# Patient Record
Sex: FEMALE | Race: WHITE | NOT HISPANIC OR LATINO
[De-identification: names, ages, dates, MRNs, and addresses within clinical notes are randomized per-mention and may not be internally consistent; named-entity substitution may affect disease eponyms.]

---

## 2017-12-30 ENCOUNTER — TRANSCRIPTION ENCOUNTER (OUTPATIENT)
Age: 32
End: 2017-12-30

## 2019-01-28 ENCOUNTER — TRANSCRIPTION ENCOUNTER (OUTPATIENT)
Age: 34
End: 2019-01-28

## 2019-01-31 ENCOUNTER — APPOINTMENT (OUTPATIENT)
Dept: GASTROENTEROLOGY | Facility: CLINIC | Age: 34
End: 2019-01-31
Payer: MEDICAID

## 2019-01-31 VITALS
HEART RATE: 82 BPM | WEIGHT: 200 LBS | BODY MASS INDEX: 37.76 KG/M2 | SYSTOLIC BLOOD PRESSURE: 110 MMHG | HEIGHT: 61 IN | DIASTOLIC BLOOD PRESSURE: 80 MMHG

## 2019-01-31 DIAGNOSIS — Z78.9 OTHER SPECIFIED HEALTH STATUS: ICD-10-CM

## 2019-01-31 DIAGNOSIS — R11.2 NAUSEA WITH VOMITING, UNSPECIFIED: ICD-10-CM

## 2019-01-31 PROCEDURE — 99204 OFFICE O/P NEW MOD 45 MIN: CPT

## 2019-01-31 RX ORDER — BUPROPION HYDROCHLORIDE 300 MG/1
300 TABLET, EXTENDED RELEASE ORAL
Qty: 30 | Refills: 0 | Status: ACTIVE | COMMUNITY
Start: 2018-07-30

## 2019-01-31 RX ORDER — AMITRIPTYLINE HYDROCHLORIDE 25 MG/1
25 TABLET, FILM COATED ORAL
Qty: 30 | Refills: 0 | Status: DISCONTINUED | COMMUNITY
Start: 2019-01-10

## 2019-01-31 RX ORDER — DULOXETINE HYDROCHLORIDE 60 MG/1
60 CAPSULE, DELAYED RELEASE PELLETS ORAL
Qty: 30 | Refills: 0 | Status: ACTIVE | COMMUNITY
Start: 2018-09-06

## 2019-01-31 RX ORDER — ONDANSETRON 4 MG/1
4 TABLET, ORALLY DISINTEGRATING ORAL
Qty: 30 | Refills: 0 | Status: ACTIVE | COMMUNITY
Start: 2019-01-31 | End: 1900-01-01

## 2019-01-31 RX ORDER — LURASIDONE HYDROCHLORIDE 60 MG/1
60 TABLET, FILM COATED ORAL
Qty: 30 | Refills: 0 | Status: ACTIVE | COMMUNITY
Start: 2018-12-13

## 2019-01-31 NOTE — ASSESSMENT
[FreeTextEntry1] : Gastroenteritis: Likely infectious given short time course.  Advised to stop Imodium until we have ruled out an infection.  Will send stool for C. diff, GI PCR.  Also will send CBC and CMP.  I will call patient with results.  Starting omeprazole and ondansetron for acute illness.

## 2019-01-31 NOTE — REVIEW OF SYSTEMS
[Heart Rate Is Fast] : fast heart rate [Chest Pain] : chest pain [As Noted in HPI] : as noted in HPI [Negative] : Heme/Lymph

## 2019-01-31 NOTE — PHYSICAL EXAM
[General Appearance - Alert] : alert [General Appearance - In No Acute Distress] : in no acute distress [Sclera] : the sclera and conjunctiva were normal [PERRL With Normal Accommodation] : pupils were equal in size, round, and reactive to light [Extraocular Movements] : extraocular movements were intact [Outer Ear] : the ears and nose were normal in appearance [Oropharynx] : the oropharynx was normal [Neck Appearance] : the appearance of the neck was normal [Neck Cervical Mass (___cm)] : no neck mass was observed [Jugular Venous Distention Increased] : there was no jugular-venous distention [Thyroid Diffuse Enlargement] : the thyroid was not enlarged [Thyroid Nodule] : there were no palpable thyroid nodules [] : no respiratory distress [Auscultation Breath Sounds / Voice Sounds] : lungs were clear to auscultation bilaterally [Heart Sounds] : normal S1 and S2 [Edema] : there was no peripheral edema [Bowel Sounds] : normal bowel sounds [Abdomen Soft] : soft [Cervical Lymph Nodes Enlarged Posterior Bilaterally] : posterior cervical [Cervical Lymph Nodes Enlarged Anterior Bilaterally] : anterior cervical [Supraclavicular Lymph Nodes Enlarged Bilaterally] : supraclavicular [Nail Clubbing] : no clubbing  or cyanosis of the fingernails [Skin Color & Pigmentation] : normal skin color and pigmentation [Skin Turgor] : normal skin turgor [No Focal Deficits] : no focal deficits [FreeTextEntry1] : diffusely tender to light palpation

## 2019-01-31 NOTE — HISTORY OF PRESENT ILLNESS
[de-identified] : This is a 33-year-old woman with a past medial history of nephrolithiasis, anxiety, depression and C. diff who presents for evaluation for a two-week history of diarrhea, nausea, and vomiting.  The patient reports about 4 episodes of diarrhea daily, described as watery.  She also reports nausea and vomiting and an inability to keep food down.  She went to [facility] urgent care and her PCP for evaluation.  She reports chest pain, hot flashes and "painful gas".  She denies chills or fevers but reports temps of 99.9.  She was advised to take Imodium, which has stopped her diarrhea.

## 2019-02-01 ENCOUNTER — OUTPATIENT (OUTPATIENT)
Dept: OUTPATIENT SERVICES | Facility: HOSPITAL | Age: 34
LOS: 1 days | End: 2019-02-01
Payer: MEDICAID

## 2019-02-01 DIAGNOSIS — Z90.49 ACQUIRED ABSENCE OF OTHER SPECIFIED PARTS OF DIGESTIVE TRACT: Chronic | ICD-10-CM

## 2019-02-01 DIAGNOSIS — Z90.89 ACQUIRED ABSENCE OF OTHER ORGANS: Chronic | ICD-10-CM

## 2019-02-01 LAB
ALBUMIN SERPL ELPH-MCNC: 4.4 G/DL
ALP BLD-CCNC: 72 U/L
ALT SERPL-CCNC: 22 U/L
ANION GAP SERPL CALC-SCNC: 14 MMOL/L
AST SERPL-CCNC: 15 U/L
BASOPHILS # BLD AUTO: 0.03 K/UL
BASOPHILS NFR BLD AUTO: 0.3 %
BILIRUB SERPL-MCNC: 0.3 MG/DL
BUN SERPL-MCNC: 9 MG/DL
CALCIUM SERPL-MCNC: 9.8 MG/DL
CHLORIDE SERPL-SCNC: 102 MMOL/L
CO2 SERPL-SCNC: 26 MMOL/L
CREAT SERPL-MCNC: 0.84 MG/DL
EOSINOPHIL # BLD AUTO: 0.22 K/UL
EOSINOPHIL NFR BLD AUTO: 2.2 %
GLUCOSE SERPL-MCNC: 207 MG/DL
HCT VFR BLD CALC: 43.2 %
HGB BLD-MCNC: 14.4 G/DL
IMM GRANULOCYTES NFR BLD AUTO: 0.2 %
LYMPHOCYTES # BLD AUTO: 3.68 K/UL
LYMPHOCYTES NFR BLD AUTO: 37 %
MAN DIFF?: NORMAL
MCHC RBC-ENTMCNC: 30.5 PG
MCHC RBC-ENTMCNC: 33.3 GM/DL
MCV RBC AUTO: 91.5 FL
MONOCYTES # BLD AUTO: 0.55 K/UL
MONOCYTES NFR BLD AUTO: 5.5 %
NEUTROPHILS # BLD AUTO: 5.45 K/UL
NEUTROPHILS NFR BLD AUTO: 54.8 %
PLATELET # BLD AUTO: 387 K/UL
POTASSIUM SERPL-SCNC: 4 MMOL/L
PROT SERPL-MCNC: 6.7 G/DL
RBC # BLD: 4.72 M/UL
RBC # FLD: 13.3 %
SODIUM SERPL-SCNC: 142 MMOL/L
WBC # FLD AUTO: 9.95 K/UL

## 2019-02-01 PROCEDURE — G9001: CPT

## 2019-02-03 RX ORDER — VANCOMYCIN HYDROCHLORIDE 125 MG/1
125 CAPSULE ORAL 4 TIMES DAILY
Qty: 56 | Refills: 0 | Status: DISCONTINUED | COMMUNITY
Start: 2019-02-02 | End: 2019-02-03

## 2019-02-04 ENCOUNTER — RX CHANGE (OUTPATIENT)
Age: 34
End: 2019-02-04

## 2019-02-04 ENCOUNTER — RX RENEWAL (OUTPATIENT)
Age: 34
End: 2019-02-04

## 2019-02-04 ENCOUNTER — MED ADMIN CHARGE (OUTPATIENT)
Age: 34
End: 2019-02-04

## 2019-02-04 RX ORDER — VANCOMYCIN HYDROCHLORIDE 125 MG/1
125 CAPSULE ORAL 4 TIMES DAILY
Qty: 56 | Refills: 0 | Status: ACTIVE | COMMUNITY
Start: 2019-02-03 | End: 1900-01-01

## 2019-02-04 RX ORDER — VANCOMYCIN HYDROCHLORIDE 25 MG/ML
25 KIT ORAL
Qty: 1 | Refills: 0 | Status: ACTIVE | COMMUNITY
Start: 2019-02-04 | End: 1900-01-01

## 2019-02-07 ENCOUNTER — EMERGENCY (EMERGENCY)
Facility: HOSPITAL | Age: 34
LOS: 1 days | Discharge: HOME | End: 2019-02-07
Attending: EMERGENCY MEDICINE
Payer: MEDICAID

## 2019-02-07 VITALS — WEIGHT: 199.96 LBS | HEIGHT: 61 IN

## 2019-02-07 DIAGNOSIS — Z90.89 ACQUIRED ABSENCE OF OTHER ORGANS: Chronic | ICD-10-CM

## 2019-02-07 DIAGNOSIS — Z90.49 ACQUIRED ABSENCE OF OTHER SPECIFIED PARTS OF DIGESTIVE TRACT: Chronic | ICD-10-CM

## 2019-02-07 LAB
ALBUMIN SERPL ELPH-MCNC: 4.4 G/DL — SIGNIFICANT CHANGE UP (ref 3.3–5.2)
ALP SERPL-CCNC: 72 U/L — SIGNIFICANT CHANGE UP (ref 40–120)
ALT FLD-CCNC: 68 U/L — HIGH
ANION GAP SERPL CALC-SCNC: 11 MMOL/L — SIGNIFICANT CHANGE UP (ref 5–17)
AST SERPL-CCNC: 32 U/L — HIGH
BASOPHILS # BLD AUTO: 0 K/UL — SIGNIFICANT CHANGE UP (ref 0–0.2)
BASOPHILS NFR BLD AUTO: 0.2 % — SIGNIFICANT CHANGE UP (ref 0–2)
BILIRUB SERPL-MCNC: 0.3 MG/DL — LOW (ref 0.4–2)
BUN SERPL-MCNC: 9 MG/DL — SIGNIFICANT CHANGE UP (ref 8–20)
CALCIUM SERPL-MCNC: 9.3 MG/DL — SIGNIFICANT CHANGE UP (ref 8.6–10.2)
CHLORIDE SERPL-SCNC: 103 MMOL/L — SIGNIFICANT CHANGE UP (ref 98–107)
CO2 SERPL-SCNC: 28 MMOL/L — SIGNIFICANT CHANGE UP (ref 22–29)
CREAT SERPL-MCNC: 0.68 MG/DL — SIGNIFICANT CHANGE UP (ref 0.5–1.3)
EOSINOPHIL # BLD AUTO: 0.2 K/UL — SIGNIFICANT CHANGE UP (ref 0–0.5)
EOSINOPHIL NFR BLD AUTO: 2.2 % — SIGNIFICANT CHANGE UP (ref 0–6)
GLUCOSE SERPL-MCNC: 112 MG/DL — SIGNIFICANT CHANGE UP (ref 70–115)
HCG SERPL-ACNC: <4 MIU/ML — SIGNIFICANT CHANGE UP
HCT VFR BLD CALC: 39.1 % — SIGNIFICANT CHANGE UP (ref 37–47)
HGB BLD-MCNC: 13.6 G/DL — SIGNIFICANT CHANGE UP (ref 12–16)
LACTATE BLDV-MCNC: 1.7 MMOL/L — SIGNIFICANT CHANGE UP (ref 0.5–2)
LIDOCAIN IGE QN: 21 U/L — LOW (ref 22–51)
LYMPHOCYTES # BLD AUTO: 2.8 K/UL — SIGNIFICANT CHANGE UP (ref 1–4.8)
LYMPHOCYTES # BLD AUTO: 29.5 % — SIGNIFICANT CHANGE UP (ref 20–55)
MCHC RBC-ENTMCNC: 30.8 PG — SIGNIFICANT CHANGE UP (ref 27–31)
MCHC RBC-ENTMCNC: 34.8 G/DL — SIGNIFICANT CHANGE UP (ref 32–36)
MCV RBC AUTO: 88.7 FL — SIGNIFICANT CHANGE UP (ref 81–99)
MONOCYTES # BLD AUTO: 0.5 K/UL — SIGNIFICANT CHANGE UP (ref 0–0.8)
MONOCYTES NFR BLD AUTO: 5 % — SIGNIFICANT CHANGE UP (ref 3–10)
NEUTROPHILS # BLD AUTO: 6 K/UL — SIGNIFICANT CHANGE UP (ref 1.8–8)
NEUTROPHILS NFR BLD AUTO: 62.8 % — SIGNIFICANT CHANGE UP (ref 37–73)
PLATELET # BLD AUTO: 317 K/UL — SIGNIFICANT CHANGE UP (ref 150–400)
POTASSIUM SERPL-MCNC: 4.8 MMOL/L — SIGNIFICANT CHANGE UP (ref 3.5–5.3)
POTASSIUM SERPL-SCNC: 4.8 MMOL/L — SIGNIFICANT CHANGE UP (ref 3.5–5.3)
PROT SERPL-MCNC: 7.2 G/DL — SIGNIFICANT CHANGE UP (ref 6.6–8.7)
RBC # BLD: 4.41 M/UL — SIGNIFICANT CHANGE UP (ref 4.4–5.2)
RBC # FLD: 12.7 % — SIGNIFICANT CHANGE UP (ref 11–15.6)
SODIUM SERPL-SCNC: 142 MMOL/L — SIGNIFICANT CHANGE UP (ref 135–145)
WBC # BLD: 9.5 K/UL — SIGNIFICANT CHANGE UP (ref 4.8–10.8)
WBC # FLD AUTO: 9.5 K/UL — SIGNIFICANT CHANGE UP (ref 4.8–10.8)

## 2019-02-07 PROCEDURE — 85027 COMPLETE CBC AUTOMATED: CPT

## 2019-02-07 PROCEDURE — 74177 CT ABD & PELVIS W/CONTRAST: CPT

## 2019-02-07 PROCEDURE — 96374 THER/PROPH/DIAG INJ IV PUSH: CPT | Mod: XU

## 2019-02-07 PROCEDURE — 99285 EMERGENCY DEPT VISIT HI MDM: CPT

## 2019-02-07 PROCEDURE — 99284 EMERGENCY DEPT VISIT MOD MDM: CPT | Mod: 25

## 2019-02-07 PROCEDURE — 96361 HYDRATE IV INFUSION ADD-ON: CPT

## 2019-02-07 PROCEDURE — 83690 ASSAY OF LIPASE: CPT

## 2019-02-07 PROCEDURE — 74177 CT ABD & PELVIS W/CONTRAST: CPT | Mod: 26

## 2019-02-07 PROCEDURE — 96376 TX/PRO/DX INJ SAME DRUG ADON: CPT

## 2019-02-07 PROCEDURE — 36415 COLL VENOUS BLD VENIPUNCTURE: CPT

## 2019-02-07 PROCEDURE — 80053 COMPREHEN METABOLIC PANEL: CPT

## 2019-02-07 PROCEDURE — 96375 TX/PRO/DX INJ NEW DRUG ADDON: CPT

## 2019-02-07 PROCEDURE — 84702 CHORIONIC GONADOTROPIN TEST: CPT

## 2019-02-07 PROCEDURE — 83605 ASSAY OF LACTIC ACID: CPT

## 2019-02-07 RX ORDER — ACETAMINOPHEN 500 MG
2 TABLET ORAL
Qty: 120 | Refills: 0
Start: 2019-02-07 | End: 2019-02-20

## 2019-02-07 RX ORDER — ACETAMINOPHEN 500 MG
975 TABLET ORAL ONCE
Qty: 0 | Refills: 0 | Status: COMPLETED | OUTPATIENT
Start: 2019-02-07 | End: 2019-02-07

## 2019-02-07 RX ORDER — MORPHINE SULFATE 50 MG/1
4 CAPSULE, EXTENDED RELEASE ORAL ONCE
Qty: 0 | Refills: 0 | Status: DISCONTINUED | OUTPATIENT
Start: 2019-02-07 | End: 2019-02-07

## 2019-02-07 RX ORDER — IBUPROFEN 200 MG
1 TABLET ORAL
Qty: 60 | Refills: 0
Start: 2019-02-07 | End: 2019-02-20

## 2019-02-07 RX ORDER — KETOROLAC TROMETHAMINE 30 MG/ML
30 SYRINGE (ML) INJECTION ONCE
Qty: 0 | Refills: 0 | Status: DISCONTINUED | OUTPATIENT
Start: 2019-02-07 | End: 2019-02-07

## 2019-02-07 RX ORDER — LACTULOSE 10 G/15ML
20 SOLUTION ORAL ONCE
Qty: 0 | Refills: 0 | Status: COMPLETED | OUTPATIENT
Start: 2019-02-07 | End: 2019-02-07

## 2019-02-07 RX ORDER — PANTOPRAZOLE SODIUM 20 MG/1
40 TABLET, DELAYED RELEASE ORAL ONCE
Qty: 0 | Refills: 0 | Status: COMPLETED | OUTPATIENT
Start: 2019-02-07 | End: 2019-02-07

## 2019-02-07 RX ORDER — SODIUM CHLORIDE 9 MG/ML
1000 INJECTION INTRAMUSCULAR; INTRAVENOUS; SUBCUTANEOUS ONCE
Qty: 0 | Refills: 0 | Status: COMPLETED | OUTPATIENT
Start: 2019-02-07 | End: 2019-02-07

## 2019-02-07 RX ORDER — OXYCODONE AND ACETAMINOPHEN 5; 325 MG/1; MG/1
1 TABLET ORAL ONCE
Qty: 0 | Refills: 0 | Status: DISCONTINUED | OUTPATIENT
Start: 2019-02-07 | End: 2019-02-07

## 2019-02-07 RX ORDER — SIMETHICONE 80 MG/1
1 TABLET, CHEWABLE ORAL
Qty: 42 | Refills: 0 | OUTPATIENT
Start: 2019-02-07 | End: 2019-02-13

## 2019-02-07 RX ORDER — ONDANSETRON 8 MG/1
4 TABLET, FILM COATED ORAL ONCE
Qty: 0 | Refills: 0 | Status: COMPLETED | OUTPATIENT
Start: 2019-02-07 | End: 2019-02-07

## 2019-02-07 RX ADMIN — MORPHINE SULFATE 4 MILLIGRAM(S): 50 CAPSULE, EXTENDED RELEASE ORAL at 17:05

## 2019-02-07 RX ADMIN — Medication 20 MILLIGRAM(S): at 22:29

## 2019-02-07 RX ADMIN — LACTULOSE 20 GRAM(S): 10 SOLUTION ORAL at 22:28

## 2019-02-07 RX ADMIN — Medication 30 MILLIGRAM(S): at 17:04

## 2019-02-07 RX ADMIN — ONDANSETRON 4 MILLIGRAM(S): 8 TABLET, FILM COATED ORAL at 14:07

## 2019-02-07 RX ADMIN — SODIUM CHLORIDE 1000 MILLILITER(S): 9 INJECTION INTRAMUSCULAR; INTRAVENOUS; SUBCUTANEOUS at 14:06

## 2019-02-07 RX ADMIN — PANTOPRAZOLE SODIUM 40 MILLIGRAM(S): 20 TABLET, DELAYED RELEASE ORAL at 12:26

## 2019-02-07 RX ADMIN — SODIUM CHLORIDE 2000 MILLILITER(S): 9 INJECTION INTRAMUSCULAR; INTRAVENOUS; SUBCUTANEOUS at 12:27

## 2019-02-07 RX ADMIN — Medication 30 MILLIGRAM(S): at 14:07

## 2019-02-07 RX ADMIN — OXYCODONE AND ACETAMINOPHEN 1 TABLET(S): 5; 325 TABLET ORAL at 14:08

## 2019-02-07 RX ADMIN — MORPHINE SULFATE 4 MILLIGRAM(S): 50 CAPSULE, EXTENDED RELEASE ORAL at 17:15

## 2019-02-07 RX ADMIN — Medication 975 MILLIGRAM(S): at 22:28

## 2019-02-07 RX ADMIN — Medication 1 MILLIGRAM(S): at 22:29

## 2019-02-07 RX ADMIN — Medication 30 MILLIGRAM(S): at 17:50

## 2019-02-07 RX ADMIN — SODIUM CHLORIDE 2000 MILLILITER(S): 9 INJECTION INTRAMUSCULAR; INTRAVENOUS; SUBCUTANEOUS at 14:10

## 2019-02-07 RX ADMIN — Medication 1 ENEMA: at 18:29

## 2019-02-07 RX ADMIN — Medication 30 MILLIGRAM(S): at 12:27

## 2019-02-07 NOTE — ED PROVIDER NOTE - OBJECTIVE STATEMENT
33F w/recent dx of C diff presents for 2d of constipation and abdominal pain. No significant surgical hx. Denies fevevers, chills or vomiting though feels nauseated. Denies balck or bloody stool. Started on vanco yesterday. Rcently seen at [facility] where she was given a lot of morphine and oxycodone for the pain which she feels may have worsened her constipation. Percocet has not been helping pain today. No other exacerbating or relieving factors.

## 2019-02-07 NOTE — ED PROVIDER NOTE - NSFOLLOWUPINSTRUCTIONS_ED_ALL_ED_FT
You were seen and evaluated in the emergency department for abdominal pain. You had a thorough evaluation including an exam, labs and imaging. You were given medications for comfort. Your workup did not demonstrate any concerning findings beyond constipation. There was no sign of severe complication of your C diff. This does not mean that your pain is not real, only that we were unable to find a dangerous or life-threatening cause. Please read the attached information sheets as they will provide useful information regarding your condition.    Continue taking your antibiotics for your infection. Try to avoid opioid pain medication as this will worsen your constipation. You may take up to 400mg of ibuprofen (Motrin, Advil) every 6 hours as needed for pain. Please take ibuprofen with food if possible to prevent stomach upset. You may also take up to 1000mg of acetaminophen (Tylenol) every 6 hours as needed for pain. It is ok to mix these medications with each other. Do not mix them with other pain medications such as naproxen (Alieve) or asprin unless specifically instructed by your doctor. Many prescription pain medications and cough medications contain acetaminophen. If you take these medications, please discuss with your provider or primary care doctor if you need to adjust the dose of acetaminophen you can take. Take other pain medications as prescribed. Take 1 cap of miralax daily while constipated. If this does not help you may take a second, though you should not need this for several consecutive days.    It is important to understand that while your workup was reassuring, no workup can completely exclude all concerning conditions. Therfore, please return if you develop worsening or concerning new symptoms including worsening pain, pain that spreads to new places, inability to tolerate an oral diet, new and worsening fevers and chills, weakness, inability to pass stool or flatulent gas, those included on the attached information sheets, or other findings concerning to you. Please take all your medications as prescribed.    Please be sure to follow up with your regular doctor in the next 2 days.

## 2019-02-07 NOTE — ED STATDOCS - PROGRESS NOTE DETAILS
Patient w/ PMH C diff (10 years ago) presents complaining of constipation. She initially started having diarrhea last week and was diagnosed with C diff 6 days ago, but did not start treatment until 3 days ago. She also had chest pains which caused her to present to [facility] ED. She was admitted to [facility] where she was admitted with chest pain where she received a lot of morphine. She was discharged the next morning on the same day that the diarrhea stopped. The last time she moved her bowels was 4 days ago. She complains of severe bloating, cramping and abdominal distension. Dr. Medina (GI) recommended she come to the ED for evaluation. Other than PO Vanco she is taking Pervacid and Pepcid.  PMD: Allexander  Exam: Appears uncomfortable, tachycardic, lungs CTA B/L, abd distended, decreased bowel sounds, diffusely TTP, mostly in epigastrium, right CVAT.  Initial orders placed. Patient will be sent to the main for further evaluation by another physician.

## 2019-02-07 NOTE — ED ADULT NURSE NOTE - OBJECTIVE STATEMENT
Pt is here with c/o constipation and abd pain.  States she was in the hospital on Monday and dx with C diff, states she had morphine for pain and has been constipated.  Medicated for pain as ordered

## 2019-02-07 NOTE — ED PROVIDER NOTE - MEDICAL DECISION MAKING DETAILS
Abdominal pain in setting of C diff. Pt tachycaridc appears dehydrated, nontoxic. Constipation, r/o megacolon. CT already ordered by [facility] lora prior to my assessment. Will give pain control, enema, fluids, reassess.

## 2019-02-07 NOTE — ED PROVIDER NOTE - CARE PLAN
Principal Discharge DX:	Abdominal pain  Secondary Diagnosis:	Constipation  Secondary Diagnosis:	C. difficile colitis

## 2019-02-07 NOTE — ED ADULT NURSE REASSESSMENT NOTE - NS ED NURSE REASSESS COMMENT FT1
Pt medicated as ordered per Dr Masters.  P/s she feels much better since passing gas.  Awaiting disposition

## 2019-02-07 NOTE — ED PROVIDER NOTE - PMH
Anxiety    Borderline personality disorder    Depression    IBS (irritable bowel syndrome)    Recurrent UTI    Renal colic

## 2019-02-07 NOTE — ED ADULT TRIAGE NOTE - CHIEF COMPLAINT QUOTE
Pt ambulatory in ED c/o " I have cdiff." Pt reports her diarrhea stopped 2 days ago and now has constipation. Pt currently on PO vanco. Also reports abdominal discomfort secondary to gas.

## 2019-02-07 NOTE — ED PROVIDER NOTE - PHYSICAL EXAMINATION
GEN: mild distress from pain, NAD, A & O x 4  HEAD/EYES: NCAT, PERRL, EOMI, anicteric sclerae, no conjunctival pallor  ENT: mucus membranes moist, oropharynx WNL, trachea midline, no JVD  RESP: lungs CTA with equal breath sounds bilaterally, chest wall nontender and atraumatic  CV: heart with normal rate (no longer tachycardic), reg rhythm S1, S2, no murmur; distal pulses intact and symmetric bilaterally  GI: mild diffuse abdomianl tenderness, no rebound, no guarding, nonsurgical  : no CVAT  MSK: extremities atraumatic and nontender, no edema, no asymmetry. the back is without midline or lateral tenderness, there is no spinal deformity or stepoff and the back is ranged painlessly. the neck has no midline tenderness, deformity, or stepoff, and is ranged painlessly.  SKIN: warm, dry, no rash, no bruising, no cyanosis. color appropriate for ethnicity  NEURO: alert, mentating appropriately, no facial asymmetry. gross sensation, motor, coordination are intact  PSYCH: Affect appropriate

## 2019-02-07 NOTE — ED PROVIDER NOTE - NS ED ROS FT
CONST: no fevers, no chills, no trauma  EYES: no pain, no visual disturbances  ENT: no sore throat, no epistaxis, no rhinorrhea, no hearing changes  CV: no chest pain, no palpitations, no orthopnea, no extremity pain or swelling  RESP: no shortness of breath, no cough, no sputum, no pleurisy, no wheezing  ABD: see HPI  : no dysuria, no hematuria, no frequency, no urgency  MSK: no back pain, no neck pain, no extremity pain  NEURO: no headache, no sensory disturbances, no focal weakness, no dizziness  HEME: no easy bleeding or bruising  SKIN: no diaphoresis, no rash

## 2019-02-07 NOTE — ED PROVIDER NOTE - PROGRESS NOTE DETAILS
Pt repeatedly refusing oral contrast Pt refusing CT until after enema. Pt appears more comfortable. CT neagtive except for constipation, having bowel movement now. Will DC home with laxatives, pain control, f/u with PCP. Initially improved now with worse pain, advised family opioids making it worse. Will try to avioid opioids and giv pain control through other means. Pain improved after medication and large flatulance. Pt stable for discharge

## 2019-02-08 VITALS
OXYGEN SATURATION: 100 % | DIASTOLIC BLOOD PRESSURE: 67 MMHG | HEART RATE: 90 BPM | TEMPERATURE: 98 F | RESPIRATION RATE: 20 BRPM | SYSTOLIC BLOOD PRESSURE: 106 MMHG

## 2019-02-08 DIAGNOSIS — Z71.89 OTHER SPECIFIED COUNSELING: ICD-10-CM

## 2019-02-14 ENCOUNTER — APPOINTMENT (OUTPATIENT)
Dept: GASTROENTEROLOGY | Facility: CLINIC | Age: 34
End: 2019-02-14
Payer: MEDICAID

## 2019-02-14 VITALS
HEIGHT: 61 IN | HEART RATE: 84 BPM | DIASTOLIC BLOOD PRESSURE: 74 MMHG | BODY MASS INDEX: 37.76 KG/M2 | OXYGEN SATURATION: 98 % | RESPIRATION RATE: 16 BRPM | SYSTOLIC BLOOD PRESSURE: 108 MMHG | WEIGHT: 200 LBS

## 2019-02-14 DIAGNOSIS — A04.72 ENTEROCOLITIS DUE TO CLOSTRIDIUM DIFFICILE, NOT SPECIFIED AS RECURRENT: ICD-10-CM

## 2019-02-14 DIAGNOSIS — R19.7 DIARRHEA, UNSPECIFIED: ICD-10-CM

## 2019-02-14 PROCEDURE — 99214 OFFICE O/P EST MOD 30 MIN: CPT

## 2019-02-14 RX ORDER — FIDAXOMICIN 200 MG/1
200 TABLET, FILM COATED ORAL TWICE DAILY
Qty: 20 | Refills: 0 | Status: ACTIVE | COMMUNITY
Start: 2019-02-14 | End: 1900-01-01

## 2019-02-14 NOTE — PHYSICAL EXAM
[General Appearance - Alert] : alert [General Appearance - In No Acute Distress] : in no acute distress [Sclera] : the sclera and conjunctiva were normal [PERRL With Normal Accommodation] : pupils were equal in size, round, and reactive to light [Extraocular Movements] : extraocular movements were intact [Outer Ear] : the ears and nose were normal in appearance [Oropharynx] : the oropharynx was normal [Neck Appearance] : the appearance of the neck was normal [Neck Cervical Mass (___cm)] : no neck mass was observed [Jugular Venous Distention Increased] : there was no jugular-venous distention [Thyroid Diffuse Enlargement] : the thyroid was not enlarged [Thyroid Nodule] : there were no palpable thyroid nodules [] : no respiratory distress [Auscultation Breath Sounds / Voice Sounds] : lungs were clear to auscultation bilaterally [Heart Sounds] : normal S1 and S2 [Edema] : there was no peripheral edema [Bowel Sounds] : normal bowel sounds [Abdomen Soft] : soft [Cervical Lymph Nodes Enlarged Posterior Bilaterally] : posterior cervical [Cervical Lymph Nodes Enlarged Anterior Bilaterally] : anterior cervical [Supraclavicular Lymph Nodes Enlarged Bilaterally] : supraclavicular [Nail Clubbing] : no clubbing  or cyanosis of the fingernails [Skin Color & Pigmentation] : normal skin color and pigmentation [Skin Turgor] : normal skin turgor [No Focal Deficits] : no focal deficits [Oriented To Time, Place, And Person] : oriented to person, place, and time [FreeTextEntry1] : anxious

## 2019-02-14 NOTE — HISTORY OF PRESENT ILLNESS
[de-identified] : This is a 33-year-old woman with a past medial history of nephrolithiasis, anxiety, depression and C. diff who recently seen by me in approximately 2 weeks ago for acute diarrhea that was later identified being secondary to C. difficile, for which she completed a course of vancomycin by mouth in liquid form.  After taking the medication for about 7-10 days, her bowel habits normalized, and she was feeling much better.  She reports today that overnight, she was awoken by significant abdominal pain followed by diarrhea, and has had subsequent episodes today prompting her to make an urgent followup appointment with me today.  Of note, since she was last evaluated by me, I received a phone call from her, and she reported to me that her abdominal pain was worse and her abdomen was distended.  Because I was unable to see her in the office at that time, I advised that she go to the emergency department for further evaluation.  She underwent a CT abdomen and pelvis that was not concerning, and she was discharged safely from the Emergency Department.  She also reports that three of her coworkers have also become sick with a diarrheal illness, and she is concerned that she may have spread and the C. difficile to her coworkers.  She reports that she was terminated due to missed work from this illness and four did not disclosing that she may have a communicable disease.  She denies any nausea, vomiting, abdominal distention, or rectal bleeding.  She denies any fevers or chills.

## 2019-02-14 NOTE — ASSESSMENT
[FreeTextEntry1] : Likely recurrent C. diff.  Since she seems to have failed treatment with PO vancomycin, will order fidaxomicin.  Advised good hygiene, avoidance of contact with the immunocompromised and thorough handwashing.  All questions/concerns addressed.

## 2019-02-27 ENCOUNTER — MEDICATION RENEWAL (OUTPATIENT)
Age: 34
End: 2019-02-27

## 2019-02-27 RX ORDER — OMEPRAZOLE 40 MG/1
40 CAPSULE, DELAYED RELEASE ORAL
Qty: 1 | Refills: 4 | Status: ACTIVE | COMMUNITY
Start: 2019-01-31 | End: 1900-01-01

## 2019-03-11 ENCOUNTER — OTHER (OUTPATIENT)
Age: 34
End: 2019-03-11

## 2019-06-10 ENCOUNTER — APPOINTMENT (OUTPATIENT)
Dept: GASTROENTEROLOGY | Facility: CLINIC | Age: 34
End: 2019-06-10

## 2020-06-01 ENCOUNTER — OUTPATIENT (OUTPATIENT)
Dept: OUTPATIENT SERVICES | Facility: HOSPITAL | Age: 35
LOS: 1 days | End: 2020-06-01
Payer: MEDICAID

## 2020-06-01 DIAGNOSIS — Z90.89 ACQUIRED ABSENCE OF OTHER ORGANS: Chronic | ICD-10-CM

## 2020-06-01 DIAGNOSIS — Z90.49 ACQUIRED ABSENCE OF OTHER SPECIFIED PARTS OF DIGESTIVE TRACT: Chronic | ICD-10-CM

## 2020-06-15 PROCEDURE — G9001: CPT

## 2020-06-26 DIAGNOSIS — Z71.89 OTHER SPECIFIED COUNSELING: ICD-10-CM

## 2020-12-18 ENCOUNTER — TRANSCRIPTION ENCOUNTER (OUTPATIENT)
Age: 35
End: 2020-12-18

## 2021-02-08 NOTE — ED PROVIDER NOTE - NS ED NOTE AC HIGH RISK COUNTRIES
Name: Rox Hall  : 59 year old  Date:           Chief Complaint: Hypothyroid parathyroidism    History of Present Illness:  Patient comes new to this provider previously known to , and Dr. Fernandez.  Hypothyroid History   Night sweats? Denies   fatigue? Denies   Dry skin? denies   slow or rapid heart beat? denies   [place] TSH value? six months ago  Current treatment? Levothyroxine 137 mcg p.o. daily  Trouble waking? denies     Patient has a history of anxiety takes Xanax but a few times years the patient does not want to start SSRIs.    Patient is a current smoker has no desire to quit at this time;.     influenza vaccine: refused   ALLERGIES:  No Known Allergies  Active Ambulatory Problems     Diagnosis Date Noted   • Anxiety 2014   • Hypothyroidism (acquired) 2014   • Non-compliance 2014   • Eczema 02/15/2019   • Smoker 02/15/2019   • Healthcare maintenance 02/15/2019     Resolved Ambulatory Problems     Diagnosis Date Noted   • No Resolved Ambulatory Problems     Past Medical History:   Diagnosis Date   • Chest pain    • Hypothyroidism      Past Surgical History:   Procedure Laterality Date   • Breast lumpectomy       Social History     Socioeconomic History   • Marital status: /Civil Union     Spouse name: Not on file   • Number of children: Not on file   • Years of education: Not on file   • Highest education level: Not on file   Occupational History   • Not on file   Social Needs   • Financial resource strain: Not on file   • Food insecurity     Worry: Not on file     Inability: Not on file   • Transportation needs     Medical: Not on file     Non-medical: Not on file   Tobacco Use   • Smoking status: Current Every Day Smoker     Packs/day: 1.00     Years: 34.00     Pack years: 34.00   • Smokeless tobacco: Never Used   Substance and Sexual Activity   • Alcohol use: Yes     Frequency: Never   • Drug use: Yes     Types: Marijuana     Comment: occasionally   • Sexual activity:  Not on file   Lifestyle   • Physical activity     Days per week: Not on file     Minutes per session: Not on file   • Stress: Not on file   Relationships   • Social connections     Talks on phone: Not on file     Gets together: Not on file     Attends Yazidism service: Not on file     Active member of club or organization: Not on file     Attends meetings of clubs or organizations: Not on file     Relationship status: Not on file   • Intimate partner violence     Fear of current or ex partner: Not on file     Emotionally abused: Not on file     Physically abused: Not on file     Forced sexual activity: Not on file   Other Topics Concern   • Not on file   Social History Narrative   • Not on file         Review of Systems   Constitutional: Negative for activity change, appetite change, chills, fever and unexpected weight change.   HENT: Negative for congestion, drooling, ear discharge, ear pain, facial swelling, mouth sores, nosebleeds and voice change.    Eyes: Negative for discharge and visual disturbance.   Respiratory: Negative for apnea, cough, choking, shortness of breath, wheezing and stridor.    Cardiovascular: Negative for chest pain, palpitations and leg swelling.   Gastrointestinal: Negative for abdominal pain, constipation, diarrhea, nausea and rectal pain.   Endocrine: Negative for cold intolerance, heat intolerance, polydipsia and polyuria.   Genitourinary: Negative for dysuria, flank pain, genital sores, hematuria and urgency.   Musculoskeletal: Negative for arthralgias, back pain, gait problem and joint swelling.   Skin: Negative for color change, pallor and rash.   Allergic/Immunologic: Negative for food allergies.   Neurological: Negative for dizziness, seizures, syncope, facial asymmetry, speech difficulty, weakness, light-headedness and numbness.   Hematological: Negative for adenopathy.   Psychiatric/Behavioral: Negative for agitation, behavioral problems, confusion, hallucinations and suicidal  ideas. The patient is not nervous/anxious and is not hyperactive.      Physical Exam   Constitutional: She is oriented to person, place, and time. She appears well-developed and well-nourished. No distress.   HENT:   Head: Normocephalic and atraumatic.   Right Ear: External ear normal.   Left Ear: External ear normal.   Nose: Nose normal.   Mouth/Throat: Oropharynx is clear and moist. No oropharyngeal exudate.   Eyes: Pupils are equal, round, and reactive to light. Conjunctivae and EOM are normal. Right eye exhibits no discharge. Left eye exhibits no discharge. No scleral icterus.   Neck: Normal range of motion. Neck supple. No JVD present. No tracheal deviation present. No thyromegaly present.   Cardiovascular: Normal rate, regular rhythm, normal heart sounds and intact distal pulses. Exam reveals no gallop and no friction rub.   No murmur heard.  Pulmonary/Chest: Effort normal and breath sounds normal. She has no wheezes. She has no rales. She exhibits no tenderness.   Abdominal: Soft. Bowel sounds are normal. She exhibits no distension and no mass. There is no abdominal tenderness. There is no rebound and no guarding.   Genitourinary:    Vagina normal.     Musculoskeletal: Normal range of motion.         General: No tenderness, deformity or edema.   Lymphadenopathy:     She has no cervical adenopathy.   Neurological: She is alert and oriented to person, place, and time. She has normal reflexes. No cranial nerve deficit. Coordination normal.   Skin: Skin is warm and dry. No rash noted. She is not diaphoretic. No erythema. No pallor.   Psychiatric: She has a normal mood and affect. Her behavior is normal. Judgment and thought content normal.     Visit Vitals  /60 (BP Location: LUE - Left upper extremity, Patient Position: Sitting, Cuff Size: Regular)   Temp 96.8 °F (36 °C) (Skin)   Ht 5' 4\" (1.626 m)   Wt 55.8 kg (123 lb)   BMI 21.11 kg/m²     Current Medications    ALPRAZOLAM (XANAX) 0.25 MG TABLET    TAKE 1  TABLET EVERY 6 HOURS AS NEEDED FOR ANXIETY    LEVOTHYROXINE (SYNTHROID, LEVOTHROID) 137 MCG TABLET    Take 1 tablet Monday through Friday     Hypothyroidism (acquired)      Anxiety      PLAN:  -The patients hypothyroidism is well controlled today as evidence by TSH of  4.29  -The patient currently denies any signs of symptoms of increasing thyroid dysfunction including bradycardia, dry skin, lethargy, difficulty concentrating, or weight gain.   -Will continue with current treatment regimen   -Patient was taught the proper Synthroid administration or maximum absorption and effectiveness ( 1hour before all other medications and food in the morning)   -Will continue to follow up with TSH results or in 3 months        The patient was taught CBT therapies such as deep breathing, relaxation and guided imagery   The patient was told to keep a journal of anxiety triggers, noting intensity, duration, location and recovery time  The patient denies any suicidal thoughts or ideation  The patient finds their anxiety is triggered by    events   KANIKA 7 score: less than 5   Will order xanax 0.25mg PO Q6hr prn   The patient will call the clinic or EMS for any suicidal ideation or thoughts   The patient will follow up as needed  Long term care plan for mental health was discussed     Patient has no desire to quit smoking at this time encouragement given.    Patient denied influenza vaccine and Covid vaccine at this time      Refills may be given through the follow-up timeframe and for one  courtesy refill if follow-up timeframe is not met  Refills may not be authorized for management of acute illnesses regardless of follow-up timeframe  The patient verbalized understanding of the plan of care and the risks involved with treatment and agreed.   If any chest pain, shortness of breath, dizziness or syncope occur the patient was asked to call EMS or report the local ED         Sebastian Covington CNP       No

## 2021-03-08 ENCOUNTER — EMERGENCY (EMERGENCY)
Facility: HOSPITAL | Age: 36
LOS: 1 days | End: 2021-03-08
Admitting: EMERGENCY MEDICINE
Payer: MEDICAID

## 2021-03-08 DIAGNOSIS — Z90.49 ACQUIRED ABSENCE OF OTHER SPECIFIED PARTS OF DIGESTIVE TRACT: Chronic | ICD-10-CM

## 2021-03-08 DIAGNOSIS — Z90.89 ACQUIRED ABSENCE OF OTHER ORGANS: Chronic | ICD-10-CM

## 2021-03-08 PROCEDURE — 99284 EMERGENCY DEPT VISIT MOD MDM: CPT

## 2021-03-08 PROCEDURE — 76856 US EXAM PELVIC COMPLETE: CPT | Mod: 26,59

## 2021-03-08 PROCEDURE — 74177 CT ABD & PELVIS W/CONTRAST: CPT | Mod: 26

## 2021-03-08 PROCEDURE — 93010 ELECTROCARDIOGRAM REPORT: CPT

## 2021-03-08 PROCEDURE — 93975 VASCULAR STUDY: CPT | Mod: 26

## 2021-03-15 ENCOUNTER — INPATIENT (INPATIENT)
Facility: HOSPITAL | Age: 36
LOS: 1 days | Discharge: HOME | End: 2021-03-17
Payer: MEDICAID

## 2021-03-15 DIAGNOSIS — Z90.49 ACQUIRED ABSENCE OF OTHER SPECIFIED PARTS OF DIGESTIVE TRACT: Chronic | ICD-10-CM

## 2021-03-15 DIAGNOSIS — Z90.89 ACQUIRED ABSENCE OF OTHER ORGANS: Chronic | ICD-10-CM

## 2021-03-15 PROCEDURE — 76856 US EXAM PELVIC COMPLETE: CPT | Mod: 26,59

## 2021-03-15 PROCEDURE — 93010 ELECTROCARDIOGRAM REPORT: CPT

## 2021-03-15 PROCEDURE — 93975 VASCULAR STUDY: CPT | Mod: 26

## 2021-03-15 PROCEDURE — 99285 EMERGENCY DEPT VISIT HI MDM: CPT

## 2021-03-15 PROCEDURE — 74177 CT ABD & PELVIS W/CONTRAST: CPT | Mod: 26

## 2021-03-16 PROCEDURE — 71045 X-RAY EXAM CHEST 1 VIEW: CPT | Mod: 26

## 2022-05-18 ENCOUNTER — NON-APPOINTMENT (OUTPATIENT)
Age: 37
End: 2022-05-18

## 2022-09-19 ENCOUNTER — NON-APPOINTMENT (OUTPATIENT)
Age: 37
End: 2022-09-19

## 2023-05-13 ENCOUNTER — OFFICE (OUTPATIENT)
Dept: URBAN - METROPOLITAN AREA CLINIC 12 | Facility: CLINIC | Age: 38
Setting detail: OPHTHALMOLOGY
End: 2023-05-13
Payer: MEDICAID

## 2023-05-13 DIAGNOSIS — E11.9: ICD-10-CM

## 2023-05-13 DIAGNOSIS — H16.223: ICD-10-CM

## 2023-05-13 PROBLEM — H52.7 REFRACTIVE ERROR: Status: ACTIVE | Noted: 2023-05-13

## 2023-05-13 PROCEDURE — 92012 INTRM OPH EXAM EST PATIENT: CPT | Performed by: OPTOMETRIST

## 2023-05-13 ASSESSMENT — AXIALLENGTH_DERIVED
OS_AL: 23.9508
OS_AL: 23.9508
OD_AL: 23.848
OD_AL: 23.848

## 2023-05-13 ASSESSMENT — REFRACTION_CURRENTRX
OD_CYLINDER: -0.50
OS_VPRISM_DIRECTION: SV
OS_OVR_VA: 20/
OD_VPRISM_DIRECTION: SV
OS_SPHERE: -3.00
OS_AXIS: 139
OD_SPHERE: -3.00
OD_OVR_VA: 20/
OS_CYLINDER: -1.00
OD_AXIS: 036

## 2023-05-13 ASSESSMENT — SUPERFICIAL PUNCTATE KERATITIS (SPK)
OD_SPK: 1+
OS_SPK: 1+

## 2023-05-13 ASSESSMENT — REFRACTION_MANIFEST
OS_CYLINDER: -1.00
OD_AXIS: 020
OS_VA1: 20/20
OS_AXIS: 160
OS_SPHERE: -3.00
OD_CYLINDER: -0.75
OD_VA1: 20/20
OD_SPHERE: -2.75

## 2023-05-13 ASSESSMENT — KERATOMETRY
OD_K2POWER_DIOPTERS: 46.50
OS_AXISANGLE_DEGREES: 070
OS_K2POWER_DIOPTERS: 46.75
OD_K1POWER_DIOPTERS: 45.75
OS_K1POWER_DIOPTERS: 45.75
OD_AXISANGLE_DEGREES: 113

## 2023-05-13 ASSESSMENT — REFRACTION_AUTOREFRACTION
OS_AXIS: 162
OD_AXIS: 020
OS_CYLINDER: -1.00
OD_SPHERE: -2.75
OD_CYLINDER: -0.75
OS_SPHERE: -3.00

## 2023-05-13 ASSESSMENT — SPHEQUIV_DERIVED
OS_SPHEQUIV: -3.5
OS_SPHEQUIV: -3.5
OD_SPHEQUIV: -3.125
OD_SPHEQUIV: -3.125

## 2023-05-13 ASSESSMENT — TONOMETRY
OD_IOP_MMHG: 21
OS_IOP_MMHG: 21

## 2023-05-13 ASSESSMENT — CONFRONTATIONAL VISUAL FIELD TEST (CVF)
OS_FINDINGS: FULL
OD_FINDINGS: FULL

## 2023-05-13 ASSESSMENT — VISUAL ACUITY
OD_BCVA: 20/25-2
OS_BCVA: 20/20-1

## 2023-07-13 ENCOUNTER — NON-APPOINTMENT (OUTPATIENT)
Age: 38
End: 2023-07-13

## 2023-07-14 ENCOUNTER — EMERGENCY (EMERGENCY)
Facility: HOSPITAL | Age: 38
LOS: 1 days | Discharge: HOME | End: 2023-07-14
Attending: EMERGENCY MEDICINE | Admitting: EMERGENCY MEDICINE
Payer: MEDICAID

## 2023-07-14 VITALS
OXYGEN SATURATION: 99 % | SYSTOLIC BLOOD PRESSURE: 121 MMHG | WEIGHT: 169.09 LBS | HEIGHT: 65 IN | DIASTOLIC BLOOD PRESSURE: 78 MMHG | RESPIRATION RATE: 18 BRPM | HEART RATE: 94 BPM | TEMPERATURE: 98 F

## 2023-07-14 VITALS
HEART RATE: 97 BPM | DIASTOLIC BLOOD PRESSURE: 71 MMHG | SYSTOLIC BLOOD PRESSURE: 103 MMHG | RESPIRATION RATE: 18 BRPM | OXYGEN SATURATION: 97 % | TEMPERATURE: 99 F

## 2023-07-14 DIAGNOSIS — Z90.49 ACQUIRED ABSENCE OF OTHER SPECIFIED PARTS OF DIGESTIVE TRACT: Chronic | ICD-10-CM

## 2023-07-14 DIAGNOSIS — Z90.89 ACQUIRED ABSENCE OF OTHER ORGANS: Chronic | ICD-10-CM

## 2023-07-14 LAB
ALBUMIN SERPL ELPH-MCNC: 3.7 G/DL — SIGNIFICANT CHANGE UP (ref 3.3–5)
ALP SERPL-CCNC: 64 U/L — SIGNIFICANT CHANGE UP (ref 40–120)
ALT FLD-CCNC: 28 U/L — SIGNIFICANT CHANGE UP (ref 12–78)
ANION GAP SERPL CALC-SCNC: 11 MMOL/L — SIGNIFICANT CHANGE UP (ref 5–17)
APPEARANCE UR: CLEAR — SIGNIFICANT CHANGE UP
AST SERPL-CCNC: 13 U/L — LOW (ref 15–37)
BASOPHILS # BLD AUTO: 0.04 K/UL — SIGNIFICANT CHANGE UP (ref 0–0.2)
BASOPHILS NFR BLD AUTO: 0.4 % — SIGNIFICANT CHANGE UP (ref 0–2)
BILIRUB SERPL-MCNC: 0.8 MG/DL — SIGNIFICANT CHANGE UP (ref 0.2–1.2)
BILIRUB UR-MCNC: NEGATIVE — SIGNIFICANT CHANGE UP
BUN SERPL-MCNC: 19 MG/DL — SIGNIFICANT CHANGE UP (ref 7–23)
CALCIUM SERPL-MCNC: 8.4 MG/DL — LOW (ref 8.5–10.1)
CHLORIDE SERPL-SCNC: 106 MMOL/L — SIGNIFICANT CHANGE UP (ref 96–108)
CO2 SERPL-SCNC: 21 MMOL/L — LOW (ref 22–31)
COLOR SPEC: YELLOW — SIGNIFICANT CHANGE UP
CREAT SERPL-MCNC: 1.2 MG/DL — SIGNIFICANT CHANGE UP (ref 0.5–1.3)
DIFF PNL FLD: NEGATIVE — SIGNIFICANT CHANGE UP
EGFR: 59 ML/MIN/1.73M2 — LOW
EOSINOPHIL # BLD AUTO: 0.02 K/UL — SIGNIFICANT CHANGE UP (ref 0–0.5)
EOSINOPHIL NFR BLD AUTO: 0.2 % — SIGNIFICANT CHANGE UP (ref 0–6)
GLUCOSE SERPL-MCNC: 152 MG/DL — HIGH (ref 70–99)
GLUCOSE UR QL: NEGATIVE MG/DL — SIGNIFICANT CHANGE UP
HCG SERPL-ACNC: <1 MIU/ML — SIGNIFICANT CHANGE UP
HCT VFR BLD CALC: 37.5 % — SIGNIFICANT CHANGE UP (ref 34.5–45)
HGB BLD-MCNC: 13.6 G/DL — SIGNIFICANT CHANGE UP (ref 11.5–15.5)
IMM GRANULOCYTES NFR BLD AUTO: 0.6 % — SIGNIFICANT CHANGE UP (ref 0–0.9)
KETONES UR-MCNC: 15 MG/DL
LEUKOCYTE ESTERASE UR-ACNC: NEGATIVE — SIGNIFICANT CHANGE UP
LIDOCAIN IGE QN: 66 U/L — LOW (ref 73–393)
LYMPHOCYTES # BLD AUTO: 2.99 K/UL — SIGNIFICANT CHANGE UP (ref 1–3.3)
LYMPHOCYTES # BLD AUTO: 29.5 % — SIGNIFICANT CHANGE UP (ref 13–44)
MCHC RBC-ENTMCNC: 31.7 PG — SIGNIFICANT CHANGE UP (ref 27–34)
MCHC RBC-ENTMCNC: 36.3 GM/DL — HIGH (ref 32–36)
MCV RBC AUTO: 87.4 FL — SIGNIFICANT CHANGE UP (ref 80–100)
MONOCYTES # BLD AUTO: 0.5 K/UL — SIGNIFICANT CHANGE UP (ref 0–0.9)
MONOCYTES NFR BLD AUTO: 4.9 % — SIGNIFICANT CHANGE UP (ref 2–14)
NEUTROPHILS # BLD AUTO: 6.52 K/UL — SIGNIFICANT CHANGE UP (ref 1.8–7.4)
NEUTROPHILS NFR BLD AUTO: 64.4 % — SIGNIFICANT CHANGE UP (ref 43–77)
NITRITE UR-MCNC: NEGATIVE — SIGNIFICANT CHANGE UP
NRBC # BLD: 0 /100 WBCS — SIGNIFICANT CHANGE UP (ref 0–0)
PH UR: 5 — SIGNIFICANT CHANGE UP (ref 5–8)
PLATELET # BLD AUTO: 323 K/UL — SIGNIFICANT CHANGE UP (ref 150–400)
POTASSIUM SERPL-MCNC: 3.2 MMOL/L — LOW (ref 3.5–5.3)
POTASSIUM SERPL-SCNC: 3.2 MMOL/L — LOW (ref 3.5–5.3)
PROT SERPL-MCNC: 6.5 G/DL — SIGNIFICANT CHANGE UP (ref 6–8.3)
PROT UR-MCNC: NEGATIVE MG/DL — SIGNIFICANT CHANGE UP
RBC # BLD: 4.29 M/UL — SIGNIFICANT CHANGE UP (ref 3.8–5.2)
RBC # FLD: 11.9 % — SIGNIFICANT CHANGE UP (ref 10.3–14.5)
SODIUM SERPL-SCNC: 138 MMOL/L — SIGNIFICANT CHANGE UP (ref 135–145)
SP GR SPEC: 1.03 — HIGH (ref 1–1.03)
UROBILINOGEN FLD QL: 0.2 MG/DL — SIGNIFICANT CHANGE UP (ref 0.2–1)
WBC # BLD: 10.13 K/UL — SIGNIFICANT CHANGE UP (ref 3.8–10.5)
WBC # FLD AUTO: 10.13 K/UL — SIGNIFICANT CHANGE UP (ref 3.8–10.5)

## 2023-07-14 PROCEDURE — 80053 COMPREHEN METABOLIC PANEL: CPT

## 2023-07-14 PROCEDURE — 84702 CHORIONIC GONADOTROPIN TEST: CPT

## 2023-07-14 PROCEDURE — 74177 CT ABD & PELVIS W/CONTRAST: CPT | Mod: 26,MA

## 2023-07-14 PROCEDURE — 99284 EMERGENCY DEPT VISIT MOD MDM: CPT | Mod: 25

## 2023-07-14 PROCEDURE — 83690 ASSAY OF LIPASE: CPT

## 2023-07-14 PROCEDURE — 85025 COMPLETE CBC W/AUTO DIFF WBC: CPT

## 2023-07-14 PROCEDURE — 99285 EMERGENCY DEPT VISIT HI MDM: CPT

## 2023-07-14 PROCEDURE — 96374 THER/PROPH/DIAG INJ IV PUSH: CPT | Mod: XU

## 2023-07-14 PROCEDURE — 96361 HYDRATE IV INFUSION ADD-ON: CPT

## 2023-07-14 PROCEDURE — 87086 URINE CULTURE/COLONY COUNT: CPT

## 2023-07-14 PROCEDURE — 81003 URINALYSIS AUTO W/O SCOPE: CPT

## 2023-07-14 PROCEDURE — 74177 CT ABD & PELVIS W/CONTRAST: CPT | Mod: MA

## 2023-07-14 PROCEDURE — 36415 COLL VENOUS BLD VENIPUNCTURE: CPT

## 2023-07-14 RX ORDER — ONDANSETRON 8 MG/1
4 TABLET, FILM COATED ORAL ONCE
Refills: 0 | Status: COMPLETED | OUTPATIENT
Start: 2023-07-14 | End: 2023-07-14

## 2023-07-14 RX ORDER — POTASSIUM CHLORIDE 20 MEQ
40 PACKET (EA) ORAL ONCE
Refills: 0 | Status: COMPLETED | OUTPATIENT
Start: 2023-07-14 | End: 2023-07-14

## 2023-07-14 RX ORDER — METOCLOPRAMIDE HCL 10 MG
1 TABLET ORAL
Qty: 9 | Refills: 0
Start: 2023-07-14 | End: 2023-07-16

## 2023-07-14 RX ORDER — VANCOMYCIN HCL 1 G
1 VIAL (EA) INTRAVENOUS
Qty: 40 | Refills: 0
Start: 2023-07-14 | End: 2023-07-23

## 2023-07-14 RX ORDER — SODIUM CHLORIDE 9 MG/ML
1000 INJECTION INTRAMUSCULAR; INTRAVENOUS; SUBCUTANEOUS ONCE
Refills: 0 | Status: COMPLETED | OUTPATIENT
Start: 2023-07-14 | End: 2023-07-14

## 2023-07-14 RX ADMIN — Medication 40 MILLIEQUIVALENT(S): at 15:19

## 2023-07-14 RX ADMIN — SODIUM CHLORIDE 1000 MILLILITER(S): 9 INJECTION INTRAMUSCULAR; INTRAVENOUS; SUBCUTANEOUS at 15:19

## 2023-07-14 RX ADMIN — ONDANSETRON 4 MILLIGRAM(S): 8 TABLET, FILM COATED ORAL at 14:10

## 2023-07-14 RX ADMIN — SODIUM CHLORIDE 1000 MILLILITER(S): 9 INJECTION INTRAMUSCULAR; INTRAVENOUS; SUBCUTANEOUS at 14:10

## 2023-07-14 NOTE — ED PROVIDER NOTE - PATIENT PORTAL LINK FT
You can access the FollowMyHealth Patient Portal offered by [facility] by registering at the following website: [email or web address]. By joining "[employer]’s FollowMyHealth portal, you will also be able to view your health information using other applications (apps) compatible with our system.

## 2023-07-14 NOTE — ED ADULT TRIAGE NOTE - CHIEF COMPLAINT QUOTE
38yr yr old female a&ox4 ambulates into ED from home c/o n/d pt arrives to ED via ems, pt notes generalized weakness, Zofran and ivf being adm, pt denies fever chills, chest pain or sob at this time. Dorothy BURGER 38yr yr old female a&ox4 into ED from urgent care c/o n/d, pt notes generalized weakness, Zofran and ivf being adm, pt denies fever chills, chest pain or sob at this time. Dorothy BURGER

## 2023-07-14 NOTE — ED PROVIDER NOTE - OBJECTIVE STATEMENT
Patient is a 38-year-old female with past medical history of anxiety bipolar borderline personality disorder depression IBS recurrent UTIs kidney stones history of C. difficile coming in complaining of abdominal pain nausea vomiting and diarrhea for the last few days.  Patient states she finished a course of doxycycline for bronchitis she had last week.  Patient states she is having watery diarrhea about every hour similar to her previous C. difficile.  Patient denies any fever or chills.  Patient states she also feels like she has UTI symptoms since the last few days.  Patient denies any chest pain shortness of breath fevers at home.  Patient states she went to urgent care advised to come to emergency room.  She has history of cholecystectomy and hernia repair.

## 2023-07-14 NOTE — ED PROVIDER NOTE - CARE PROVIDER_API CALL
Rudi Hayden  Gastroenterology  [address] [place] [address]  Phone: [phone]  Fax: [phone]  Follow Up Time:

## 2023-07-14 NOTE — ED PROVIDER NOTE - PROGRESS NOTE DETAILS
ct shows colitis and possible tree bud opacity recent bronchitis advised repeat imaging for resolution   ua no uti will wait for culture pt unabel to give stool sample but wants treatment for c diff advised f/u with gi

## 2023-07-14 NOTE — ED ADULT NURSE NOTE - CHIEF COMPLAINT QUOTE
38yr yr old female a&ox4 into ED from urgent care c/o n/d, pt notes generalized weakness, Zofran and ivf being adm, pt denies fever chills, chest pain or sob at this time. Dorothy BURGER

## 2023-07-14 NOTE — ED PROVIDER NOTE - NSFOLLOWUPINSTRUCTIONS_ED_ALL_ED_FT
Follow up with GI  return to er for any worsening symptoms     Colitis    Colitis is a condition in which the colon is inflamed. It can cause diarrhea, blood in the stool, and abdominal pain. Colitis can last a short time (be acute), or it may last a long time (become chronic).    What are the causes?  This condition may be caused by:  Infections from viruses or bacteria.  A reaction to medicine.  Certain autoimmune diseases, such as Crohn's disease or ulcerative colitis.  Radiation treatment.  Decreased blood flow to the bowel (ischemia).  What are the signs or symptoms?  Symptoms of this condition include:  Diarrhea, blood in the stool, or black, tarry stool.  Pain in the joints or abdominal pain.  Fever or fatigue.  Vomiting.  Weight loss.  Bloating.  Having fewer bowel movements than usual.  A strong and sudden urge to have a bowel movement.  Feeling like the bowel is not empty after a bowel movement.  How is this diagnosed?  This condition may be diagnosed based on a stool test and a blood test. You may also have other tests, such as:  X-rays.  CT scan.  Colonoscopy.  Endoscopy.  Biopsy.  How is this treated?  Treatment for this condition depends on the cause. This condition may be treated with:  Steps to rest the bowel, such as not eating or drinking for a period of time.  Fluids that are given through an IV.  Medicine for pain and diarrhea.  Antibiotic medicines.  Cortisone medicines.  Surgery.  Follow these instructions at home:  Eating and drinking      Follow instructions from your health care provider about eating or drinking restrictions.  Drink enough fluid to keep your urine pale yellow.  Work with a dietitian to determine whether certain foods cause your condition to flare up.  Avoid foods or drinks that cause flare-ups.  Eat a well-balanced diet.  General instructions    If you were prescribed an antibiotic medicine, take it as told by your health care provider. Do not stop taking the antibiotic even if you start to feel better.  Take over-the-counter and prescription medicines only as told by your health care provider.  Keep all follow-up visits. This is important.  Contact a health care provider if:  Your symptoms do not go away.  You develop new symptoms.  Get help right away if:  You have a fever that does not go away with treatment.  You develop chills.  You have extreme weakness, fainting, or dehydration.  You vomit repeatedly.  You develop severe pain in your abdomen.  You pass bloody or tarry stool.  Summary  Colitis is a condition in which the colon is inflamed. Colitis can last a short time (be acute), or it may last a long time (become chronic).  Treatment for this condition depends on the cause and may include resting the bowel, taking medicines, or having surgery.  If you were prescribed an antibiotic medicine, take it as told by your health care provider. Do not stop taking the antibiotic even if you start to feel better.  Get help right away if you develop severe pain in your abdomen.  Keep all follow-up visits. This is important.  This information is not intended to replace advice given to you by your health care provider. Make sure you discuss any questions you have with your health care provider.

## 2023-07-14 NOTE — ED PROVIDER NOTE - CLINICAL SUMMARY MEDICAL DECISION MAKING FREE TEXT BOX
38-year-old female with significant past medical history for C. difficile, recurrent UTI, depression, anxiety, bipolar disorder presents to the ED with complaints of diffuse abdominal pain decreased p.o. intake and diarrhea consistent with similar symptoms of C. difficile.  Labs, CT, IV fluids.

## 2023-07-14 NOTE — ED PROVIDER NOTE - NS ED ATTENDING STATEMENT MOD
This was a shared visit with the [facility]. I reviewed and verified the documentation and independently performed the documented:

## 2023-07-17 LAB
CULTURE RESULTS: SIGNIFICANT CHANGE UP
SPECIMEN SOURCE: SIGNIFICANT CHANGE UP

## 2023-08-01 ENCOUNTER — EMERGENCY (EMERGENCY)
Facility: HOSPITAL | Age: 38
LOS: 1 days | Discharge: HOME | End: 2023-08-01
Attending: EMERGENCY MEDICINE | Admitting: EMERGENCY MEDICINE
Payer: MEDICAID

## 2023-08-01 VITALS
HEART RATE: 81 BPM | OXYGEN SATURATION: 100 % | DIASTOLIC BLOOD PRESSURE: 74 MMHG | TEMPERATURE: 98 F | RESPIRATION RATE: 16 BRPM | SYSTOLIC BLOOD PRESSURE: 116 MMHG

## 2023-08-01 VITALS
WEIGHT: 169.98 LBS | SYSTOLIC BLOOD PRESSURE: 114 MMHG | HEART RATE: 96 BPM | RESPIRATION RATE: 18 BRPM | DIASTOLIC BLOOD PRESSURE: 80 MMHG | TEMPERATURE: 99 F | OXYGEN SATURATION: 99 % | HEIGHT: 61 IN

## 2023-08-01 DIAGNOSIS — Z90.49 ACQUIRED ABSENCE OF OTHER SPECIFIED PARTS OF DIGESTIVE TRACT: Chronic | ICD-10-CM

## 2023-08-01 DIAGNOSIS — Z90.89 ACQUIRED ABSENCE OF OTHER ORGANS: Chronic | ICD-10-CM

## 2023-08-01 LAB
APPEARANCE UR: CLEAR — SIGNIFICANT CHANGE UP
APPEARANCE UR: CLEAR — SIGNIFICANT CHANGE UP
BILIRUB UR-MCNC: NEGATIVE — SIGNIFICANT CHANGE UP
BILIRUB UR-MCNC: NEGATIVE — SIGNIFICANT CHANGE UP
COLOR SPEC: YELLOW — SIGNIFICANT CHANGE UP
COLOR SPEC: YELLOW — SIGNIFICANT CHANGE UP
DIFF PNL FLD: NEGATIVE — SIGNIFICANT CHANGE UP
DIFF PNL FLD: NEGATIVE — SIGNIFICANT CHANGE UP
GLUCOSE UR QL: NEGATIVE MG/DL — SIGNIFICANT CHANGE UP
GLUCOSE UR QL: NEGATIVE MG/DL — SIGNIFICANT CHANGE UP
HCG UR QL: NEGATIVE — SIGNIFICANT CHANGE UP
KETONES UR-MCNC: ABNORMAL MG/DL
KETONES UR-MCNC: NEGATIVE MG/DL — SIGNIFICANT CHANGE UP
LEUKOCYTE ESTERASE UR-ACNC: ABNORMAL
LEUKOCYTE ESTERASE UR-ACNC: NEGATIVE — SIGNIFICANT CHANGE UP
NITRITE UR-MCNC: NEGATIVE — SIGNIFICANT CHANGE UP
NITRITE UR-MCNC: NEGATIVE — SIGNIFICANT CHANGE UP
PH UR: 5.5 — SIGNIFICANT CHANGE UP (ref 5–8)
PH UR: 5.5 — SIGNIFICANT CHANGE UP (ref 5–8)
PROT UR-MCNC: NEGATIVE MG/DL — SIGNIFICANT CHANGE UP
PROT UR-MCNC: NEGATIVE MG/DL — SIGNIFICANT CHANGE UP
SP GR SPEC: 1.02 — SIGNIFICANT CHANGE UP (ref 1–1.03)
SP GR SPEC: 1.02 — SIGNIFICANT CHANGE UP (ref 1–1.03)
UROBILINOGEN FLD QL: 0.2 MG/DL — SIGNIFICANT CHANGE UP (ref 0.2–1)
UROBILINOGEN FLD QL: 0.2 MG/DL — SIGNIFICANT CHANGE UP (ref 0.2–1)

## 2023-08-01 PROCEDURE — 96372 THER/PROPH/DIAG INJ SC/IM: CPT

## 2023-08-01 PROCEDURE — 99283 EMERGENCY DEPT VISIT LOW MDM: CPT | Mod: 25

## 2023-08-01 PROCEDURE — 99284 EMERGENCY DEPT VISIT MOD MDM: CPT

## 2023-08-01 PROCEDURE — 81001 URINALYSIS AUTO W/SCOPE: CPT

## 2023-08-01 PROCEDURE — 87086 URINE CULTURE/COLONY COUNT: CPT

## 2023-08-01 PROCEDURE — 81003 URINALYSIS AUTO W/O SCOPE: CPT

## 2023-08-01 PROCEDURE — 81025 URINE PREGNANCY TEST: CPT

## 2023-08-01 RX ORDER — CYCLOBENZAPRINE HYDROCHLORIDE 10 MG/1
1 TABLET, FILM COATED ORAL
Qty: 15 | Refills: 0
Start: 2023-08-01 | End: 2023-08-05

## 2023-08-01 RX ORDER — LIDOCAINE 4 G/100G
1 CREAM TOPICAL
Qty: 5 | Refills: 0
Start: 2023-08-01 | End: 2023-08-05

## 2023-08-01 RX ORDER — KETOROLAC TROMETHAMINE 30 MG/ML
15 SYRINGE (ML) INJECTION ONCE
Refills: 0 | Status: DISCONTINUED | OUTPATIENT
Start: 2023-08-01 | End: 2023-08-01

## 2023-08-01 RX ORDER — LIDOCAINE 4 G/100G
1 CREAM TOPICAL ONCE
Refills: 0 | Status: COMPLETED | OUTPATIENT
Start: 2023-08-01 | End: 2023-08-01

## 2023-08-01 RX ORDER — TRAMADOL HYDROCHLORIDE 50 MG/1
1 TABLET ORAL
Qty: 9 | Refills: 0
Start: 2023-08-01 | End: 2023-08-03

## 2023-08-01 RX ORDER — IBUPROFEN 200 MG
1 TABLET ORAL
Qty: 20 | Refills: 0
Start: 2023-08-01 | End: 2023-08-05

## 2023-08-01 RX ORDER — CYCLOBENZAPRINE HYDROCHLORIDE 10 MG/1
10 TABLET, FILM COATED ORAL ONCE
Refills: 0 | Status: COMPLETED | OUTPATIENT
Start: 2023-08-01 | End: 2023-08-01

## 2023-08-01 RX ORDER — TRAMADOL HYDROCHLORIDE 50 MG/1
25 TABLET ORAL ONCE
Refills: 0 | Status: DISCONTINUED | OUTPATIENT
Start: 2023-08-01 | End: 2023-08-01

## 2023-08-01 RX ORDER — IBUPROFEN 200 MG
600 TABLET ORAL ONCE
Refills: 0 | Status: COMPLETED | OUTPATIENT
Start: 2023-08-01 | End: 2023-08-01

## 2023-08-01 RX ADMIN — TRAMADOL HYDROCHLORIDE 25 MILLIGRAM(S): 50 TABLET ORAL at 20:36

## 2023-08-01 RX ADMIN — Medication 15 MILLIGRAM(S): at 22:40

## 2023-08-01 RX ADMIN — TRAMADOL HYDROCHLORIDE 25 MILLIGRAM(S): 50 TABLET ORAL at 21:00

## 2023-08-01 RX ADMIN — Medication 15 MILLIGRAM(S): at 22:11

## 2023-08-01 RX ADMIN — CYCLOBENZAPRINE HYDROCHLORIDE 10 MILLIGRAM(S): 10 TABLET, FILM COATED ORAL at 20:36

## 2023-08-01 RX ADMIN — Medication 600 MILLIGRAM(S): at 21:00

## 2023-08-01 RX ADMIN — Medication 600 MILLIGRAM(S): at 20:36

## 2023-08-01 RX ADMIN — LIDOCAINE 1 PATCH: 4 CREAM TOPICAL at 20:35

## 2023-08-01 NOTE — ED ADULT NURSE NOTE - NSICDXPASTMEDICALHX_GEN_ALL_CORE_FT
PAST MEDICAL HISTORY:  Anxiety     Borderline personality disorder     Depression     IBS (irritable bowel syndrome)     Recurrent UTI     Renal colic

## 2023-08-01 NOTE — ED ADULT TRIAGE NOTE - CHIEF COMPLAINT QUOTE
Patient complaining of lower back pain x1 day, states notices pink light red blood while wiping, pain while having BM.

## 2023-08-01 NOTE — ED PROVIDER NOTE - NEUROLOGICAL LEVEL OF CONSCIOUSNESS
Straight leg raise positive on R. leg. No numbness or tingling, no focal neurological deficits/alert/follows commands

## 2023-08-01 NOTE — ED PROVIDER NOTE - PROGRESS NOTE DETAILS
Pt w no evidence of uti. Pt offered CT, but declines. Patient feels better, return precautions provided, and is ready for d/c.

## 2023-08-01 NOTE — ED PROVIDER NOTE - OBJECTIVE STATEMENT
Patient is a 38-year-old lady with a past medical history of bipolar disorder, who presents to the ED because of back pain.  Has been having some back pain and increased urinary frequency over the past week.  Denies any dysuria or fever, no vaginal discharge.  Pain is worse when she is moving her leg, and after she has a bowel movement.  Pain is on the left flank.  Has had kidney stone in the past, does not feel like a kidney stone.  No abdominal pain, no nausea, vomiting, or diarrhea.  No numbness or tingling, no bowel or bladder incontinence.  Denies any trauma.

## 2023-08-01 NOTE — ED ADULT NURSE NOTE - NSICDXFAMILYHX_GEN_ALL_CORE_FT
FAMILY HISTORY:  Father  Still living? Unknown  Family history of gallbladder disease, Age at diagnosis: Age Unknown    Mother  Still living? Unknown  Family history of gallbladder disease, Age at diagnosis: Age Unknown

## 2023-08-01 NOTE — ED ADULT NURSE NOTE - OBJECTIVE STATEMENT
pt a/o x 4 with a calm affect c/o lower left back pains.  patient ambulating on own, but in visible pain.  pending urine studies

## 2023-08-02 LAB
CULTURE RESULTS: SIGNIFICANT CHANGE UP
SPECIMEN SOURCE: SIGNIFICANT CHANGE UP

## 2023-08-04 LAB
CULTURE RESULTS: SIGNIFICANT CHANGE UP
SPECIMEN SOURCE: SIGNIFICANT CHANGE UP

## 2023-09-29 ENCOUNTER — NON-APPOINTMENT (OUTPATIENT)
Age: 38
End: 2023-09-29

## 2023-10-09 ENCOUNTER — NON-APPOINTMENT (OUTPATIENT)
Age: 38
End: 2023-10-09

## 2023-11-14 ENCOUNTER — OFFICE (OUTPATIENT)
Dept: URBAN - METROPOLITAN AREA CLINIC 88 | Facility: CLINIC | Age: 38
Setting detail: OPHTHALMOLOGY
End: 2023-11-14
Payer: MEDICAID

## 2023-11-14 DIAGNOSIS — H52.13: ICD-10-CM

## 2023-11-14 DIAGNOSIS — H16.223: ICD-10-CM

## 2023-11-14 DIAGNOSIS — H10.433: ICD-10-CM

## 2023-11-14 PROCEDURE — 92012 INTRM OPH EXAM EST PATIENT: CPT | Performed by: OPTOMETRIST

## 2023-11-14 PROCEDURE — 92015 DETERMINE REFRACTIVE STATE: CPT | Performed by: OPTOMETRIST

## 2023-11-14 ASSESSMENT — REFRACTION_CURRENTRX
OS_CYLINDER: -1.00
OD_VPRISM_DIRECTION: SV
OD_SPHERE: -3.00
OS_SPHERE: -3.00
OS_VPRISM_DIRECTION: SV
OD_OVR_VA: 20/
OS_AXIS: 139
OD_CYLINDER: -0.50
OD_AXIS: 036
OS_OVR_VA: 20/

## 2023-11-14 ASSESSMENT — SPHEQUIV_DERIVED
OD_SPHEQUIV: -3.125
OS_SPHEQUIV: -3.5
OS_SPHEQUIV: -3.25
OD_SPHEQUIV: -3.375

## 2023-11-14 ASSESSMENT — CONFRONTATIONAL VISUAL FIELD TEST (CVF)
OD_FINDINGS: FULL
OS_FINDINGS: FULL

## 2023-11-14 ASSESSMENT — REFRACTION_MANIFEST
OD_SPHERE: -3.00
OS_VA1: 20/20
OS_CYLINDER: -1.00
OD_CYLINDER: -0.75
OS_SPHERE: -3.00
OD_VA1: 20/20
OD_AXIS: 016
OS_AXIS: 159

## 2023-11-14 ASSESSMENT — REFRACTION_AUTOREFRACTION
OS_CYLINDER: -1.00
OD_AXIS: 016
OD_CYLINDER: -0.75
OS_SPHERE: -2.75
OS_AXIS: 159
OD_SPHERE: -2.75

## 2023-11-14 ASSESSMENT — SUPERFICIAL PUNCTATE KERATITIS (SPK)
OS_SPK: 2+
OD_SPK: 2+

## 2023-12-05 NOTE — ED PROVIDER NOTE - PATIENT PORTAL LINK FT
Prior Authorization came for the patient's Rybelsus 3 mg tablets. Per the chart this medication had been discontinued on 5/10/23. The MA called the patient and informed her to call the office to answer a question regarding this medication. Patient has not been on this medication for a month per a telephone call. You can access the FollowMyHealth Patient Portal offered by [facility] by registering at the following website: [email or web address]. By joining [facility]’s FollowMyHealth portal, you will also be able to view your health information using other applications (apps) compatible with our system.

## 2023-12-19 ENCOUNTER — INPATIENT (INPATIENT)
Facility: HOSPITAL | Age: 38
LOS: 5 days | Discharge: HOME | DRG: 392 | End: 2023-12-25
Attending: FAMILY MEDICINE | Admitting: STUDENT IN AN ORGANIZED HEALTH CARE EDUCATION/TRAINING PROGRAM
Payer: MEDICAID

## 2023-12-19 VITALS
SYSTOLIC BLOOD PRESSURE: 115 MMHG | RESPIRATION RATE: 16 BRPM | HEIGHT: 61 IN | OXYGEN SATURATION: 97 % | WEIGHT: 169.98 LBS | HEART RATE: 95 BPM | DIASTOLIC BLOOD PRESSURE: 76 MMHG | TEMPERATURE: 99 F

## 2023-12-19 DIAGNOSIS — Z90.49 ACQUIRED ABSENCE OF OTHER SPECIFIED PARTS OF DIGESTIVE TRACT: Chronic | ICD-10-CM

## 2023-12-19 DIAGNOSIS — K51.00 ULCERATIVE (CHRONIC) PANCOLITIS WITHOUT COMPLICATIONS: ICD-10-CM

## 2023-12-19 DIAGNOSIS — F60.3 BORDERLINE PERSONALITY DISORDER: ICD-10-CM

## 2023-12-19 DIAGNOSIS — Z90.89 ACQUIRED ABSENCE OF OTHER ORGANS: Chronic | ICD-10-CM

## 2023-12-19 DIAGNOSIS — K58.9 IRRITABLE BOWEL SYNDROME WITHOUT DIARRHEA: ICD-10-CM

## 2023-12-19 LAB
ALBUMIN SERPL ELPH-MCNC: 4 G/DL — SIGNIFICANT CHANGE UP (ref 3.3–5)
ALBUMIN SERPL ELPH-MCNC: 4 G/DL — SIGNIFICANT CHANGE UP (ref 3.3–5)
ALP SERPL-CCNC: 57 U/L — SIGNIFICANT CHANGE UP (ref 40–120)
ALP SERPL-CCNC: 57 U/L — SIGNIFICANT CHANGE UP (ref 40–120)
ALT FLD-CCNC: 33 U/L — SIGNIFICANT CHANGE UP (ref 12–78)
ALT FLD-CCNC: 33 U/L — SIGNIFICANT CHANGE UP (ref 12–78)
ANION GAP SERPL CALC-SCNC: 11 MMOL/L — SIGNIFICANT CHANGE UP (ref 5–17)
ANION GAP SERPL CALC-SCNC: 11 MMOL/L — SIGNIFICANT CHANGE UP (ref 5–17)
APPEARANCE UR: CLEAR — SIGNIFICANT CHANGE UP
APPEARANCE UR: CLEAR — SIGNIFICANT CHANGE UP
AST SERPL-CCNC: 19 U/L — SIGNIFICANT CHANGE UP (ref 15–37)
AST SERPL-CCNC: 19 U/L — SIGNIFICANT CHANGE UP (ref 15–37)
BASOPHILS # BLD AUTO: 0.05 K/UL — SIGNIFICANT CHANGE UP (ref 0–0.2)
BASOPHILS # BLD AUTO: 0.05 K/UL — SIGNIFICANT CHANGE UP (ref 0–0.2)
BASOPHILS NFR BLD AUTO: 0.5 % — SIGNIFICANT CHANGE UP (ref 0–2)
BASOPHILS NFR BLD AUTO: 0.5 % — SIGNIFICANT CHANGE UP (ref 0–2)
BILIRUB SERPL-MCNC: 0.8 MG/DL — SIGNIFICANT CHANGE UP (ref 0.2–1.2)
BILIRUB SERPL-MCNC: 0.8 MG/DL — SIGNIFICANT CHANGE UP (ref 0.2–1.2)
BILIRUB UR-MCNC: NEGATIVE — SIGNIFICANT CHANGE UP
BILIRUB UR-MCNC: NEGATIVE — SIGNIFICANT CHANGE UP
BUN SERPL-MCNC: 12 MG/DL — SIGNIFICANT CHANGE UP (ref 7–23)
BUN SERPL-MCNC: 12 MG/DL — SIGNIFICANT CHANGE UP (ref 7–23)
CALCIUM SERPL-MCNC: 9.4 MG/DL — SIGNIFICANT CHANGE UP (ref 8.5–10.1)
CALCIUM SERPL-MCNC: 9.4 MG/DL — SIGNIFICANT CHANGE UP (ref 8.5–10.1)
CHLORIDE SERPL-SCNC: 107 MMOL/L — SIGNIFICANT CHANGE UP (ref 96–108)
CHLORIDE SERPL-SCNC: 107 MMOL/L — SIGNIFICANT CHANGE UP (ref 96–108)
CO2 SERPL-SCNC: 22 MMOL/L — SIGNIFICANT CHANGE UP (ref 22–31)
CO2 SERPL-SCNC: 22 MMOL/L — SIGNIFICANT CHANGE UP (ref 22–31)
COLOR SPEC: YELLOW — SIGNIFICANT CHANGE UP
COLOR SPEC: YELLOW — SIGNIFICANT CHANGE UP
CREAT SERPL-MCNC: 0.95 MG/DL — SIGNIFICANT CHANGE UP (ref 0.5–1.3)
CREAT SERPL-MCNC: 0.95 MG/DL — SIGNIFICANT CHANGE UP (ref 0.5–1.3)
DIFF PNL FLD: NEGATIVE — SIGNIFICANT CHANGE UP
DIFF PNL FLD: NEGATIVE — SIGNIFICANT CHANGE UP
EGFR: 79 ML/MIN/1.73M2 — SIGNIFICANT CHANGE UP
EGFR: 79 ML/MIN/1.73M2 — SIGNIFICANT CHANGE UP
EOSINOPHIL # BLD AUTO: 0.43 K/UL — SIGNIFICANT CHANGE UP (ref 0–0.5)
EOSINOPHIL # BLD AUTO: 0.43 K/UL — SIGNIFICANT CHANGE UP (ref 0–0.5)
EOSINOPHIL NFR BLD AUTO: 4.4 % — SIGNIFICANT CHANGE UP (ref 0–6)
EOSINOPHIL NFR BLD AUTO: 4.4 % — SIGNIFICANT CHANGE UP (ref 0–6)
ERYTHROCYTE [SEDIMENTATION RATE] IN BLOOD: 7 MM/HR — SIGNIFICANT CHANGE UP (ref 0–15)
ERYTHROCYTE [SEDIMENTATION RATE] IN BLOOD: 7 MM/HR — SIGNIFICANT CHANGE UP (ref 0–15)
FLUAV AG NPH QL: SIGNIFICANT CHANGE UP
FLUAV AG NPH QL: SIGNIFICANT CHANGE UP
FLUBV AG NPH QL: SIGNIFICANT CHANGE UP
FLUBV AG NPH QL: SIGNIFICANT CHANGE UP
GLUCOSE SERPL-MCNC: 145 MG/DL — HIGH (ref 70–99)
GLUCOSE SERPL-MCNC: 145 MG/DL — HIGH (ref 70–99)
GLUCOSE UR QL: NEGATIVE MG/DL — SIGNIFICANT CHANGE UP
GLUCOSE UR QL: NEGATIVE MG/DL — SIGNIFICANT CHANGE UP
HCG SERPL-ACNC: <1 MIU/ML — SIGNIFICANT CHANGE UP
HCG SERPL-ACNC: <1 MIU/ML — SIGNIFICANT CHANGE UP
HCT VFR BLD CALC: 41.3 % — SIGNIFICANT CHANGE UP (ref 34.5–45)
HCT VFR BLD CALC: 41.3 % — SIGNIFICANT CHANGE UP (ref 34.5–45)
HGB BLD-MCNC: 15.2 G/DL — SIGNIFICANT CHANGE UP (ref 11.5–15.5)
HGB BLD-MCNC: 15.2 G/DL — SIGNIFICANT CHANGE UP (ref 11.5–15.5)
IMM GRANULOCYTES NFR BLD AUTO: 0.4 % — SIGNIFICANT CHANGE UP (ref 0–0.9)
IMM GRANULOCYTES NFR BLD AUTO: 0.4 % — SIGNIFICANT CHANGE UP (ref 0–0.9)
KETONES UR-MCNC: NEGATIVE MG/DL — SIGNIFICANT CHANGE UP
KETONES UR-MCNC: NEGATIVE MG/DL — SIGNIFICANT CHANGE UP
LEUKOCYTE ESTERASE UR-ACNC: ABNORMAL
LEUKOCYTE ESTERASE UR-ACNC: ABNORMAL
LIDOCAIN IGE QN: 24 U/L — SIGNIFICANT CHANGE UP (ref 13–75)
LIDOCAIN IGE QN: 24 U/L — SIGNIFICANT CHANGE UP (ref 13–75)
LYMPHOCYTES # BLD AUTO: 2.06 K/UL — SIGNIFICANT CHANGE UP (ref 1–3.3)
LYMPHOCYTES # BLD AUTO: 2.06 K/UL — SIGNIFICANT CHANGE UP (ref 1–3.3)
LYMPHOCYTES # BLD AUTO: 21.3 % — SIGNIFICANT CHANGE UP (ref 13–44)
LYMPHOCYTES # BLD AUTO: 21.3 % — SIGNIFICANT CHANGE UP (ref 13–44)
MCHC RBC-ENTMCNC: 32 PG — SIGNIFICANT CHANGE UP (ref 27–34)
MCHC RBC-ENTMCNC: 32 PG — SIGNIFICANT CHANGE UP (ref 27–34)
MCHC RBC-ENTMCNC: 36.8 GM/DL — HIGH (ref 32–36)
MCHC RBC-ENTMCNC: 36.8 GM/DL — HIGH (ref 32–36)
MCV RBC AUTO: 86.9 FL — SIGNIFICANT CHANGE UP (ref 80–100)
MCV RBC AUTO: 86.9 FL — SIGNIFICANT CHANGE UP (ref 80–100)
MONOCYTES # BLD AUTO: 0.57 K/UL — SIGNIFICANT CHANGE UP (ref 0–0.9)
MONOCYTES # BLD AUTO: 0.57 K/UL — SIGNIFICANT CHANGE UP (ref 0–0.9)
MONOCYTES NFR BLD AUTO: 5.9 % — SIGNIFICANT CHANGE UP (ref 2–14)
MONOCYTES NFR BLD AUTO: 5.9 % — SIGNIFICANT CHANGE UP (ref 2–14)
NEUTROPHILS # BLD AUTO: 6.54 K/UL — SIGNIFICANT CHANGE UP (ref 1.8–7.4)
NEUTROPHILS # BLD AUTO: 6.54 K/UL — SIGNIFICANT CHANGE UP (ref 1.8–7.4)
NEUTROPHILS NFR BLD AUTO: 67.5 % — SIGNIFICANT CHANGE UP (ref 43–77)
NEUTROPHILS NFR BLD AUTO: 67.5 % — SIGNIFICANT CHANGE UP (ref 43–77)
NITRITE UR-MCNC: NEGATIVE — SIGNIFICANT CHANGE UP
NITRITE UR-MCNC: NEGATIVE — SIGNIFICANT CHANGE UP
NRBC # BLD: 0 /100 WBCS — SIGNIFICANT CHANGE UP (ref 0–0)
NRBC # BLD: 0 /100 WBCS — SIGNIFICANT CHANGE UP (ref 0–0)
PH UR: 5.5 — SIGNIFICANT CHANGE UP (ref 5–8)
PH UR: 5.5 — SIGNIFICANT CHANGE UP (ref 5–8)
PLATELET # BLD AUTO: 340 K/UL — SIGNIFICANT CHANGE UP (ref 150–400)
PLATELET # BLD AUTO: 340 K/UL — SIGNIFICANT CHANGE UP (ref 150–400)
POTASSIUM SERPL-MCNC: 4.6 MMOL/L — SIGNIFICANT CHANGE UP (ref 3.5–5.3)
POTASSIUM SERPL-MCNC: 4.6 MMOL/L — SIGNIFICANT CHANGE UP (ref 3.5–5.3)
POTASSIUM SERPL-SCNC: 4.6 MMOL/L — SIGNIFICANT CHANGE UP (ref 3.5–5.3)
POTASSIUM SERPL-SCNC: 4.6 MMOL/L — SIGNIFICANT CHANGE UP (ref 3.5–5.3)
PROT SERPL-MCNC: 7.7 G/DL — SIGNIFICANT CHANGE UP (ref 6–8.3)
PROT SERPL-MCNC: 7.7 G/DL — SIGNIFICANT CHANGE UP (ref 6–8.3)
PROT UR-MCNC: SIGNIFICANT CHANGE UP MG/DL
PROT UR-MCNC: SIGNIFICANT CHANGE UP MG/DL
RAPID RVP RESULT: SIGNIFICANT CHANGE UP
RAPID RVP RESULT: SIGNIFICANT CHANGE UP
RBC # BLD: 4.75 M/UL — SIGNIFICANT CHANGE UP (ref 3.8–5.2)
RBC # BLD: 4.75 M/UL — SIGNIFICANT CHANGE UP (ref 3.8–5.2)
RBC # FLD: 12.1 % — SIGNIFICANT CHANGE UP (ref 10.3–14.5)
RBC # FLD: 12.1 % — SIGNIFICANT CHANGE UP (ref 10.3–14.5)
RSV RNA NPH QL NAA+NON-PROBE: SIGNIFICANT CHANGE UP
RSV RNA NPH QL NAA+NON-PROBE: SIGNIFICANT CHANGE UP
SARS-COV-2 RNA SPEC QL NAA+PROBE: SIGNIFICANT CHANGE UP
SODIUM SERPL-SCNC: 140 MMOL/L — SIGNIFICANT CHANGE UP (ref 135–145)
SODIUM SERPL-SCNC: 140 MMOL/L — SIGNIFICANT CHANGE UP (ref 135–145)
SP GR SPEC: 1.02 — SIGNIFICANT CHANGE UP (ref 1–1.03)
SP GR SPEC: 1.02 — SIGNIFICANT CHANGE UP (ref 1–1.03)
UROBILINOGEN FLD QL: 0.2 MG/DL — SIGNIFICANT CHANGE UP (ref 0.2–1)
UROBILINOGEN FLD QL: 0.2 MG/DL — SIGNIFICANT CHANGE UP (ref 0.2–1)
WBC # BLD: 9.69 K/UL — SIGNIFICANT CHANGE UP (ref 3.8–10.5)
WBC # BLD: 9.69 K/UL — SIGNIFICANT CHANGE UP (ref 3.8–10.5)
WBC # FLD AUTO: 9.69 K/UL — SIGNIFICANT CHANGE UP (ref 3.8–10.5)
WBC # FLD AUTO: 9.69 K/UL — SIGNIFICANT CHANGE UP (ref 3.8–10.5)

## 2023-12-19 PROCEDURE — 74177 CT ABD & PELVIS W/CONTRAST: CPT | Mod: 26,MA

## 2023-12-19 PROCEDURE — 99285 EMERGENCY DEPT VISIT HI MDM: CPT

## 2023-12-19 RX ORDER — SODIUM CHLORIDE 9 MG/ML
1000 INJECTION INTRAMUSCULAR; INTRAVENOUS; SUBCUTANEOUS
Refills: 0 | Status: DISCONTINUED | OUTPATIENT
Start: 2023-12-19 | End: 2023-12-21

## 2023-12-19 RX ORDER — METRONIDAZOLE 500 MG
500 TABLET ORAL ONCE
Refills: 0 | Status: COMPLETED | OUTPATIENT
Start: 2023-12-19 | End: 2023-12-19

## 2023-12-19 RX ORDER — ACETAMINOPHEN 500 MG
650 TABLET ORAL EVERY 6 HOURS
Refills: 0 | Status: DISCONTINUED | OUTPATIENT
Start: 2023-12-19 | End: 2023-12-25

## 2023-12-19 RX ORDER — KETOROLAC TROMETHAMINE 30 MG/ML
30 SYRINGE (ML) INJECTION ONCE
Refills: 0 | Status: DISCONTINUED | OUTPATIENT
Start: 2023-12-19 | End: 2023-12-19

## 2023-12-19 RX ORDER — ONDANSETRON 8 MG/1
4 TABLET, FILM COATED ORAL ONCE
Refills: 0 | Status: COMPLETED | OUTPATIENT
Start: 2023-12-19 | End: 2023-12-19

## 2023-12-19 RX ORDER — METRONIDAZOLE 500 MG
500 TABLET ORAL EVERY 8 HOURS
Refills: 0 | Status: DISCONTINUED | OUTPATIENT
Start: 2023-12-20 | End: 2023-12-22

## 2023-12-19 RX ORDER — ONDANSETRON 8 MG/1
4 TABLET, FILM COATED ORAL EVERY 6 HOURS
Refills: 0 | Status: DISCONTINUED | OUTPATIENT
Start: 2023-12-19 | End: 2023-12-23

## 2023-12-19 RX ORDER — CIPROFLOXACIN LACTATE 400MG/40ML
400 VIAL (ML) INTRAVENOUS EVERY 12 HOURS
Refills: 0 | Status: DISCONTINUED | OUTPATIENT
Start: 2023-12-20 | End: 2023-12-22

## 2023-12-19 RX ORDER — CITALOPRAM 10 MG/1
40 TABLET, FILM COATED ORAL DAILY
Refills: 0 | Status: DISCONTINUED | OUTPATIENT
Start: 2023-12-19 | End: 2023-12-22

## 2023-12-19 RX ORDER — CIPROFLOXACIN LACTATE 400MG/40ML
VIAL (ML) INTRAVENOUS
Refills: 0 | Status: DISCONTINUED | OUTPATIENT
Start: 2023-12-19 | End: 2023-12-22

## 2023-12-19 RX ORDER — LAMOTRIGINE 25 MG/1
100 TABLET, ORALLY DISINTEGRATING ORAL
Refills: 0 | Status: DISCONTINUED | OUTPATIENT
Start: 2023-12-19 | End: 2023-12-25

## 2023-12-19 RX ORDER — MORPHINE SULFATE 50 MG/1
2 CAPSULE, EXTENDED RELEASE ORAL EVERY 4 HOURS
Refills: 0 | Status: DISCONTINUED | OUTPATIENT
Start: 2023-12-19 | End: 2023-12-20

## 2023-12-19 RX ORDER — ACETAMINOPHEN 500 MG
1000 TABLET ORAL ONCE
Refills: 0 | Status: COMPLETED | OUTPATIENT
Start: 2023-12-19 | End: 2023-12-19

## 2023-12-19 RX ORDER — FAMOTIDINE 10 MG/ML
20 INJECTION INTRAVENOUS ONCE
Refills: 0 | Status: COMPLETED | OUTPATIENT
Start: 2023-12-19 | End: 2023-12-19

## 2023-12-19 RX ORDER — OXYCODONE AND ACETAMINOPHEN 5; 325 MG/1; MG/1
2 TABLET ORAL EVERY 4 HOURS
Refills: 0 | Status: DISCONTINUED | OUTPATIENT
Start: 2023-12-19 | End: 2023-12-23

## 2023-12-19 RX ORDER — ONDANSETRON 8 MG/1
4 TABLET, FILM COATED ORAL EVERY 6 HOURS
Refills: 0 | Status: DISCONTINUED | OUTPATIENT
Start: 2023-12-19 | End: 2023-12-19

## 2023-12-19 RX ORDER — SODIUM CHLORIDE 9 MG/ML
1000 INJECTION INTRAMUSCULAR; INTRAVENOUS; SUBCUTANEOUS ONCE
Refills: 0 | Status: COMPLETED | OUTPATIENT
Start: 2023-12-19 | End: 2023-12-19

## 2023-12-19 RX ORDER — MORPHINE SULFATE 50 MG/1
4 CAPSULE, EXTENDED RELEASE ORAL ONCE
Refills: 0 | Status: DISCONTINUED | OUTPATIENT
Start: 2023-12-19 | End: 2023-12-19

## 2023-12-19 RX ORDER — PANTOPRAZOLE SODIUM 20 MG/1
40 TABLET, DELAYED RELEASE ORAL
Refills: 0 | Status: DISCONTINUED | OUTPATIENT
Start: 2023-12-19 | End: 2023-12-20

## 2023-12-19 RX ORDER — METRONIDAZOLE 500 MG
TABLET ORAL
Refills: 0 | Status: DISCONTINUED | OUTPATIENT
Start: 2023-12-19 | End: 2023-12-22

## 2023-12-19 RX ORDER — METOCLOPRAMIDE HCL 10 MG
10 TABLET ORAL ONCE
Refills: 0 | Status: COMPLETED | OUTPATIENT
Start: 2023-12-19 | End: 2023-12-19

## 2023-12-19 RX ORDER — SIMETHICONE 80 MG/1
80 TABLET, CHEWABLE ORAL EVERY 6 HOURS
Refills: 0 | Status: DISCONTINUED | OUTPATIENT
Start: 2023-12-19 | End: 2023-12-25

## 2023-12-19 RX ORDER — CIPROFLOXACIN LACTATE 400MG/40ML
400 VIAL (ML) INTRAVENOUS ONCE
Refills: 0 | Status: COMPLETED | OUTPATIENT
Start: 2023-12-19 | End: 2023-12-19

## 2023-12-19 RX ORDER — BUPROPION HYDROCHLORIDE 150 MG/1
300 TABLET, EXTENDED RELEASE ORAL DAILY
Refills: 0 | Status: DISCONTINUED | OUTPATIENT
Start: 2023-12-19 | End: 2023-12-25

## 2023-12-19 RX ORDER — CITALOPRAM 10 MG/1
80 TABLET, FILM COATED ORAL DAILY
Refills: 0 | Status: DISCONTINUED | OUTPATIENT
Start: 2023-12-19 | End: 2023-12-19

## 2023-12-19 RX ADMIN — MORPHINE SULFATE 2 MILLIGRAM(S): 50 CAPSULE, EXTENDED RELEASE ORAL at 23:38

## 2023-12-19 RX ADMIN — Medication 100 MILLIGRAM(S): at 19:05

## 2023-12-19 RX ADMIN — MORPHINE SULFATE 4 MILLIGRAM(S): 50 CAPSULE, EXTENDED RELEASE ORAL at 12:43

## 2023-12-19 RX ADMIN — SODIUM CHLORIDE 1000 MILLILITER(S): 9 INJECTION INTRAMUSCULAR; INTRAVENOUS; SUBCUTANEOUS at 14:41

## 2023-12-19 RX ADMIN — MORPHINE SULFATE 4 MILLIGRAM(S): 50 CAPSULE, EXTENDED RELEASE ORAL at 18:34

## 2023-12-19 RX ADMIN — SODIUM CHLORIDE 1000 MILLILITER(S): 9 INJECTION INTRAMUSCULAR; INTRAVENOUS; SUBCUTANEOUS at 13:42

## 2023-12-19 RX ADMIN — ONDANSETRON 4 MILLIGRAM(S): 8 TABLET, FILM COATED ORAL at 12:43

## 2023-12-19 RX ADMIN — MORPHINE SULFATE 2 MILLIGRAM(S): 50 CAPSULE, EXTENDED RELEASE ORAL at 22:42

## 2023-12-19 RX ADMIN — Medication 1000 MILLIGRAM(S): at 14:30

## 2023-12-19 RX ADMIN — FAMOTIDINE 20 MILLIGRAM(S): 10 INJECTION INTRAVENOUS at 12:43

## 2023-12-19 RX ADMIN — Medication 10 MILLIGRAM(S): at 17:01

## 2023-12-19 RX ADMIN — PANTOPRAZOLE SODIUM 40 MILLIGRAM(S): 20 TABLET, DELAYED RELEASE ORAL at 19:12

## 2023-12-19 RX ADMIN — Medication 30 MILLIGRAM(S): at 14:41

## 2023-12-19 RX ADMIN — Medication 1000 MILLIGRAM(S): at 13:20

## 2023-12-19 RX ADMIN — SODIUM CHLORIDE 1000 MILLILITER(S): 9 INJECTION INTRAMUSCULAR; INTRAVENOUS; SUBCUTANEOUS at 15:45

## 2023-12-19 RX ADMIN — Medication 400 MILLIGRAM(S): at 13:19

## 2023-12-19 RX ADMIN — MORPHINE SULFATE 4 MILLIGRAM(S): 50 CAPSULE, EXTENDED RELEASE ORAL at 14:30

## 2023-12-19 RX ADMIN — SODIUM CHLORIDE 50 MILLILITER(S): 9 INJECTION INTRAMUSCULAR; INTRAVENOUS; SUBCUTANEOUS at 21:07

## 2023-12-19 RX ADMIN — Medication 30 MILLIGRAM(S): at 17:00

## 2023-12-19 RX ADMIN — SODIUM CHLORIDE 1000 MILLILITER(S): 9 INJECTION INTRAMUSCULAR; INTRAVENOUS; SUBCUTANEOUS at 12:43

## 2023-12-19 RX ADMIN — ONDANSETRON 4 MILLIGRAM(S): 8 TABLET, FILM COATED ORAL at 22:40

## 2023-12-19 RX ADMIN — Medication 200 MILLIGRAM(S): at 21:09

## 2023-12-19 NOTE — H&P ADULT - PROBLEM SELECTOR PLAN 1
pt with acute abd pain not improving and hx of ibs with mulitple colonscopies and egd in past  gi eval noted - recommend admit for further treatment given no improvement in er  iv cipro and flagyl  ivf  clear diet  check stool studies   supportive care  iv ppi

## 2023-12-19 NOTE — ED PROVIDER NOTE - OBJECTIVE STATEMENT
Patient is a 38-year-old female with past medical history of IBS, diabetic, depression, anxiety, borderline personality disorder, current recurrent UTIs presents with abdominal pain.  Patient reports she has had vomiting and diarrhea for the past week.  Patient reports last night she developed periumbilical abdominal pain described as sharp stabbing.  Patient did not take anything for symptoms. pt endorse associated urinary frequency. Patient reports she has chronic abdominal issues.  Patient had CAT scans in the past without acute findings.  Patient denies fever, chest pain, shortness of breath, dysuria, hematuria

## 2023-12-19 NOTE — CONSULT NOTE ADULT - SUBJECTIVE AND OBJECTIVE BOX
[facility]  Kai Villegas PA-C  [address] [place] [id number] [phone]      Chief Complaint:  Patient is a 38y old  Female who presents with a chief complaint of abdominal pain    HPI:  38-year-old female with past medical history of IBS, diabetic, depression, anxiety, borderline personality disorder, current recurrent UTIs presents with abdominal pain.  Patient reports she has had vomiting and diarrhea for the past week.  Patient reports last night she developed periumbilical abdominal pain described as sharp stabbing.  Patient did not take anything for symptoms. pt endorse associated urinary frequency. Patient reports she has chronic abdominal issues.  Patient had CAT scans in the past without acute findings.  Patient denies fever, chest pain, shortness of breath, dysuria, hematuria    INTERVAL HPI:  Pt s/e in emergency department  Discussed same hx as above  Pt states she has had egd and colonoscopy in the past, last egd was in August this year and last colonoscopy was 2 years ago  Currently still c/o abdominal pain and dry heaving and nausea  Discussed CT results with pt    Allergies:  cats (Unknown)  No Known Drug Allergies      Medications:  metoclopramide Injectable 10 milliGRAM(s) IV Push once      PMHX/PSHX:  Renal colic    Borderline personality disorder    Anxiety    Depression    Recurrent UTI    IBS (irritable bowel syndrome)    Status post cholecystectomy    S/P tonsillectomy        Family history:  Family history of gallbladder disease (Father, Mother)      ROS:   General:  No fevers, chills, night sweats, fatigue,   Eyes:  Good vision, no reported pain  ENT:  No sore throat, pain, runny nose, dysphagia  CV:  No pain, palpitations, hypo/hypertension  Resp:  No dyspnea, cough, tachypnea, wheezing  GI:  No pain, No nausea, No vomiting, No diarrhea, No constipation, No weight loss, No fever, No pruritis, No rectal bleeding, No tarry stools, No dysphagia,  :  No pain, bleeding, incontinence, nocturia  Muscle:  No pain, weakness  Neuro:  No weakness, tingling, memory problems  Psych:  No fatigue, insomnia, mood problems, depression  Endocrine:  No polyuria, polydipsia, cold/heat intolerance  Heme:  No petechiae, ecchymosis, easy bruisability  Skin:  No rash, tattoos, scars, edema      PHYSICAL EXAM:   Vital Signs:  Vital Signs Last 24 Hrs  T(C): 37 (19 Dec 2023 13:34), Max: 37.2 (19 Dec 2023 11:35)  T(F): 98.6 (19 Dec 2023 13:34), Max: 99 (19 Dec 2023 11:35)  HR: 86 (19 Dec 2023 13:34) (86 - 95)  BP: 112/74 (19 Dec 2023 13:34) (112/74 - 115/76)  BP(mean): --  RR: 18 (19 Dec 2023 13:34) (16 - 18)  SpO2: 97% (19 Dec 2023 13:34) (97% - 97%)    Parameters below as of 19 Dec 2023 13:34  Patient On (Oxygen Delivery Method): room air      Daily Height in cm: 154.94 (19 Dec 2023 11:35)    Daily     GENERAL:  Appears stated age  HEENT:  NC/AT  CHEST:  Full & symmetric excursion  HEART:  Regular rhythm  ABDOMEN:  Soft, +TTP diffusely, non-distended  EXTEREMITIES:  no cyanosis, clubbing or edema  SKIN:  No rash  NEURO:  Alert    LABS:                        15.2   9.69  )-----------( 340      ( 19 Dec 2023 12:35 )             41.3     12-19    140  |  107  |  12  ----------------------------<  145<H>  4.6   |  22  |  0.95    Ca    9.4      19 Dec 2023 12:35    TPro  7.7  /  Alb  4.0  /  TBili  0.8  /  DBili  x   /  AST  19  /  ALT  33  /  AlkPhos  57  12-19    LIVER FUNCTIONS - ( 19 Dec 2023 12:35 )  Alb: 4.0 g/dL / Pro: 7.7 g/dL / ALK PHOS: 57 U/L / ALT: 33 U/L / AST: 19 U/L / GGT: x             Urinalysis Basic - ( 19 Dec 2023 12:35 )    Color: Yellow / Appearance: Clear / S.018 / pH: x  Gluc: 145 mg/dL / Ketone: Negative mg/dL  / Bili: Negative / Urobili: 0.2 mg/dL   Blood: x / Protein: Trace mg/dL / Nitrite: Negative   Leuk Esterase: Small / RBC: None Seen /HPF / WBC 5 /HPF   Sq Epi: x / Non Sq Epi: x / Bacteria: Moderate /HPF        Lipase serum24         Imaging:  < from: CT Abdomen and Pelvis w/ IV Cont (23 @ 15:04) >    ACC: [id number] EXAM:  CT ABDOMEN AND PELVIS IC   ORDERED BY: ANDRES MCBRIDE     PROCEDURE DATE:  2023          INTERPRETATION:  CLINICAL INFORMATION: Abdominal pain    COMPARISON: CT abdomen 2023    CONTRAST/COMPLICATIONS:  IV Contrast: Omnipaque 350  90 cc administered   10 cc discarded  Oral Contrast: NONE  Complications: None reported at time of study completion    PROCEDURE:  CT of the Abdomen and Pelvis was performed.  Sagittal and coronal reformats were performed.    FINDINGS:  LOWER CHEST: Within normal limits.    LIVER: Enlarged (22.0 cm) homogeneous similar to prior..  BILE DUCTS: Normal caliber.  GALLBLADDER: Cholecystectomy.  SPLEEN: Within normal limits.  PANCREAS: Within normal limits.  ADRENALS: Within normallimits.  KIDNEYS/URETERS: Within normal limits.    BLADDER: Within normal limits.  REPRODUCTIVE ORGANS: Unremarkable    BOWEL: No bowel obstruction mild contiguous colonic wall thickening   consistent with pancolitis. Appendix normal  PERITONEUM: No ascites.  VESSELS: Within normal limits.  RETROPERITONEUM/LYMPH NODES: No lymphadenopathy.  ABDOMINAL WALL: Within normal limits.  BONES: Right femoral head bone islands.    IMPRESSION:  Acute uncomplicated pancolitis    --- End of Report ---    BENJAMIN LEE MD; Attending Radiologist  This document has been electronically signed. Dec 19 2023  3:16PM    < end of copied text >         [facility]  Kai Villegas PA-C  [address] [place] [address] [phone]      Chief Complaint:  Patient is a 38y old  Female who presents with a chief complaint of abdominal pain    HPI:  38-year-old female with past medical history of IBS, diabetic, depression, anxiety, borderline personality disorder, current recurrent UTIs presents with abdominal pain.  Patient reports she has had vomiting and diarrhea for the past week.  Patient reports last night she developed periumbilical abdominal pain described as sharp stabbing.  Patient did not take anything for symptoms. pt endorse associated urinary frequency. Patient reports she has chronic abdominal issues.  Patient had CAT scans in the past without acute findings.  Patient denies fever, chest pain, shortness of breath, dysuria, hematuria    INTERVAL HPI:  Pt s/e in emergency department  Discussed same hx as above  Pt states she has had egd and colonoscopy in the past, last egd was in August this year and last colonoscopy was 2 years ago  Currently still c/o abdominal pain and dry heaving and nausea  Discussed CT results with pt    Allergies:  cats (Unknown)  No Known Drug Allergies      Medications:  metoclopramide Injectable 10 milliGRAM(s) IV Push once      PMHX/PSHX:  Renal colic    Borderline personality disorder    Anxiety    Depression    Recurrent UTI    IBS (irritable bowel syndrome)    Status post cholecystectomy    S/P tonsillectomy        Family history:  Family history of gallbladder disease (Father, Mother)      ROS:   General:  No fevers, chills, night sweats, fatigue,   Eyes:  Good vision, no reported pain  ENT:  No sore throat, pain, runny nose, dysphagia  CV:  No pain, palpitations, hypo/hypertension  Resp:  No dyspnea, cough, tachypnea, wheezing  GI:  No pain, No nausea, No vomiting, No diarrhea, No constipation, No weight loss, No fever, No pruritis, No rectal bleeding, No tarry stools, No dysphagia,  :  No pain, bleeding, incontinence, nocturia  Muscle:  No pain, weakness  Neuro:  No weakness, tingling, memory problems  Psych:  No fatigue, insomnia, mood problems, depression  Endocrine:  No polyuria, polydipsia, cold/heat intolerance  Heme:  No petechiae, ecchymosis, easy bruisability  Skin:  No rash, tattoos, scars, edema      PHYSICAL EXAM:   Vital Signs:  Vital Signs Last 24 Hrs  T(C): 37 (19 Dec 2023 13:34), Max: 37.2 (19 Dec 2023 11:35)  T(F): 98.6 (19 Dec 2023 13:34), Max: 99 (19 Dec 2023 11:35)  HR: 86 (19 Dec 2023 13:34) (86 - 95)  BP: 112/74 (19 Dec 2023 13:34) (112/74 - 115/76)  BP(mean): --  RR: 18 (19 Dec 2023 13:34) (16 - 18)  SpO2: 97% (19 Dec 2023 13:34) (97% - 97%)    Parameters below as of 19 Dec 2023 13:34  Patient On (Oxygen Delivery Method): room air      Daily Height in cm: 154.94 (19 Dec 2023 11:35)    Daily     GENERAL:  Appears stated age  HEENT:  NC/AT  CHEST:  Full & symmetric excursion  HEART:  Regular rhythm  ABDOMEN:  Soft, +TTP diffusely, non-distended  EXTEREMITIES:  no cyanosis, clubbing or edema  SKIN:  No rash  NEURO:  Alert    LABS:                        15.2   9.69  )-----------( 340      ( 19 Dec 2023 12:35 )             41.3     12-19    140  |  107  |  12  ----------------------------<  145<H>  4.6   |  22  |  0.95    Ca    9.4      19 Dec 2023 12:35    TPro  7.7  /  Alb  4.0  /  TBili  0.8  /  DBili  x   /  AST  19  /  ALT  33  /  AlkPhos  57  12-19    LIVER FUNCTIONS - ( 19 Dec 2023 12:35 )  Alb: 4.0 g/dL / Pro: 7.7 g/dL / ALK PHOS: 57 U/L / ALT: 33 U/L / AST: 19 U/L / GGT: x             Urinalysis Basic - ( 19 Dec 2023 12:35 )    Color: Yellow / Appearance: Clear / S.018 / pH: x  Gluc: 145 mg/dL / Ketone: Negative mg/dL  / Bili: Negative / Urobili: 0.2 mg/dL   Blood: x / Protein: Trace mg/dL / Nitrite: Negative   Leuk Esterase: Small / RBC: None Seen /HPF / WBC 5 /HPF   Sq Epi: x / Non Sq Epi: x / Bacteria: Moderate /HPF        Lipase serum24         Imaging:  < from: CT Abdomen and Pelvis w/ IV Cont (23 @ 15:04) >    ACC: [id number] EXAM:  CT ABDOMEN AND PELVIS IC   ORDERED BY: ANDRES MCBRIDE     PROCEDURE DATE:  2023          INTERPRETATION:  CLINICAL INFORMATION: Abdominal pain    COMPARISON: CT abdomen 2023    CONTRAST/COMPLICATIONS:  IV Contrast: Omnipaque 350  90 cc administered   10 cc discarded  Oral Contrast: NONE  Complications: None reported at time of study completion    PROCEDURE:  CT of the Abdomen and Pelvis was performed.  Sagittal and coronal reformats were performed.    FINDINGS:  LOWER CHEST: Within normal limits.    LIVER: Enlarged (22.0 cm) homogeneous similar to prior..  BILE DUCTS: Normal caliber.  GALLBLADDER: Cholecystectomy.  SPLEEN: Within normal limits.  PANCREAS: Within normal limits.  ADRENALS: Within normallimits.  KIDNEYS/URETERS: Within normal limits.    BLADDER: Within normal limits.  REPRODUCTIVE ORGANS: Unremarkable    BOWEL: No bowel obstruction mild contiguous colonic wall thickening   consistent with pancolitis. Appendix normal  PERITONEUM: No ascites.  VESSELS: Within normal limits.  RETROPERITONEUM/LYMPH NODES: No lymphadenopathy.  ABDOMINAL WALL: Within normal limits.  BONES: Right femoral head bone islands.    IMPRESSION:  Acute uncomplicated pancolitis    --- End of Report ---    BENJAMIN LEE MD; Attending Radiologist  This document has been electronically signed. Dec 19 2023  3:16PM    < end of copied text >

## 2023-12-19 NOTE — ED ADULT NURSE REASSESSMENT NOTE - NSFALLRISKINTERV_ED_ALL_ED
Assistance OOB with selected safe patient handling equipment if applicable/Assistance with ambulation/Communicate fall risk and risk factors to all staff, patient, and family/Provide visual cue: yellow wristband, yellow gown, etc/Reinforce activity limits and safety measures with patient and family/Call bell, personal items and telephone in reach/Instruct patient to call for assistance before getting out of bed/chair/stretcher/Non-slip footwear applied when patient is off stretcher/[place] to call system/Physically safe environment - no spills, clutter or unnecessary equipment/Purposeful Proactive Rounding/Room/bathroom lighting operational, light cord in reach Assistance OOB with selected safe patient handling equipment if applicable/Assistance with ambulation/Communicate fall risk and risk factors to all staff, patient, and family/Provide visual cue: yellow wristband, yellow gown, etc/Reinforce activity limits and safety measures with patient and family/Call bell, personal items and telephone in reach/Instruct patient to call for assistance before getting out of bed/chair/stretcher/Non-slip footwear applied when patient is off stretcher/[place] to call system/Physically safe environment - no spills, clutter or unnecessary equipment/Purposeful Proactive Rounding/Room/bathroom lighting operational, light cord in reach

## 2023-12-19 NOTE — ED ADULT TRIAGE NOTE - PAIN RATING/NUMBER SCALE (0-10): ACTIVITY
"Subjective:     Patient ID: Aide Henry is a 35 y.o. female.    Chief Complaint: Left shoulder pain    History of Present Illness    Ms. Henry presents to clinic with a reported 2 year history of gradually worsening pain left shoulder. Denies known specific injury at shoulder in past. Increased pain noted with reaching back behind back, reaching out to side and reaching up over head. She is unable to dress self with left shoulder, unable to use left upper extremity with driving and all lifting activities. Does have history of lupus and Sjogerns. Rates pain at 7-8 out of 10 with use, sharp and stabbing in nature with use. Positive for constant aching sensation over lateral shoulder. Negative for numbness and tingling radiating down left upper extremity, denies pain radiating down left arm or into left side of neck. Denies all other concerns present at this time.      Social History     Occupational History   • Not on file.     Social History Main Topics   • Smoking status: Current Every Day Smoker     Packs/day: 0.50     Years: 20.00     Types: Cigarettes   • Smokeless tobacco: Never Used   • Alcohol use Yes      Comment: Alcoholic, Last drink 2018   • Drug use: Yes     Types: Cocaine      Comment: \"recovering addict\", last reports use 2017   • Sexual activity: Yes      Past Medical History:   Diagnosis Date   • Anxiety    • Bacterial vaginosis    • Fibromyalgia    • Fracture, finger    • Lateral epicondylitis 2013   • Lupus    • Sjogren's syndrome    • Urinary tract infection      Past Surgical History:   Procedure Laterality Date   • ADENOIDECTOMY     •  SECTION     • MOUTH SURGERY     • TONSILLECTOMY     • TUBAL ABDOMINAL LIGATION     • TYMPANOSTOMY TUBE PLACEMENT     • WISDOM TOOTH EXTRACTION Bilateral        Family History   Problem Relation Age of Onset   • Cancer Father    • Heart disease Paternal Grandmother    • Diabetes Maternal Aunt    • Diabetes Maternal Grandmother  " "        Review of Systems   Constitutional: Negative for chills, diaphoresis, fever and unexpected weight change.   HENT: Positive for hearing loss. Negative for nosebleeds, sore throat and tinnitus.    Eyes: Positive for pain. Negative for visual disturbance.   Respiratory: Negative for cough, shortness of breath and wheezing.    Cardiovascular: Negative for chest pain and palpitations.   Gastrointestinal: Positive for diarrhea and nausea. Negative for abdominal pain and vomiting.   Endocrine: Positive for cold intolerance and heat intolerance. Negative for polydipsia.   Genitourinary: Negative for difficulty urinating, dysuria and hematuria.   Musculoskeletal: Positive for myalgias. Negative for arthralgias and joint swelling.   Skin: Positive for rash. Negative for wound.   Allergic/Immunologic: Positive for environmental allergies.   Neurological: Positive for numbness. Negative for dizziness and syncope.   Hematological: Does not bruise/bleed easily.   Psychiatric/Behavioral: Positive for sleep disturbance. Negative for dysphoric mood. The patient is nervous/anxious.            Objective:  Physical Exam    Vital signs reviewed.   General: No acute distress.  Eyes: conjunctiva clear; pupils equally round and reactive  ENT: external ears and nose atraumatic; oropharynx clear  CV: no peripheral edema  Resp: normal respiratory effort  Skin: no rashes or wounds; normal turgor  Psych: mood and affect appropriate; recent and remote memory intact    Vitals:    06/29/18 1319   BP: 117/69   Pulse: 92   Weight: 72.6 kg (160 lb)   Height: 162.6 cm (64\")     1    06/29/18  1319   Weight: 72.6 kg (160 lb)     Body mass index is 27.46 kg/m².     Left Shoulder Exam     Tenderness   The patient is experiencing tenderness in the acromion.    Range of Motion   Forward Flexion: 180   External Rotation: 60   Internal Rotation 0 degrees: Lumbar     Tests   Apprehension: negative  Cross Arm: negative  Drop Arm: negative  Hawkin's " test: positive  Impingement: positive    Other   Erythema: absent  Scars: absent  Sensation: normal  Pulse: present     Comments:  Internal rotation: 4/5  External rotation: 4/5  Supraspinatus: 4-/5  Subscapularis: 4-/5  Biceps: 4-/5                  Imaging:  Reviewed x-rays previously completed [facility]:  Negative acute injury    Assessment:       1. Tendinopathy of left rotator cuff    2. Sjogren's syndrome, with unspecified organ involvement          Plan:  1. Discussed plan of care with patient. Will proceed with MRI left shoulder. Will see her back after completion of MRI to discuss results and further plan of care. Patient verbalized understanding of all information and agrees with plan of care. Denies all other concerns present at this time.     Orders:  Orders Placed This Encounter   Procedures   • MRI Shoulder Left Without Contrast       I ordered and reviewed the RAFAEL today.     Dictated utilizing Dragon dictation    8 (severe pain)

## 2023-12-19 NOTE — ED ADULT NURSE REASSESSMENT NOTE - NS ED NURSE REASSESS COMMENT FT1
Pt resting comfortably in bed at this time, offers no complaints.  Pt received morphine 2mg ivp and zofran ivp x 1 for pain and nausea with relief.  No chest pain or SOB.  Nausea and pain resolved at this time.  Pt ambulated to bathroom- steady gait noted.  Pt informed we need a sample of her stool.  Awaiting medical bed.  Maintain comfort and safety.

## 2023-12-19 NOTE — ED PROVIDER NOTE - NONTENDER LOCATION
left upper quadrant/right upper quadrant/suprapubic/left costovertebral angle/right costovertebral angle

## 2023-12-19 NOTE — ED ADULT NURSE NOTE - NSFALLUNIVINTERV_ED_ALL_ED
Bed/Stretcher in lowest position, wheels locked, appropriate side rails in place/Call bell, personal items and telephone in reach/Instruct patient to call for assistance before getting out of bed/chair/stretcher/Non-slip footwear applied when patient is off stretcher/[place] to call system/Physically safe environment - no spills, clutter or unnecessary equipment/Purposeful proactive rounding/Room/bathroom lighting operational, light cord in reach Bed/Stretcher in lowest position, wheels locked, appropriate side rails in place/Call bell, personal items and telephone in reach/Instruct patient to call for assistance before getting out of bed/chair/stretcher/Non-slip footwear applied when patient is off stretcher/[place] to call system/Physically safe environment - no spills, clutter or unnecessary equipment/Purposeful proactive rounding/Room/bathroom lighting operational, light cord in reach

## 2023-12-19 NOTE — ED ADULT TRIAGE NOTE - CHIEF COMPLAINT QUOTE
Pt states that she developed pain near umbilical area last night with N/V/D. Pt states she had umbilical hernia repair in 2021. Pt denies fever and chills.

## 2023-12-19 NOTE — CONSULT NOTE ADULT - ASSESSMENT
Pancolitis  Abdominal pain  N/V/D  History of IBS    CT noted, d/w pt  Ordered GI PCR  Check ASCA/ANCA and fecal calprotectin  Anti-emetics  Pain control  PPI  If symptoms do not improve, may need admission  If symptoms improve, can consider d/c on 5 day cipro/flagyl and close outpatient follow up with outpatient GI and colonoscopy in 4-6 weeks  Discussed all of the above with patient

## 2023-12-19 NOTE — ED PROVIDER NOTE - ATTENDING APP SHARED VISIT CONTRIBUTION OF CARE
Demarco Rice MD (Attending Physician):    I performed a history and physical exam of the patient and discussed their management with the SALENA. I have reviewed the SALENA note and agree with the documented findings and plan of care, except as noted. This was a shared visit with an SALENA. I reviewed and verified the documentation and independently performed my own history/exam/medical decision making. My medical decision making and observations are found below. Please refer to any progress notes for updates on clinical course.    HPI:  Patient is a 38-year-old female with past medical history of IBS, diabetic, depression, anxiety, borderline personality disorder, current recurrent UTIs presents with abdominal pain.  Patient reports she has had vomiting and diarrhea for the past week.  Patient reports last night she developed periumbilical abdominal pain described as sharp stabbing.  Patient did not take anything for symptoms. pt endorse associated urinary frequency. Patient reports she has chronic abdominal issues.  Patient had CAT scans in the past without acute findings.  Patient denies fever, chest pain, shortness of breath, dysuria, hematuria.    PE:  GEN - NAD, well appearing, A&Ox3  HEAD - NC/AT  EYES - PERRL, EOMI  ENT - Airway patent, +mucous membranes dry  PULMONARY - CTA b/l, symmetric breath sounds, no W/R/R  CARDIAC - +S1S2, RRR, no M/G/R, no JVD  ABDOMEN - +BS, ND, +diffusely TTP (worst in b/l lower abd), soft, no guarding, no rebound, no masses, no rigidity   - No CVA TTP b/l  EXTREMITIES - FROM, symmetric pulses, no edema  SKIN - No rash or bruising  NEUROLOGIC - Alert, speech clear, no focal deficits  PSYCH - Normal mood/affect, normal insight    MDM:  DDx includes, but not limited to: appendicitis, colitis, diverticulitis, pancreatitis, viral gastroenteritis, kidney stone, UTI. CT a/p, labs, urine, zofran, pepcid, pain control, IVF, possible GI consult. Dispo pending w/u.

## 2023-12-20 LAB
ALBUMIN SERPL ELPH-MCNC: 3.5 G/DL — SIGNIFICANT CHANGE UP (ref 3.3–5)
ALBUMIN SERPL ELPH-MCNC: 3.5 G/DL — SIGNIFICANT CHANGE UP (ref 3.3–5)
ALP SERPL-CCNC: 75 U/L — SIGNIFICANT CHANGE UP (ref 40–120)
ALP SERPL-CCNC: 75 U/L — SIGNIFICANT CHANGE UP (ref 40–120)
ALT FLD-CCNC: 306 U/L — HIGH (ref 12–78)
ALT FLD-CCNC: 306 U/L — HIGH (ref 12–78)
ANION GAP SERPL CALC-SCNC: 7 MMOL/L — SIGNIFICANT CHANGE UP (ref 5–17)
ANION GAP SERPL CALC-SCNC: 7 MMOL/L — SIGNIFICANT CHANGE UP (ref 5–17)
AST SERPL-CCNC: 266 U/L — HIGH (ref 15–37)
AST SERPL-CCNC: 266 U/L — HIGH (ref 15–37)
BAKER'S YEAST IGA QN IA: 6.2 UNITS — SIGNIFICANT CHANGE UP
BAKER'S YEAST IGA QN IA: 6.2 UNITS — SIGNIFICANT CHANGE UP
BAKER'S YEAST IGA QN IA: NEGATIVE — SIGNIFICANT CHANGE UP
BAKER'S YEAST IGA QN IA: NEGATIVE — SIGNIFICANT CHANGE UP
BAKER'S YEAST IGG QN IA: 5.3 UNITS — SIGNIFICANT CHANGE UP
BAKER'S YEAST IGG QN IA: 5.3 UNITS — SIGNIFICANT CHANGE UP
BAKER'S YEAST IGG QN IA: NEGATIVE — SIGNIFICANT CHANGE UP
BAKER'S YEAST IGG QN IA: NEGATIVE — SIGNIFICANT CHANGE UP
BILIRUB DIRECT SERPL-MCNC: 0.1 MG/DL — SIGNIFICANT CHANGE UP (ref 0–0.3)
BILIRUB DIRECT SERPL-MCNC: 0.1 MG/DL — SIGNIFICANT CHANGE UP (ref 0–0.3)
BILIRUB INDIRECT FLD-MCNC: 0.6 MG/DL — SIGNIFICANT CHANGE UP (ref 0.2–1)
BILIRUB INDIRECT FLD-MCNC: 0.6 MG/DL — SIGNIFICANT CHANGE UP (ref 0.2–1)
BILIRUB SERPL-MCNC: 0.7 MG/DL — SIGNIFICANT CHANGE UP (ref 0.2–1.2)
BILIRUB SERPL-MCNC: 0.7 MG/DL — SIGNIFICANT CHANGE UP (ref 0.2–1.2)
BUN SERPL-MCNC: 9 MG/DL — SIGNIFICANT CHANGE UP (ref 7–23)
BUN SERPL-MCNC: 9 MG/DL — SIGNIFICANT CHANGE UP (ref 7–23)
CALCIUM SERPL-MCNC: 8.3 MG/DL — LOW (ref 8.5–10.1)
CALCIUM SERPL-MCNC: 8.3 MG/DL — LOW (ref 8.5–10.1)
CHLORIDE SERPL-SCNC: 108 MMOL/L — SIGNIFICANT CHANGE UP (ref 96–108)
CHLORIDE SERPL-SCNC: 108 MMOL/L — SIGNIFICANT CHANGE UP (ref 96–108)
CO2 SERPL-SCNC: 24 MMOL/L — SIGNIFICANT CHANGE UP (ref 22–31)
CO2 SERPL-SCNC: 24 MMOL/L — SIGNIFICANT CHANGE UP (ref 22–31)
CREAT SERPL-MCNC: 0.89 MG/DL — SIGNIFICANT CHANGE UP (ref 0.5–1.3)
CREAT SERPL-MCNC: 0.89 MG/DL — SIGNIFICANT CHANGE UP (ref 0.5–1.3)
CRP SERPL-MCNC: <3 MG/L — SIGNIFICANT CHANGE UP
CRP SERPL-MCNC: <3 MG/L — SIGNIFICANT CHANGE UP
CULTURE RESULTS: SIGNIFICANT CHANGE UP
CULTURE RESULTS: SIGNIFICANT CHANGE UP
EGFR: 85 ML/MIN/1.73M2 — SIGNIFICANT CHANGE UP
EGFR: 85 ML/MIN/1.73M2 — SIGNIFICANT CHANGE UP
GLUCOSE BLDC GLUCOMTR-MCNC: 190 MG/DL — HIGH (ref 70–99)
GLUCOSE BLDC GLUCOMTR-MCNC: 190 MG/DL — HIGH (ref 70–99)
GLUCOSE SERPL-MCNC: 149 MG/DL — HIGH (ref 70–99)
GLUCOSE SERPL-MCNC: 149 MG/DL — HIGH (ref 70–99)
HCT VFR BLD CALC: 37.4 % — SIGNIFICANT CHANGE UP (ref 34.5–45)
HCT VFR BLD CALC: 37.4 % — SIGNIFICANT CHANGE UP (ref 34.5–45)
HGB BLD-MCNC: 13.8 G/DL — SIGNIFICANT CHANGE UP (ref 11.5–15.5)
HGB BLD-MCNC: 13.8 G/DL — SIGNIFICANT CHANGE UP (ref 11.5–15.5)
MCHC RBC-ENTMCNC: 32.3 PG — SIGNIFICANT CHANGE UP (ref 27–34)
MCHC RBC-ENTMCNC: 32.3 PG — SIGNIFICANT CHANGE UP (ref 27–34)
MCHC RBC-ENTMCNC: 36.9 GM/DL — HIGH (ref 32–36)
MCHC RBC-ENTMCNC: 36.9 GM/DL — HIGH (ref 32–36)
MCV RBC AUTO: 87.6 FL — SIGNIFICANT CHANGE UP (ref 80–100)
MCV RBC AUTO: 87.6 FL — SIGNIFICANT CHANGE UP (ref 80–100)
NRBC # BLD: 0 /100 WBCS — SIGNIFICANT CHANGE UP (ref 0–0)
NRBC # BLD: 0 /100 WBCS — SIGNIFICANT CHANGE UP (ref 0–0)
PLATELET # BLD AUTO: 277 K/UL — SIGNIFICANT CHANGE UP (ref 150–400)
PLATELET # BLD AUTO: 277 K/UL — SIGNIFICANT CHANGE UP (ref 150–400)
POTASSIUM SERPL-MCNC: 3.9 MMOL/L — SIGNIFICANT CHANGE UP (ref 3.5–5.3)
POTASSIUM SERPL-MCNC: 3.9 MMOL/L — SIGNIFICANT CHANGE UP (ref 3.5–5.3)
POTASSIUM SERPL-SCNC: 3.9 MMOL/L — SIGNIFICANT CHANGE UP (ref 3.5–5.3)
POTASSIUM SERPL-SCNC: 3.9 MMOL/L — SIGNIFICANT CHANGE UP (ref 3.5–5.3)
PROT SERPL-MCNC: 6.4 G/DL — SIGNIFICANT CHANGE UP (ref 6–8.3)
PROT SERPL-MCNC: 6.4 G/DL — SIGNIFICANT CHANGE UP (ref 6–8.3)
RBC # BLD: 4.27 M/UL — SIGNIFICANT CHANGE UP (ref 3.8–5.2)
RBC # BLD: 4.27 M/UL — SIGNIFICANT CHANGE UP (ref 3.8–5.2)
RBC # FLD: 12.2 % — SIGNIFICANT CHANGE UP (ref 10.3–14.5)
RBC # FLD: 12.2 % — SIGNIFICANT CHANGE UP (ref 10.3–14.5)
SODIUM SERPL-SCNC: 139 MMOL/L — SIGNIFICANT CHANGE UP (ref 135–145)
SODIUM SERPL-SCNC: 139 MMOL/L — SIGNIFICANT CHANGE UP (ref 135–145)
SPECIMEN SOURCE: SIGNIFICANT CHANGE UP
SPECIMEN SOURCE: SIGNIFICANT CHANGE UP
WBC # BLD: 6 K/UL — SIGNIFICANT CHANGE UP (ref 3.8–10.5)
WBC # BLD: 6 K/UL — SIGNIFICANT CHANGE UP (ref 3.8–10.5)
WBC # FLD AUTO: 6 K/UL — SIGNIFICANT CHANGE UP (ref 3.8–10.5)
WBC # FLD AUTO: 6 K/UL — SIGNIFICANT CHANGE UP (ref 3.8–10.5)

## 2023-12-20 RX ORDER — PANTOPRAZOLE SODIUM 20 MG/1
40 TABLET, DELAYED RELEASE ORAL DAILY
Refills: 0 | Status: DISCONTINUED | OUTPATIENT
Start: 2023-12-21 | End: 2023-12-25

## 2023-12-20 RX ORDER — MORPHINE SULFATE 50 MG/1
4 CAPSULE, EXTENDED RELEASE ORAL EVERY 4 HOURS
Refills: 0 | Status: DISCONTINUED | OUTPATIENT
Start: 2023-12-20 | End: 2023-12-21

## 2023-12-20 RX ORDER — MORPHINE SULFATE 50 MG/1
2 CAPSULE, EXTENDED RELEASE ORAL
Refills: 0 | Status: DISCONTINUED | OUTPATIENT
Start: 2023-12-20 | End: 2023-12-20

## 2023-12-20 RX ADMIN — MORPHINE SULFATE 2 MILLIGRAM(S): 50 CAPSULE, EXTENDED RELEASE ORAL at 10:58

## 2023-12-20 RX ADMIN — MORPHINE SULFATE 4 MILLIGRAM(S): 50 CAPSULE, EXTENDED RELEASE ORAL at 13:05

## 2023-12-20 RX ADMIN — PANTOPRAZOLE SODIUM 40 MILLIGRAM(S): 20 TABLET, DELAYED RELEASE ORAL at 05:21

## 2023-12-20 RX ADMIN — ONDANSETRON 4 MILLIGRAM(S): 8 TABLET, FILM COATED ORAL at 10:16

## 2023-12-20 RX ADMIN — Medication 200 MILLIGRAM(S): at 18:37

## 2023-12-20 RX ADMIN — ONDANSETRON 4 MILLIGRAM(S): 8 TABLET, FILM COATED ORAL at 04:43

## 2023-12-20 RX ADMIN — MORPHINE SULFATE 4 MILLIGRAM(S): 50 CAPSULE, EXTENDED RELEASE ORAL at 19:00

## 2023-12-20 RX ADMIN — Medication 200 MILLIGRAM(S): at 04:44

## 2023-12-20 RX ADMIN — Medication 100 MILLIGRAM(S): at 04:45

## 2023-12-20 RX ADMIN — MORPHINE SULFATE 4 MILLIGRAM(S): 50 CAPSULE, EXTENDED RELEASE ORAL at 22:37

## 2023-12-20 RX ADMIN — MORPHINE SULFATE 4 MILLIGRAM(S): 50 CAPSULE, EXTENDED RELEASE ORAL at 18:37

## 2023-12-20 RX ADMIN — LAMOTRIGINE 100 MILLIGRAM(S): 25 TABLET, ORALLY DISINTEGRATING ORAL at 18:36

## 2023-12-20 RX ADMIN — MORPHINE SULFATE 2 MILLIGRAM(S): 50 CAPSULE, EXTENDED RELEASE ORAL at 10:15

## 2023-12-20 RX ADMIN — Medication 650 MILLIGRAM(S): at 14:30

## 2023-12-20 RX ADMIN — MORPHINE SULFATE 4 MILLIGRAM(S): 50 CAPSULE, EXTENDED RELEASE ORAL at 13:30

## 2023-12-20 RX ADMIN — ONDANSETRON 4 MILLIGRAM(S): 8 TABLET, FILM COATED ORAL at 18:36

## 2023-12-20 RX ADMIN — Medication 100 MILLIGRAM(S): at 14:13

## 2023-12-20 RX ADMIN — Medication 100 MILLIGRAM(S): at 22:13

## 2023-12-20 RX ADMIN — Medication 650 MILLIGRAM(S): at 14:46

## 2023-12-20 NOTE — CHART NOTE - NSCHARTNOTEFT_GEN_A_CORE
Resident Rapid Response Note    Rapid response was called at for acute abdominal pain     Events leading up to Rapid Response:  Patient seen and examined at bedside. Patient laying on side and holding abdomen stating that she is in a lot of pain. She was admitting for pancolitis and ordered for morphine 2mg Q4H though states this is not helping. Otherwise patient alert and oriented + able to follow commands.     Patient was seen and examined at the bedside by the rapid response team. ICU PA at bedside.    Rapid Response Vital Signs:  BP:  HR:  RR:  SpO2: % on   Temp:  FS:      Physical Exam:  Gen: well appearing, NAD  HEENT: NCAT, PEERLA b/l, EOMI b/l  Cardio: regular rate and rhythm, +s1s2, no murmurs, rubs, or gallops  Pulm: CTA b/l, no wheezes, rales or rhonchi  Abdomen: epigastric tenderness with guarding   Extremities: no cyanosis or edema, +2 pedal pulses  Neuro: AAOx3, CNII-XII intact, 5/5 strength all extremities, sensation intact  Skin: warm and dry    Assessment/Plan:    38-year-old female with past medical history of IBS, diabetic, depression, anxiety, borderline personality disorder, current recurrent UTIs presents with abdominal pain. Admitted for pancolitits  - CT Abdomen done yesterday showed Acute uncomplicated pancolitis  - Increased pain regimen to morphine 4mg Q4H for severe pain   - Added pantoprazole IV   - GI following   - RN to call if any changes.  - Discussed with Dr. Austin, agrees with above plan  - Will continue to monitor Resident Rapid Response Note    Rapid response was called at for acute abdominal pain     Events leading up to Rapid Response:  Patient seen and examined at bedside. Patient laying on side and holding abdomen stating that she is in a lot of pain. She was admitting for pancolitis and ordered for morphine 2mg Q4H though states this is not helping. Otherwise patient alert and oriented + able to follow commands.     Patient was seen and examined at the bedside by the rapid response team. ICU PA at bedside.    Rapid Response Vital Signs:  BP: 132/82  HR:89  RR:18  SpO2: 98% on RA   Temp:98.2   FS:190      Physical Exam:  Gen: well appearing, NAD  HEENT: NCAT, PEERLA b/l, EOMI b/l  Cardio: regular rate and rhythm, +s1s2, no murmurs, rubs, or gallops  Pulm: CTA b/l, no wheezes, rales or rhonchi  Abdomen: epigastric tenderness with guarding   Extremities: no cyanosis or edema, +2 pedal pulses  Neuro: AAOx3, CNII-XII intact, 5/5 strength all extremities, sensation intact  Skin: warm and dry    Assessment/Plan:    38-year-old female with past medical history of IBS, diabetic, depression, anxiety, borderline personality disorder, current recurrent UTIs presents with abdominal pain. Admitted for pancolitits  - CT Abdomen done yesterday showed Acute uncomplicated pancolitis  - Increased pain regimen to morphine 4mg Q4H for severe pain   - Added pantoprazole IV   - GI following   - RN to call if any changes.  - Discussed with Dr. Austin, agrees with above plan  - Will continue to monitor Resident Rapid Response Note    Rapid response was called at for acute abdominal pain     Events leading up to Rapid Response:  Patient seen and examined at bedside. Patient laying on side and holding abdomen stating that she is in a lot of pain. She was admitting for pancolitis and ordered for morphine 2mg Q4H though states this is not helping. Otherwise patient alert and oriented + able to follow commands.     Patient was seen and examined at the bedside by the rapid response team. ICU PA at bedside.    Rapid Response Vital Signs:  BP: 132/82  HR:89  RR:18  SpO2: 98% on RA   Temp:98.2   FS:190      Physical Exam:  Gen: well appearing, NAD  HEENT: NCAT, PEERLA b/l, EOMI b/l  Cardio: regular rate and rhythm, +s1s2, no murmurs, rubs, or gallops  Pulm: CTA b/l, no wheezes, rales or rhonchi  Abdomen: epigastric tenderness with guarding   Extremities: no cyanosis or edema, +2 pedal pulses  Neuro: AAOx3, CNII-XII intact, 5/5 strength all extremities, sensation intact  Skin: warm and dry    Assessment/Plan:    38-year-old female with past medical history of IBS, diabetic, depression, anxiety, borderline personality disorder, current recurrent UTIs presents with abdominal pain. Admitted for pancolitits  - CT Abdomen done yesterday showed Acute uncomplicated pancolitis  - Increased pain regimen to morphine 4mg Q4H for severe pain   - Added pantoprazole IV   - GI following   - RN to call if any changes.  - Discussed with Dr. Austin, agrees with above plan  - Will continue to monitor      Follow up done 2 hrs after and patient laying in bed appears more comfortable with no acute complaints at this time.

## 2023-12-20 NOTE — PROGRESS NOTE ADULT - ASSESSMENT
Pancolitis  Abdominal pain  N/V/D  History of IBS    CT noted, d/w pt  Ordered GI PCR, monitor on abx  Check ASCA/ANCA and fecal calprotectin  Anti-emetics  Pain control  PPI  Clear liquids  Discussed all of the above with patient

## 2023-12-20 NOTE — PATIENT CHOICE NOTE. - NSPTCHOICESTATE_GEN_ALL_CORE
I have met with the patient and/or caregiver to discuss discharge goals and treatment plan. Patient and/or caregiver also provided with instructions on accessing the CMS Compare websites for additional information related to Post Acute Provider quality and resource use measures to assist them in evaluation of the providers and in selecting their post-acute provider of choice. Patient and caregiver were informed of the facilities that are owned and/or operated by [facility]. I have discussed with the patient the availability of in-network facilities and providers. Patient and caregiver provided with a list of post-acute providers whose services are appropriate to the discharge plans and patient needs.     For patient requiring durable medical equipment, patient and/or caregiver were informed that they have the right to request who provides the required equipment. I have met with the patient and/or caregiver to discuss discharge goals and treatment plan. Patient and/or caregiver also provided with instructions on accessing the CMS Compare websites for additional information related to Post Acute Provider quality and resource use measures to assist them in evaluation of the providers and in selecting their post-acute provider of choice. Patient and caregiver were informed of the facilities that are owned and/or operated by [facility]. I have discussed with the patient the availability of in-network facilities and providers. Patient and caregiver provided with a list of post-acute providers whose services are appropriate to the discharge plans and patient needs.     For patient requiring durable medical equipment, patient and/or caregiver were informed that they have the right to request who provides the required equipment.

## 2023-12-20 NOTE — PROGRESS NOTE ADULT - PROBLEM SELECTOR PLAN 1
pt with acute abd pain not improving and hx of ibs with mulitple colonscopies and egd in past  gi eval noted - recommend admit for further treatment given no improvement in er  iv cipro and flagyl  ivf  clear diet- was not able to toleate low residue diet  check stool studies   supportive care  iv ppi

## 2023-12-20 NOTE — CARE COORDINATION ASSESSMENT. - NSCAREPROVIDERS_GEN_ALL_CORE_FT
CARE PROVIDERS:  Accepting Physician: Cecy Austin  Administration: Brian Avalos  Administration: Chidi Zendejas  Admitting: Cecy Austin  Attending: Cecy Austin  Case Management: Yuliet Martin  Case Management: Kranthi Calzada  Case Management: Doreen Barrios  Consultant: Dianne Villegas  Consultant: Evans Choi  Covering Nurse: Monalisa Murray  Covering Team: Esequiel Alexis  ED ACP: Monica Ayon ED Attending: Demarco Rice  ED Nurse: Hanny Hernandez  Nurse: Sridevi Christianson  Nurse: John Beck  Nurse: Rene Garcia  Nurse: Melissa Jerome  Ordered: ADM, User  Outpatient Provider: Cecy Austin  Override: Sridevi Christianson  Override: Melissa Jerome  PCA/Nursing Assistant: Trina Shelby  PCA/Nursing Assistant: Jose Daniel Clifford  PCA/Nursing Assistant: Marilee Junior  PCA/Nursing Assistant: Anjelica Doll  Registered Dietitian: Hermila House// Supp. Assoc.: Renetta Singer

## 2023-12-20 NOTE — CARE COORDINATION ASSESSMENT. - PRO ARRIVE FROM
Per pt  lives with dad and was independent w ADL, ambulation, driving and med management prior to admission. Pt drives to appointments, has 0 stairs to enter home, 0 steps inside stated everything is on same level, denies community services, denies DME or need for usage. . CM verified: PCP: Dr. Rene Machado Pharmacy:  [facility]. : stated no. Pt stated family will  when medically safe for discharge./home Per pt  lives with dad and was independent w ADL, ambulation, driving and med management prior to admission. Pt drives to appointments, has 0 stairs to enter home, 0 steps inside stated everything is on same level, denies community services, denies DME or need for usage. . CM verified: PCP: Dr. Rene Machado Pharmacy:  [facility]. : stated no. Pt stated family will  when medically safe for discharge./home

## 2023-12-20 NOTE — CARE COORDINATION ASSESSMENT. - OTHER PERTINENT DISCHARGE PLANNING INFORMATION:
Met patient at bedside.  Explained role of CM, verbalized understanding. Pt was made aware a CM will remain available through hospitalization.  Contact information given in discharge/ transitions resource folder. Per pt  lives with dad and was independent w ADL, ambulation, driving and med management prior to admission. Pt drives to appointments, has 0 stairs to enter home, 0 steps inside stated everything is on same level, denies community services, denies DME or need for usage. . CM verified: PCP: Dr. Rene Machado Pharmacy:  [facility]. [place]: stated no. Pt stated family will  when medically safe for discharge.  Met patient at bedside.  Explained role of CM, verbalized understanding. Pt was made aware a CM will remain available through hospitalization.  Contact information given in discharge/ transitions resource folder. Per pt  lives with dad and was independent w ADL, ambulation, driving and med management prior to admission. Pt drives to appointments, has 0 stairs to enter home, 0 steps inside stated everything is on same level, denies community services, denies DME or need for usage. . CM verified: PCP: Dr. Rene Machado Pharmacy:  [facility]. [place]: stated no. Pt stated family will  when medically safe for discharge.

## 2023-12-20 NOTE — PROGRESS NOTE ADULT - SUBJECTIVE AND OBJECTIVE BOX
Date of Service: 12-20-23 @ 09:40    Patient is a 38y old  Female who presents with a chief complaint of abd pain (19 Dec 2023 20:43)       INTERVAL HPI/OVERNIGHT EVENTS: feels nausea, no diarrhea, recurrent abd pain    MEDICATIONS  (STANDING):  buPROPion XL (24-Hour) . 300 milliGRAM(s) Oral daily  ciprofloxacin   IVPB 400 milliGRAM(s) IV Intermittent every 12 hours  ciprofloxacin   IVPB      citalopram 40 milliGRAM(s) Oral daily  lamoTRIgine 100 milliGRAM(s) Oral two times a day  metroNIDAZOLE  IVPB 500 milliGRAM(s) IV Intermittent every 8 hours  metroNIDAZOLE  IVPB      pantoprazole    Tablet 40 milliGRAM(s) Oral before breakfast  sodium chloride 0.9%. 1000 milliLiter(s) (50 mL/Hr) IV Continuous <Continuous>    MEDICATIONS  (PRN):  acetaminophen     Tablet .. 650 milliGRAM(s) Oral every 6 hours PRN Temp greater or equal to 38C (100.4F), Mild Pain (1 - 3)  morphine  - Injectable 2 milliGRAM(s) IV Push every 4 hours PRN Severe Pain (7 - 10)  ondansetron Injectable 4 milliGRAM(s) IV Push every 6 hours PRN Nausea and/or Vomiting  oxycodone    5 mG/acetaminophen 325 mG 2 Tablet(s) Oral every 4 hours PRN Moderate Pain (4 - 6)  simethicone 80 milliGRAM(s) Chew every 6 hours PRN Upset Stomach      Allergies    doxycycline (Stomach Upset; Nausea)  cats (Unknown)    Intolerances        REVIEW OF SYSTEMS:  CONSTITUTIONAL: No fever, weight loss, or fatigue  EYES: No eye pain, visual disturbances  ENMT:  No difficulty hearing, tinnitus, vertigo; No sinus or throat pain  NECK: No pain or stiffness  RESPIRATORY: No cough, wheezing, chills or hemoptysis; No shortness of breath  CARDIOVASCULAR: No chest pain, palpitations, dizziness  GASTROINTESTINAL:abd pain with nausea  GENITOURINARY: No dysuria, frequency, hematuria, or incontinence  NEUROLOGICAL: No headaches, memory loss, loss of strength, numbness, or tremors  SKIN: No itching, burning  LYMPH NODES: No enlarged glands  MUSCULOSKELETAL: No joint pain or swelling; No muscle, back, or extremity pain  PSYCHIATRIC: No depression, mood swings  HEME/LYMPH: No easy bruising, or bleeding gums  ALLERGY AND IMMUNOLOGIC: No hives    Vital Signs Last 24 Hrs  T(C): 36.7 (20 Dec 2023 03:35), Max: 37.2 (19 Dec 2023 11:35)  T(F): 98.1 (20 Dec 2023 03:35), Max: 99 (19 Dec 2023 11:35)  HR: 83 (20 Dec 2023 03:35) (72 - 95)  BP: 125/84 (20 Dec 2023 03:35) (112/72 - 125/84)  BP(mean): --  RR: 17 (20 Dec 2023 03:35) (16 - 18)  SpO2: 99% (20 Dec 2023 03:35) (96% - 99%)    Parameters below as of 20 Dec 2023 03:35  Patient On (Oxygen Delivery Method): room air        PHYSICAL EXAM:  GENERAL: NAD, well-groomed, well-developed  HEAD:  Atraumatic, Normocephalic  EYES: EOMI, PERRLA, conjunctiva and sclera clear  ENMT: No tonsillar erythema, exudates, or enlargement   NECK: Supple, No JVD  NERVOUS SYSTEM:  Alert & Oriented X3, Good concentration  CHEST/LUNG: Clear to auscultation bilaterally; No rales, rhonchi, wheezing  HEART: Regular rate and rhythm  ABDOMEN: Soft, mild tender  EXTREMITIES:  2+ Peripheral Pulses   LYMPH: No lymphadenopathy noted  SKIN: No rashes     LABS:                        13.8   6.00  )-----------( 277      ( 20 Dec 2023 06:27 )             37.4     20 Dec 2023 06:27    139    |  108    |  9      ----------------------------<  149    3.9     |  24     |  0.89     Ca    8.3        20 Dec 2023 06:27    TPro  7.7    /  Alb  4.0    /  TBili  0.8    /  DBili  x      /  AST  19     /  ALT  33     /  AlkPhos  57     19 Dec 2023 12:35      Urinalysis Basic - ( 20 Dec 2023 06:27 )    Color: x / Appearance: x / SG: x / pH: x  Gluc: 149 mg/dL / Ketone: x  / Bili: x / Urobili: x   Blood: x / Protein: x / Nitrite: x   Leuk Esterase: x / RBC: x / WBC x   Sq Epi: x / Non Sq Epi: x / Bacteria: x      CAPILLARY BLOOD GLUCOSE                RADIOLOGY & ADDITIONAL TESTS:      Consultant(s) Notes Reviewed:  [ x] YES  [ ] NO    Care Discussed with Consultants/Other Providers [ ]x YES  [ ] NO    Advanced care planning discussed with patient and family, advanced care planning forms reviewed, discussed, and completed.  20 minutes spent.

## 2023-12-20 NOTE — PROGRESS NOTE ADULT - SUBJECTIVE AND OBJECTIVE BOX
[facility]  Kai Villegas PA-C  [address] [place] [address]  [phone]      INTERVAL HPI/OVERNIGHT EVENTS:  Pt s/e  Pt was feeling improved but pain returned after eating reg diet  No BM    MEDICATIONS  (STANDING):  buPROPion XL (24-Hour) . 300 milliGRAM(s) Oral daily  ciprofloxacin   IVPB 400 milliGRAM(s) IV Intermittent every 12 hours  ciprofloxacin   IVPB      citalopram 40 milliGRAM(s) Oral daily  lamoTRIgine 100 milliGRAM(s) Oral two times a day  metroNIDAZOLE  IVPB 500 milliGRAM(s) IV Intermittent every 8 hours  metroNIDAZOLE  IVPB      pantoprazole    Tablet 40 milliGRAM(s) Oral before breakfast  sodium chloride 0.9%. 1000 milliLiter(s) (50 mL/Hr) IV Continuous <Continuous>    MEDICATIONS  (PRN):  acetaminophen     Tablet .. 650 milliGRAM(s) Oral every 6 hours PRN Temp greater or equal to 38C (100.4F), Mild Pain (1 - 3)  morphine  - Injectable 2 milliGRAM(s) IV Push every 4 hours PRN Severe Pain (7 - 10)  ondansetron Injectable 4 milliGRAM(s) IV Push every 6 hours PRN Nausea and/or Vomiting  oxycodone    5 mG/acetaminophen 325 mG 2 Tablet(s) Oral every 4 hours PRN Moderate Pain (4 - 6)  simethicone 80 milliGRAM(s) Chew every 6 hours PRN Upset Stomach      Allergies    doxycycline (Stomach Upset; Nausea)  cats (Unknown)      PHYSICAL EXAM:   Vital Signs:  Vital Signs Last 24 Hrs  T(C): 36.7 (20 Dec 2023 03:35), Max: 37 (19 Dec 2023 13:34)  T(F): 98.1 (20 Dec 2023 03:35), Max: 98.6 (19 Dec 2023 13:34)  HR: 83 (20 Dec 2023 03:35) (72 - 86)  BP: 125/84 (20 Dec 2023 03:35) (112/72 - 125/84)  BP(mean): --  RR: 17 (20 Dec 2023 03:35) (16 - 18)  SpO2: 99% (20 Dec 2023 03:35) (96% - 99%)    Parameters below as of 20 Dec 2023 03:35  Patient On (Oxygen Delivery Method): room air    GENERAL:  Appears stated age  HEENT:  NC/AT  CHEST:  Full & symmetric excursion  HEART:  Regular rhythm  ABDOMEN:  Soft, non-tender, non-distended  EXTEREMITIES:  no cyanosis  SKIN:  No rash  NEURO:  Alert      LABS:                        13.8   6.00  )-----------( 277      ( 20 Dec 2023 06:27 )             37.4     12-20    139  |  108  |  9   ----------------------------<  149<H>  3.9   |  24  |  0.89    Ca    8.3<L>      20 Dec 2023 06:27    TPro  7.7  /  Alb  4.0  /  TBili  0.8  /  DBili  x   /  AST  19  /  ALT  33  /  AlkPhos  57  12-19      Urinalysis Basic - ( 20 Dec 2023 06:27 )    Color: x / Appearance: x / SG: x / pH: x  Gluc: 149 mg/dL / Ketone: x  / Bili: x / Urobili: x   Blood: x / Protein: x / Nitrite: x   Leuk Esterase: x / RBC: x / WBC x   Sq Epi: x / Non Sq Epi: x / Bacteria: x   [facility]  Kai Villegas PA-C  [address] [place] [address]  [phone]      INTERVAL HPI/OVERNIGHT EVENTS:  Pt s/e  Pt was feeling improved but pain returned after eating reg diet  No BM    MEDICATIONS  (STANDING):  buPROPion XL (24-Hour) . 300 milliGRAM(s) Oral daily  ciprofloxacin   IVPB 400 milliGRAM(s) IV Intermittent every 12 hours  ciprofloxacin   IVPB      citalopram 40 milliGRAM(s) Oral daily  lamoTRIgine 100 milliGRAM(s) Oral two times a day  metroNIDAZOLE  IVPB 500 milliGRAM(s) IV Intermittent every 8 hours  metroNIDAZOLE  IVPB      pantoprazole    Tablet 40 milliGRAM(s) Oral before breakfast  sodium chloride 0.9%. 1000 milliLiter(s) (50 mL/Hr) IV Continuous <Continuous>    MEDICATIONS  (PRN):  acetaminophen     Tablet .. 650 milliGRAM(s) Oral every 6 hours PRN Temp greater or equal to 38C (100.4F), Mild Pain (1 - 3)  morphine  - Injectable 2 milliGRAM(s) IV Push every 4 hours PRN Severe Pain (7 - 10)  ondansetron Injectable 4 milliGRAM(s) IV Push every 6 hours PRN Nausea and/or Vomiting  oxycodone    5 mG/acetaminophen 325 mG 2 Tablet(s) Oral every 4 hours PRN Moderate Pain (4 - 6)  simethicone 80 milliGRAM(s) Chew every 6 hours PRN Upset Stomach      Allergies    doxycycline (Stomach Upset; Nausea)  cats (Unknown)      PHYSICAL EXAM:   Vital Signs:  Vital Signs Last 24 Hrs  T(C): 36.7 (20 Dec 2023 03:35), Max: 37 (19 Dec 2023 13:34)  T(F): 98.1 (20 Dec 2023 03:35), Max: 98.6 (19 Dec 2023 13:34)  HR: 83 (20 Dec 2023 03:35) (72 - 86)  BP: 125/84 (20 Dec 2023 03:35) (112/72 - 125/84)  BP(mean): --  RR: 17 (20 Dec 2023 03:35) (16 - 18)  SpO2: 99% (20 Dec 2023 03:35) (96% - 99%)    Parameters below as of 20 Dec 2023 03:35  Patient On (Oxygen Delivery Method): room air    GENERAL:  Appears stated age  HEENT:  NC/AT  CHEST:  Full & symmetric excursion  HEART:  Regular rhythm  ABDOMEN:  Soft, non-tender, non-distended  EXTEREMITIES:  no cyanosis  SKIN:  No rash  NEURO:  Alert      LABS:                        13.8   6.00  )-----------( 277      ( 20 Dec 2023 06:27 )             37.4     12-20    139  |  108  |  9   ----------------------------<  149<H>  3.9   |  24  |  0.89    Ca    8.3<L>      20 Dec 2023 06:27    TPro  7.7  /  Alb  4.0  /  TBili  0.8  /  DBili  x   /  AST  19  /  ALT  33  /  AlkPhos  57  12-19      Urinalysis Basic - ( 20 Dec 2023 06:27 )    Color: x / Appearance: x / SG: x / pH: x  Gluc: 149 mg/dL / Ketone: x  / Bili: x / Urobili: x   Blood: x / Protein: x / Nitrite: x   Leuk Esterase: x / RBC: x / WBC x   Sq Epi: x / Non Sq Epi: x / Bacteria: x

## 2023-12-20 NOTE — CARE COORDINATION ASSESSMENT. - NSPASTMEDSURGHISTORY_GEN_ALL_CORE_FT
PAST MEDICAL & SURGICAL HISTORY:  Renal colic      Status post cholecystectomy      Depression      Anxiety      Borderline personality disorder      S/P tonsillectomy      IBS (irritable bowel syndrome)      Recurrent UTI

## 2023-12-21 LAB
ANION GAP SERPL CALC-SCNC: 5 MMOL/L — SIGNIFICANT CHANGE UP (ref 5–17)
ANION GAP SERPL CALC-SCNC: 5 MMOL/L — SIGNIFICANT CHANGE UP (ref 5–17)
AUTO DIFF PNL BLD: NEGATIVE — SIGNIFICANT CHANGE UP
AUTO DIFF PNL BLD: NEGATIVE — SIGNIFICANT CHANGE UP
BUN SERPL-MCNC: 5 MG/DL — LOW (ref 7–23)
BUN SERPL-MCNC: 5 MG/DL — LOW (ref 7–23)
C-ANCA SER-ACNC: NEGATIVE — SIGNIFICANT CHANGE UP
C-ANCA SER-ACNC: NEGATIVE — SIGNIFICANT CHANGE UP
CALCIUM SERPL-MCNC: 8.1 MG/DL — LOW (ref 8.5–10.1)
CALCIUM SERPL-MCNC: 8.1 MG/DL — LOW (ref 8.5–10.1)
CHLORIDE SERPL-SCNC: 112 MMOL/L — HIGH (ref 96–108)
CHLORIDE SERPL-SCNC: 112 MMOL/L — HIGH (ref 96–108)
CO2 SERPL-SCNC: 24 MMOL/L — SIGNIFICANT CHANGE UP (ref 22–31)
CO2 SERPL-SCNC: 24 MMOL/L — SIGNIFICANT CHANGE UP (ref 22–31)
CREAT SERPL-MCNC: 0.71 MG/DL — SIGNIFICANT CHANGE UP (ref 0.5–1.3)
CREAT SERPL-MCNC: 0.71 MG/DL — SIGNIFICANT CHANGE UP (ref 0.5–1.3)
EGFR: 112 ML/MIN/1.73M2 — SIGNIFICANT CHANGE UP
EGFR: 112 ML/MIN/1.73M2 — SIGNIFICANT CHANGE UP
GLUCOSE SERPL-MCNC: 166 MG/DL — HIGH (ref 70–99)
GLUCOSE SERPL-MCNC: 166 MG/DL — HIGH (ref 70–99)
HCT VFR BLD CALC: 34.7 % — SIGNIFICANT CHANGE UP (ref 34.5–45)
HCT VFR BLD CALC: 34.7 % — SIGNIFICANT CHANGE UP (ref 34.5–45)
HGB BLD-MCNC: 12.5 G/DL — SIGNIFICANT CHANGE UP (ref 11.5–15.5)
HGB BLD-MCNC: 12.5 G/DL — SIGNIFICANT CHANGE UP (ref 11.5–15.5)
MCHC RBC-ENTMCNC: 32.1 PG — SIGNIFICANT CHANGE UP (ref 27–34)
MCHC RBC-ENTMCNC: 32.1 PG — SIGNIFICANT CHANGE UP (ref 27–34)
MCHC RBC-ENTMCNC: 36 GM/DL — SIGNIFICANT CHANGE UP (ref 32–36)
MCHC RBC-ENTMCNC: 36 GM/DL — SIGNIFICANT CHANGE UP (ref 32–36)
MCV RBC AUTO: 89 FL — SIGNIFICANT CHANGE UP (ref 80–100)
MCV RBC AUTO: 89 FL — SIGNIFICANT CHANGE UP (ref 80–100)
NRBC # BLD: 0 /100 WBCS — SIGNIFICANT CHANGE UP (ref 0–0)
NRBC # BLD: 0 /100 WBCS — SIGNIFICANT CHANGE UP (ref 0–0)
P-ANCA SER-ACNC: NEGATIVE — SIGNIFICANT CHANGE UP
P-ANCA SER-ACNC: NEGATIVE — SIGNIFICANT CHANGE UP
PLATELET # BLD AUTO: 236 K/UL — SIGNIFICANT CHANGE UP (ref 150–400)
PLATELET # BLD AUTO: 236 K/UL — SIGNIFICANT CHANGE UP (ref 150–400)
POTASSIUM SERPL-MCNC: 3.8 MMOL/L — SIGNIFICANT CHANGE UP (ref 3.5–5.3)
POTASSIUM SERPL-MCNC: 3.8 MMOL/L — SIGNIFICANT CHANGE UP (ref 3.5–5.3)
POTASSIUM SERPL-SCNC: 3.8 MMOL/L — SIGNIFICANT CHANGE UP (ref 3.5–5.3)
POTASSIUM SERPL-SCNC: 3.8 MMOL/L — SIGNIFICANT CHANGE UP (ref 3.5–5.3)
RBC # BLD: 3.9 M/UL — SIGNIFICANT CHANGE UP (ref 3.8–5.2)
RBC # BLD: 3.9 M/UL — SIGNIFICANT CHANGE UP (ref 3.8–5.2)
RBC # FLD: 12.1 % — SIGNIFICANT CHANGE UP (ref 10.3–14.5)
RBC # FLD: 12.1 % — SIGNIFICANT CHANGE UP (ref 10.3–14.5)
SODIUM SERPL-SCNC: 141 MMOL/L — SIGNIFICANT CHANGE UP (ref 135–145)
SODIUM SERPL-SCNC: 141 MMOL/L — SIGNIFICANT CHANGE UP (ref 135–145)
WBC # BLD: 5.84 K/UL — SIGNIFICANT CHANGE UP (ref 3.8–10.5)
WBC # BLD: 5.84 K/UL — SIGNIFICANT CHANGE UP (ref 3.8–10.5)
WBC # FLD AUTO: 5.84 K/UL — SIGNIFICANT CHANGE UP (ref 3.8–10.5)
WBC # FLD AUTO: 5.84 K/UL — SIGNIFICANT CHANGE UP (ref 3.8–10.5)

## 2023-12-21 RX ORDER — LANOLIN ALCOHOL/MO/W.PET/CERES
5 CREAM (GRAM) TOPICAL AT BEDTIME
Refills: 0 | Status: DISCONTINUED | OUTPATIENT
Start: 2023-12-21 | End: 2023-12-25

## 2023-12-21 RX ORDER — MORPHINE SULFATE 50 MG/1
3 CAPSULE, EXTENDED RELEASE ORAL EVERY 4 HOURS
Refills: 0 | Status: DISCONTINUED | OUTPATIENT
Start: 2023-12-21 | End: 2023-12-23

## 2023-12-21 RX ORDER — METOCLOPRAMIDE HCL 10 MG
5 TABLET ORAL ONCE
Refills: 0 | Status: COMPLETED | OUTPATIENT
Start: 2023-12-21 | End: 2023-12-21

## 2023-12-21 RX ADMIN — LAMOTRIGINE 100 MILLIGRAM(S): 25 TABLET, ORALLY DISINTEGRATING ORAL at 17:16

## 2023-12-21 RX ADMIN — BUPROPION HYDROCHLORIDE 300 MILLIGRAM(S): 150 TABLET, EXTENDED RELEASE ORAL at 08:46

## 2023-12-21 RX ADMIN — Medication 200 MILLIGRAM(S): at 06:05

## 2023-12-21 RX ADMIN — MORPHINE SULFATE 4 MILLIGRAM(S): 50 CAPSULE, EXTENDED RELEASE ORAL at 09:00

## 2023-12-21 RX ADMIN — Medication 650 MILLIGRAM(S): at 05:09

## 2023-12-21 RX ADMIN — MORPHINE SULFATE 3 MILLIGRAM(S): 50 CAPSULE, EXTENDED RELEASE ORAL at 12:44

## 2023-12-21 RX ADMIN — MORPHINE SULFATE 3 MILLIGRAM(S): 50 CAPSULE, EXTENDED RELEASE ORAL at 17:50

## 2023-12-21 RX ADMIN — MORPHINE SULFATE 4 MILLIGRAM(S): 50 CAPSULE, EXTENDED RELEASE ORAL at 08:28

## 2023-12-21 RX ADMIN — Medication 5 MILLIGRAM(S): at 21:42

## 2023-12-21 RX ADMIN — Medication 5 MILLIGRAM(S): at 17:17

## 2023-12-21 RX ADMIN — Medication 100 MILLIGRAM(S): at 06:04

## 2023-12-21 RX ADMIN — LAMOTRIGINE 100 MILLIGRAM(S): 25 TABLET, ORALLY DISINTEGRATING ORAL at 08:23

## 2023-12-21 RX ADMIN — Medication 650 MILLIGRAM(S): at 05:39

## 2023-12-21 RX ADMIN — PANTOPRAZOLE SODIUM 40 MILLIGRAM(S): 20 TABLET, DELAYED RELEASE ORAL at 11:36

## 2023-12-21 RX ADMIN — MORPHINE SULFATE 3 MILLIGRAM(S): 50 CAPSULE, EXTENDED RELEASE ORAL at 21:42

## 2023-12-21 RX ADMIN — Medication 100 MILLIGRAM(S): at 21:43

## 2023-12-21 RX ADMIN — MORPHINE SULFATE 3 MILLIGRAM(S): 50 CAPSULE, EXTENDED RELEASE ORAL at 13:20

## 2023-12-21 RX ADMIN — Medication 100 MILLIGRAM(S): at 14:03

## 2023-12-21 RX ADMIN — MORPHINE SULFATE 3 MILLIGRAM(S): 50 CAPSULE, EXTENDED RELEASE ORAL at 22:12

## 2023-12-21 RX ADMIN — Medication 650 MILLIGRAM(S): at 22:32

## 2023-12-21 RX ADMIN — CITALOPRAM 40 MILLIGRAM(S): 10 TABLET, FILM COATED ORAL at 08:46

## 2023-12-21 RX ADMIN — Medication 200 MILLIGRAM(S): at 17:16

## 2023-12-21 RX ADMIN — MORPHINE SULFATE 3 MILLIGRAM(S): 50 CAPSULE, EXTENDED RELEASE ORAL at 17:15

## 2023-12-21 RX ADMIN — Medication 5 MILLIGRAM(S): at 01:46

## 2023-12-21 RX ADMIN — Medication 650 MILLIGRAM(S): at 23:02

## 2023-12-21 NOTE — PROGRESS NOTE ADULT - ASSESSMENT
Pancolitis  Abdominal pain  N/V/D  History of IBS    CT noted, d/w pt  Ordered GI PCR, monitor on abx  Follow fecal calprotectin  ASCA and ANCA neg  Anti-emetics  Pain control  PPI  Advance diet as tolerated  Discussed all of the above with patient and father at bedside

## 2023-12-21 NOTE — PROGRESS NOTE ADULT - SUBJECTIVE AND OBJECTIVE BOX
Date of Service: 12-21-23 @ 10:12    Patient is a 38y old  Female who presents with a chief complaint of abd pain (20 Dec 2023 11:53)       INTERVAL HPI/OVERNIGHT EVENTS: pt cojmfortable, although still c/o of abd pain    MEDICATIONS  (STANDING):  buPROPion XL (24-Hour) . 300 milliGRAM(s) Oral daily  ciprofloxacin   IVPB 400 milliGRAM(s) IV Intermittent every 12 hours  ciprofloxacin   IVPB      citalopram 40 milliGRAM(s) Oral daily  lamoTRIgine 100 milliGRAM(s) Oral two times a day  melatonin 5 milliGRAM(s) Oral at bedtime  metroNIDAZOLE  IVPB 500 milliGRAM(s) IV Intermittent every 8 hours  metroNIDAZOLE  IVPB      pantoprazole  Injectable 40 milliGRAM(s) IV Push daily    MEDICATIONS  (PRN):  acetaminophen     Tablet .. 650 milliGRAM(s) Oral every 6 hours PRN Temp greater or equal to 38C (100.4F), Mild Pain (1 - 3)  morphine  - Injectable 3 milliGRAM(s) IV Push every 4 hours PRN Severe Pain (7 - 10)  ondansetron Injectable 4 milliGRAM(s) IV Push every 6 hours PRN Nausea and/or Vomiting  oxycodone    5 mG/acetaminophen 325 mG 2 Tablet(s) Oral every 4 hours PRN Moderate Pain (4 - 6)  simethicone 80 milliGRAM(s) Chew every 6 hours PRN Upset Stomach      Allergies    doxycycline (Stomach Upset; Nausea)  cats (Unknown)    Intolerances        REVIEW OF SYSTEMS:  CONSTITUTIONAL: No fever, weight loss, or fatigue  EYES: No eye pain, visual disturbances  ENMT:  No difficulty hearing, tinnitus, vertigo; No sinus or throat pain  NECK: No pain or stiffness  RESPIRATORY: No cough, wheezing, chills or hemoptysis; No shortness of breath  CARDIOVASCULAR: No chest pain, palpitations, dizziness  GASTROINTESTINAL:abd pain  GENITOURINARY: No dysuria, frequency, hematuria, or incontinence  NEUROLOGICAL: No headaches, memory loss, loss of strength, numbness, or tremors  SKIN: No itching, burning  LYMPH NODES: No enlarged glands  MUSCULOSKELETAL: No joint pain or swelling; No muscle, back, or extremity pain  PSYCHIATRIC: No depression, mood swings  HEME/LYMPH: No easy bruising, or bleeding gums  ALLERGY AND IMMUNOLOGIC: No hives    Vital Signs Last 24 Hrs  T(C): 36.8 (21 Dec 2023 04:40), Max: 37.5 (20 Dec 2023 12:02)  T(F): 98.3 (21 Dec 2023 04:40), Max: 99.5 (20 Dec 2023 12:02)  HR: 84 (21 Dec 2023 04:40) (80 - 85)  BP: 104/70 (21 Dec 2023 04:40) (104/70 - 113/78)  BP(mean): --  RR: 18 (21 Dec 2023 04:40) (18 - 18)  SpO2: 94% (21 Dec 2023 04:40) (94% - 95%)    Parameters below as of 21 Dec 2023 04:40  Patient On (Oxygen Delivery Method): room air        PHYSICAL EXAM:  GENERAL: NAD, well-groomed, well-developed  HEAD:  Atraumatic, Normocephalic  EYES: EOMI, PERRLA, conjunctiva and sclera clear  ENMT: No tonsillar erythema, exudates, or enlargement   NECK: Supple, No JVD  NERVOUS SYSTEM:  Alert & Oriented X3, Good concentration  CHEST/LUNG: Clear to auscultation bilaterally; No rales, rhonchi, wheezing  HEART: Regular rate and rhythm  ABDOMEN: Soft,nd and non tender on palpation  EXTREMITIES:  2+ Peripheral Pulses   LYMPH: No lymphadenopathy noted  SKIN: No rashes     LABS:                        12.5   5.84  )-----------( 236      ( 21 Dec 2023 07:10 )             34.7     21 Dec 2023 07:10    141    |  112    |  5      ----------------------------<  166    3.8     |  24     |  0.71     Ca    8.1        21 Dec 2023 07:10        Urinalysis Basic - ( 21 Dec 2023 07:10 )    Color: x / Appearance: x / SG: x / pH: x  Gluc: 166 mg/dL / Ketone: x  / Bili: x / Urobili: x   Blood: x / Protein: x / Nitrite: x   Leuk Esterase: x / RBC: x / WBC x   Sq Epi: x / Non Sq Epi: x / Bacteria: x      CAPILLARY BLOOD GLUCOSE      POCT Blood Glucose.: 190 mg/dL (20 Dec 2023 12:32)    blood culture --   <10,000 CFU/mL Normal Urogenital Batsheva   12-19 @ 12:35      urine culture --  12-19 @ 12:35  results   <10,000 CFU/mL Normal Urogenital Batsheva 12-19 @ 12:35    wound with gram statin --    12-19 @ 12:35  organism  --   12-19 @ 12:35  specimen source Clean Catch Clean Catch (Midstream)  12-19 @ 12:35      RADIOLOGY & ADDITIONAL TESTS:      Consultant(s) Notes Reviewed:  [x ] YES  [ ] NO    Care Discussed with Consultants/Other Providers [x ] YES  [ ] NO    Advanced care planning discussed with patient and family, advanced care planning forms reviewed, discussed, and completed.  20 minutes spent.

## 2023-12-21 NOTE — PROGRESS NOTE ADULT - PROBLEM SELECTOR PLAN 1
pt with acute abd pain not improving and hx of ibs with mulitple colonscopies and egd in past  iv cipro and flagyl  trial of full liquid diet  check stool studies - pending  supportive care  ppi  requiring pain meds - morphine - titrate down

## 2023-12-21 NOTE — PROGRESS NOTE ADULT - SUBJECTIVE AND OBJECTIVE BOX
[facility]  Kai Villegas PA-C  [address] [place] [address]  [phone]      INTERVAL HPI/OVERNIGHT EVENTS:  Pt s/e  +loose BMs overnight  Continued abdominal pain and nausea    MEDICATIONS  (STANDING):  buPROPion XL (24-Hour) . 300 milliGRAM(s) Oral daily  ciprofloxacin   IVPB 400 milliGRAM(s) IV Intermittent every 12 hours  ciprofloxacin   IVPB      citalopram 40 milliGRAM(s) Oral daily  lamoTRIgine 100 milliGRAM(s) Oral two times a day  melatonin 5 milliGRAM(s) Oral at bedtime  metoclopramide Injectable 5 milliGRAM(s) IV Push once  metroNIDAZOLE  IVPB      metroNIDAZOLE  IVPB 500 milliGRAM(s) IV Intermittent every 8 hours  pantoprazole  Injectable 40 milliGRAM(s) IV Push daily    MEDICATIONS  (PRN):  acetaminophen     Tablet .. 650 milliGRAM(s) Oral every 6 hours PRN Temp greater or equal to 38C (100.4F), Mild Pain (1 - 3)  morphine  - Injectable 3 milliGRAM(s) IV Push every 4 hours PRN Severe Pain (7 - 10)  ondansetron Injectable 4 milliGRAM(s) IV Push every 6 hours PRN Nausea and/or Vomiting  oxycodone    5 mG/acetaminophen 325 mG 2 Tablet(s) Oral every 4 hours PRN Moderate Pain (4 - 6)  simethicone 80 milliGRAM(s) Chew every 6 hours PRN Upset Stomach      Allergies    doxycycline (Stomach Upset; Nausea)  cats (Unknown)      PHYSICAL EXAM:   Vital Signs:  Vital Signs Last 24 Hrs  T(C): 36.7 (21 Dec 2023 11:23), Max: 36.9 (20 Dec 2023 19:58)  T(F): 98 (21 Dec 2023 11:23), Max: 98.4 (20 Dec 2023 19:58)  HR: 82 (21 Dec 2023 11:23) (80 - 84)  BP: 100/70 (21 Dec 2023 11:23) (100/70 - 105/72)  BP(mean): --  RR: 18 (21 Dec 2023 11:23) (18 - 18)  SpO2: 97% (21 Dec 2023 11:23) (94% - 97%)    Parameters below as of 21 Dec 2023 11:23  Patient On (Oxygen Delivery Method): room air      GENERAL:  Appears stated age  HEENT:  NC/AT  CHEST:  Full & symmetric excursion  HEART:  Regular rhythm  ABDOMEN:  Soft, +TTP, non-distended  EXTEREMITIES:  no cyanosis  SKIN:  No rash  NEURO:  Alert      LABS:                        12.5   5.84  )-----------( 236      ( 21 Dec 2023 07:10 )             34.7     12-21    141  |  112<H>  |  5<L>  ----------------------------<  166<H>  3.8   |  24  |  0.71    Ca    8.1<L>      21 Dec 2023 07:10    TPro  6.4  /  Alb  3.5  /  TBili  0.7  /  DBili  0.1  /  AST  266<H>  /  ALT  306<H>  /  AlkPhos  75  12-20      Urinalysis Basic - ( 21 Dec 2023 07:10 )    Color: x / Appearance: x / SG: x / pH: x  Gluc: 166 mg/dL / Ketone: x  / Bili: x / Urobili: x   Blood: x / Protein: x / Nitrite: x   Leuk Esterase: x / RBC: x / WBC x   Sq Epi: x / Non Sq Epi: x / Bacteria: x     [facility]  Kai Villegas PA-C  [address] [place] [address]  [phone]      INTERVAL HPI/OVERNIGHT EVENTS:  Pt s/e  +loose BMs overnight  Continued abdominal pain and nausea    MEDICATIONS  (STANDING):  buPROPion XL (24-Hour) . 300 milliGRAM(s) Oral daily  ciprofloxacin   IVPB 400 milliGRAM(s) IV Intermittent every 12 hours  ciprofloxacin   IVPB      citalopram 40 milliGRAM(s) Oral daily  lamoTRIgine 100 milliGRAM(s) Oral two times a day  melatonin 5 milliGRAM(s) Oral at bedtime  metoclopramide Injectable 5 milliGRAM(s) IV Push once  metroNIDAZOLE  IVPB      metroNIDAZOLE  IVPB 500 milliGRAM(s) IV Intermittent every 8 hours  pantoprazole  Injectable 40 milliGRAM(s) IV Push daily    MEDICATIONS  (PRN):  acetaminophen     Tablet .. 650 milliGRAM(s) Oral every 6 hours PRN Temp greater or equal to 38C (100.4F), Mild Pain (1 - 3)  morphine  - Injectable 3 milliGRAM(s) IV Push every 4 hours PRN Severe Pain (7 - 10)  ondansetron Injectable 4 milliGRAM(s) IV Push every 6 hours PRN Nausea and/or Vomiting  oxycodone    5 mG/acetaminophen 325 mG 2 Tablet(s) Oral every 4 hours PRN Moderate Pain (4 - 6)  simethicone 80 milliGRAM(s) Chew every 6 hours PRN Upset Stomach      Allergies    doxycycline (Stomach Upset; Nausea)  cats (Unknown)      PHYSICAL EXAM:   Vital Signs:  Vital Signs Last 24 Hrs  T(C): 36.7 (21 Dec 2023 11:23), Max: 36.9 (20 Dec 2023 19:58)  T(F): 98 (21 Dec 2023 11:23), Max: 98.4 (20 Dec 2023 19:58)  HR: 82 (21 Dec 2023 11:23) (80 - 84)  BP: 100/70 (21 Dec 2023 11:23) (100/70 - 105/72)  BP(mean): --  RR: 18 (21 Dec 2023 11:23) (18 - 18)  SpO2: 97% (21 Dec 2023 11:23) (94% - 97%)    Parameters below as of 21 Dec 2023 11:23  Patient On (Oxygen Delivery Method): room air      GENERAL:  Appears stated age  HEENT:  NC/AT  CHEST:  Full & symmetric excursion  HEART:  Regular rhythm  ABDOMEN:  Soft, +TTP, non-distended  EXTEREMITIES:  no cyanosis  SKIN:  No rash  NEURO:  Alert      LABS:                        12.5   5.84  )-----------( 236      ( 21 Dec 2023 07:10 )             34.7     12-21    141  |  112<H>  |  5<L>  ----------------------------<  166<H>  3.8   |  24  |  0.71    Ca    8.1<L>      21 Dec 2023 07:10    TPro  6.4  /  Alb  3.5  /  TBili  0.7  /  DBili  0.1  /  AST  266<H>  /  ALT  306<H>  /  AlkPhos  75  12-20      Urinalysis Basic - ( 21 Dec 2023 07:10 )    Color: x / Appearance: x / SG: x / pH: x  Gluc: 166 mg/dL / Ketone: x  / Bili: x / Urobili: x   Blood: x / Protein: x / Nitrite: x   Leuk Esterase: x / RBC: x / WBC x   Sq Epi: x / Non Sq Epi: x / Bacteria: x

## 2023-12-22 DIAGNOSIS — Z29.9 ENCOUNTER FOR PROPHYLACTIC MEASURES, UNSPECIFIED: ICD-10-CM

## 2023-12-22 DIAGNOSIS — E11.9 TYPE 2 DIABETES MELLITUS WITHOUT COMPLICATIONS: ICD-10-CM

## 2023-12-22 DIAGNOSIS — R74.01 ELEVATION OF LEVELS OF LIVER TRANSAMINASE LEVELS: ICD-10-CM

## 2023-12-22 DIAGNOSIS — J30.2 OTHER SEASONAL ALLERGIC RHINITIS: ICD-10-CM

## 2023-12-22 LAB
ALBUMIN SERPL ELPH-MCNC: 3.7 G/DL — SIGNIFICANT CHANGE UP (ref 3.3–5)
ALBUMIN SERPL ELPH-MCNC: 3.7 G/DL — SIGNIFICANT CHANGE UP (ref 3.3–5)
ALP SERPL-CCNC: 74 U/L — SIGNIFICANT CHANGE UP (ref 40–120)
ALP SERPL-CCNC: 74 U/L — SIGNIFICANT CHANGE UP (ref 40–120)
ALT FLD-CCNC: 126 U/L — HIGH (ref 12–78)
ALT FLD-CCNC: 126 U/L — HIGH (ref 12–78)
ANION GAP SERPL CALC-SCNC: 6 MMOL/L — SIGNIFICANT CHANGE UP (ref 5–17)
ANION GAP SERPL CALC-SCNC: 6 MMOL/L — SIGNIFICANT CHANGE UP (ref 5–17)
AST SERPL-CCNC: 30 U/L — SIGNIFICANT CHANGE UP (ref 15–37)
AST SERPL-CCNC: 30 U/L — SIGNIFICANT CHANGE UP (ref 15–37)
BILIRUB SERPL-MCNC: 0.6 MG/DL — SIGNIFICANT CHANGE UP (ref 0.2–1.2)
BILIRUB SERPL-MCNC: 0.6 MG/DL — SIGNIFICANT CHANGE UP (ref 0.2–1.2)
BUN SERPL-MCNC: 5 MG/DL — LOW (ref 7–23)
BUN SERPL-MCNC: 5 MG/DL — LOW (ref 7–23)
CALCIUM SERPL-MCNC: 8.9 MG/DL — SIGNIFICANT CHANGE UP (ref 8.5–10.1)
CALCIUM SERPL-MCNC: 8.9 MG/DL — SIGNIFICANT CHANGE UP (ref 8.5–10.1)
CHLORIDE SERPL-SCNC: 109 MMOL/L — HIGH (ref 96–108)
CHLORIDE SERPL-SCNC: 109 MMOL/L — HIGH (ref 96–108)
CO2 SERPL-SCNC: 25 MMOL/L — SIGNIFICANT CHANGE UP (ref 22–31)
CO2 SERPL-SCNC: 25 MMOL/L — SIGNIFICANT CHANGE UP (ref 22–31)
CREAT SERPL-MCNC: 0.83 MG/DL — SIGNIFICANT CHANGE UP (ref 0.5–1.3)
CREAT SERPL-MCNC: 0.83 MG/DL — SIGNIFICANT CHANGE UP (ref 0.5–1.3)
EGFR: 92 ML/MIN/1.73M2 — SIGNIFICANT CHANGE UP
EGFR: 92 ML/MIN/1.73M2 — SIGNIFICANT CHANGE UP
GLUCOSE SERPL-MCNC: 166 MG/DL — HIGH (ref 70–99)
GLUCOSE SERPL-MCNC: 166 MG/DL — HIGH (ref 70–99)
HCT VFR BLD CALC: 38 % — SIGNIFICANT CHANGE UP (ref 34.5–45)
HCT VFR BLD CALC: 38 % — SIGNIFICANT CHANGE UP (ref 34.5–45)
HGB BLD-MCNC: 13.8 G/DL — SIGNIFICANT CHANGE UP (ref 11.5–15.5)
HGB BLD-MCNC: 13.8 G/DL — SIGNIFICANT CHANGE UP (ref 11.5–15.5)
MCHC RBC-ENTMCNC: 31.9 PG — SIGNIFICANT CHANGE UP (ref 27–34)
MCHC RBC-ENTMCNC: 31.9 PG — SIGNIFICANT CHANGE UP (ref 27–34)
MCHC RBC-ENTMCNC: 36.3 GM/DL — HIGH (ref 32–36)
MCHC RBC-ENTMCNC: 36.3 GM/DL — HIGH (ref 32–36)
MCV RBC AUTO: 87.8 FL — SIGNIFICANT CHANGE UP (ref 80–100)
MCV RBC AUTO: 87.8 FL — SIGNIFICANT CHANGE UP (ref 80–100)
NRBC # BLD: 0 /100 WBCS — SIGNIFICANT CHANGE UP (ref 0–0)
NRBC # BLD: 0 /100 WBCS — SIGNIFICANT CHANGE UP (ref 0–0)
PLATELET # BLD AUTO: 282 K/UL — SIGNIFICANT CHANGE UP (ref 150–400)
PLATELET # BLD AUTO: 282 K/UL — SIGNIFICANT CHANGE UP (ref 150–400)
POTASSIUM SERPL-MCNC: 3.4 MMOL/L — LOW (ref 3.5–5.3)
POTASSIUM SERPL-MCNC: 3.4 MMOL/L — LOW (ref 3.5–5.3)
POTASSIUM SERPL-SCNC: 3.4 MMOL/L — LOW (ref 3.5–5.3)
POTASSIUM SERPL-SCNC: 3.4 MMOL/L — LOW (ref 3.5–5.3)
PROT SERPL-MCNC: 7.1 G/DL — SIGNIFICANT CHANGE UP (ref 6–8.3)
PROT SERPL-MCNC: 7.1 G/DL — SIGNIFICANT CHANGE UP (ref 6–8.3)
RBC # BLD: 4.33 M/UL — SIGNIFICANT CHANGE UP (ref 3.8–5.2)
RBC # BLD: 4.33 M/UL — SIGNIFICANT CHANGE UP (ref 3.8–5.2)
RBC # FLD: 11.9 % — SIGNIFICANT CHANGE UP (ref 10.3–14.5)
RBC # FLD: 11.9 % — SIGNIFICANT CHANGE UP (ref 10.3–14.5)
SODIUM SERPL-SCNC: 140 MMOL/L — SIGNIFICANT CHANGE UP (ref 135–145)
SODIUM SERPL-SCNC: 140 MMOL/L — SIGNIFICANT CHANGE UP (ref 135–145)
WBC # BLD: 6.54 K/UL — SIGNIFICANT CHANGE UP (ref 3.8–10.5)
WBC # BLD: 6.54 K/UL — SIGNIFICANT CHANGE UP (ref 3.8–10.5)
WBC # FLD AUTO: 6.54 K/UL — SIGNIFICANT CHANGE UP (ref 3.8–10.5)
WBC # FLD AUTO: 6.54 K/UL — SIGNIFICANT CHANGE UP (ref 3.8–10.5)

## 2023-12-22 PROCEDURE — 74177 CT ABD & PELVIS W/CONTRAST: CPT | Mod: 26

## 2023-12-22 PROCEDURE — 99233 SBSQ HOSP IP/OBS HIGH 50: CPT

## 2023-12-22 RX ORDER — INSULIN LISPRO 100/ML
VIAL (ML) SUBCUTANEOUS
Refills: 0 | Status: DISCONTINUED | OUTPATIENT
Start: 2023-12-22 | End: 2023-12-25

## 2023-12-22 RX ORDER — DEXTROSE 50 % IN WATER 50 %
25 SYRINGE (ML) INTRAVENOUS ONCE
Refills: 0 | Status: DISCONTINUED | OUTPATIENT
Start: 2023-12-22 | End: 2023-12-25

## 2023-12-22 RX ORDER — SODIUM CHLORIDE 9 MG/ML
1000 INJECTION, SOLUTION INTRAVENOUS
Refills: 0 | Status: DISCONTINUED | OUTPATIENT
Start: 2023-12-22 | End: 2023-12-25

## 2023-12-22 RX ORDER — GLUCAGON INJECTION, SOLUTION 0.5 MG/.1ML
1 INJECTION, SOLUTION SUBCUTANEOUS ONCE
Refills: 0 | Status: DISCONTINUED | OUTPATIENT
Start: 2023-12-22 | End: 2023-12-25

## 2023-12-22 RX ORDER — DEXTROSE 50 % IN WATER 50 %
12.5 SYRINGE (ML) INTRAVENOUS ONCE
Refills: 0 | Status: DISCONTINUED | OUTPATIENT
Start: 2023-12-22 | End: 2023-12-25

## 2023-12-22 RX ORDER — MORPHINE SULFATE 50 MG/1
3 CAPSULE, EXTENDED RELEASE ORAL ONCE
Refills: 0 | Status: DISCONTINUED | OUTPATIENT
Start: 2023-12-22 | End: 2023-12-22

## 2023-12-22 RX ORDER — LORATADINE 10 MG/1
10 TABLET ORAL DAILY
Refills: 0 | Status: DISCONTINUED | OUTPATIENT
Start: 2023-12-22 | End: 2023-12-25

## 2023-12-22 RX ORDER — DEXTROSE MONOHYDRATE, SODIUM CHLORIDE, AND POTASSIUM CHLORIDE 50; .745; 4.5 G/1000ML; G/1000ML; G/1000ML
1000 INJECTION, SOLUTION INTRAVENOUS
Refills: 0 | Status: DISCONTINUED | OUTPATIENT
Start: 2023-12-22 | End: 2023-12-24

## 2023-12-22 RX ORDER — CITALOPRAM 10 MG/1
80 TABLET, FILM COATED ORAL DAILY
Refills: 0 | Status: DISCONTINUED | OUTPATIENT
Start: 2023-12-22 | End: 2023-12-25

## 2023-12-22 RX ORDER — DEXTROSE 50 % IN WATER 50 %
15 SYRINGE (ML) INTRAVENOUS ONCE
Refills: 0 | Status: DISCONTINUED | OUTPATIENT
Start: 2023-12-22 | End: 2023-12-25

## 2023-12-22 RX ORDER — METOCLOPRAMIDE HCL 10 MG
5 TABLET ORAL EVERY 8 HOURS
Refills: 0 | Status: DISCONTINUED | OUTPATIENT
Start: 2023-12-22 | End: 2023-12-23

## 2023-12-22 RX ADMIN — Medication 100 MILLIGRAM(S): at 08:54

## 2023-12-22 RX ADMIN — Medication 5 MILLIGRAM(S): at 14:17

## 2023-12-22 RX ADMIN — MORPHINE SULFATE 3 MILLIGRAM(S): 50 CAPSULE, EXTENDED RELEASE ORAL at 12:17

## 2023-12-22 RX ADMIN — MORPHINE SULFATE 3 MILLIGRAM(S): 50 CAPSULE, EXTENDED RELEASE ORAL at 06:45

## 2023-12-22 RX ADMIN — DEXTROSE MONOHYDRATE, SODIUM CHLORIDE, AND POTASSIUM CHLORIDE 50 MILLILITER(S): 50; .745; 4.5 INJECTION, SOLUTION INTRAVENOUS at 17:23

## 2023-12-22 RX ADMIN — SIMETHICONE 80 MILLIGRAM(S): 80 TABLET, CHEWABLE ORAL at 16:20

## 2023-12-22 RX ADMIN — BUPROPION HYDROCHLORIDE 300 MILLIGRAM(S): 150 TABLET, EXTENDED RELEASE ORAL at 10:59

## 2023-12-22 RX ADMIN — Medication 200 MILLIGRAM(S): at 06:39

## 2023-12-22 RX ADMIN — MORPHINE SULFATE 3 MILLIGRAM(S): 50 CAPSULE, EXTENDED RELEASE ORAL at 16:38

## 2023-12-22 RX ADMIN — MORPHINE SULFATE 3 MILLIGRAM(S): 50 CAPSULE, EXTENDED RELEASE ORAL at 15:38

## 2023-12-22 RX ADMIN — PANTOPRAZOLE SODIUM 40 MILLIGRAM(S): 20 TABLET, DELAYED RELEASE ORAL at 10:58

## 2023-12-22 RX ADMIN — MORPHINE SULFATE 3 MILLIGRAM(S): 50 CAPSULE, EXTENDED RELEASE ORAL at 13:17

## 2023-12-22 RX ADMIN — MORPHINE SULFATE 3 MILLIGRAM(S): 50 CAPSULE, EXTENDED RELEASE ORAL at 07:15

## 2023-12-22 RX ADMIN — LAMOTRIGINE 100 MILLIGRAM(S): 25 TABLET, ORALLY DISINTEGRATING ORAL at 08:54

## 2023-12-22 RX ADMIN — Medication 100 MILLIGRAM(S): at 17:23

## 2023-12-22 RX ADMIN — Medication 5 MILLIGRAM(S): at 21:07

## 2023-12-22 RX ADMIN — LAMOTRIGINE 100 MILLIGRAM(S): 25 TABLET, ORALLY DISINTEGRATING ORAL at 21:07

## 2023-12-22 RX ADMIN — MORPHINE SULFATE 3 MILLIGRAM(S): 50 CAPSULE, EXTENDED RELEASE ORAL at 21:44

## 2023-12-22 NOTE — PROGRESS NOTE ADULT - ASSESSMENT
38-year-old female with past medical history of IBS, diabetic, depression, anxiety, borderline personality disorder, current recurrent UTIs presents with abdominal pain

## 2023-12-22 NOTE — PROGRESS NOTE ADULT - SUBJECTIVE AND OBJECTIVE BOX
[facility]  Kai Villegas PA-C  [address] [place] [address]  [phone]      INTERVAL HPI/OVERNIGHT EVENTS:  Pt s/e with father at bedside  Still c/o abdominal pain, nausea, and diarrhea  Otherwise no new GI events    MEDICATIONS  (STANDING):  buPROPion XL (24-Hour) . 300 milliGRAM(s) Oral daily  citalopram 40 milliGRAM(s) Oral daily  lamoTRIgine 100 milliGRAM(s) Oral two times a day  melatonin 5 milliGRAM(s) Oral at bedtime  pantoprazole  Injectable 40 milliGRAM(s) IV Push daily    MEDICATIONS  (PRN):  acetaminophen     Tablet .. 650 milliGRAM(s) Oral every 6 hours PRN Temp greater or equal to 38C (100.4F), Mild Pain (1 - 3)  metoclopramide Injectable 5 milliGRAM(s) IV Push every 8 hours PRN nausea and/or vomiting  morphine  - Injectable 3 milliGRAM(s) IV Push every 4 hours PRN Severe Pain (7 - 10)  ondansetron Injectable 4 milliGRAM(s) IV Push every 6 hours PRN Nausea and/or Vomiting  oxycodone    5 mG/acetaminophen 325 mG 2 Tablet(s) Oral every 4 hours PRN Moderate Pain (4 - 6)  simethicone 80 milliGRAM(s) Chew every 6 hours PRN Upset Stomach      Allergies    doxycycline (Stomach Upset; Nausea)  cats (Unknown)      PHYSICAL EXAM:   Vital Signs:  Vital Signs Last 24 Hrs  T(C): 37.1 (22 Dec 2023 05:07), Max: 37.1 (22 Dec 2023 05:07)  T(F): 98.8 (22 Dec 2023 05:07), Max: 98.8 (22 Dec 2023 05:07)  HR: 81 (22 Dec 2023 05:07) (81 - 87)  BP: 123/85 (22 Dec 2023 05:07) (108/71 - 123/85)  BP(mean): --  RR: 18 (22 Dec 2023 05:07) (18 - 18)  SpO2: 98% (22 Dec 2023 05:07) (97% - 98%)    Parameters below as of 22 Dec 2023 05:07  Patient On (Oxygen Delivery Method): room air      GENERAL:  Appears stated age  HEENT:  NC/AT  CHEST:  Full & symmetric excursion  HEART:  Regular rhythm  ABDOMEN:  Soft, +TTP diffusely, non-distended  EXTEREMITIES:  no cyanosis  SKIN:  No rash  NEURO:  Alert      LABS:                        12.5   5.84  )-----------( 236      ( 21 Dec 2023 07:10 )             34.7     12-21    141  |  112<H>  |  5<L>  ----------------------------<  166<H>  3.8   |  24  |  0.71    Ca    8.1<L>      21 Dec 2023 07:10        Urinalysis Basic - ( 21 Dec 2023 07:10 )    Color: x / Appearance: x / SG: x / pH: x  Gluc: 166 mg/dL / Ketone: x  / Bili: x / Urobili: x   Blood: x / Protein: x / Nitrite: x   Leuk Esterase: x / RBC: x / WBC x   Sq Epi: x / Non Sq Epi: x / Bacteria: x [facility]  Kai Villegas PA-C  [address] [place] [address]  [phone]      INTERVAL HPI/OVERNIGHT EVENTS:  Pt s/e with father at bedside  Still c/o abdominal pain, nausea, and diarrhea  Otherwise no new GI events    MEDICATIONS  (STANDING):  buPROPion XL (24-Hour) . 300 milliGRAM(s) Oral daily  citalopram 40 milliGRAM(s) Oral daily  lamoTRIgine 100 milliGRAM(s) Oral two times a day  melatonin 5 milliGRAM(s) Oral at bedtime  pantoprazole  Injectable 40 milliGRAM(s) IV Push daily    MEDICATIONS  (PRN):  acetaminophen     Tablet .. 650 milliGRAM(s) Oral every 6 hours PRN Temp greater or equal to 38C (100.4F), Mild Pain (1 - 3)  metoclopramide Injectable 5 milliGRAM(s) IV Push every 8 hours PRN nausea and/or vomiting  morphine  - Injectable 3 milliGRAM(s) IV Push every 4 hours PRN Severe Pain (7 - 10)  ondansetron Injectable 4 milliGRAM(s) IV Push every 6 hours PRN Nausea and/or Vomiting  oxycodone    5 mG/acetaminophen 325 mG 2 Tablet(s) Oral every 4 hours PRN Moderate Pain (4 - 6)  simethicone 80 milliGRAM(s) Chew every 6 hours PRN Upset Stomach      Allergies    doxycycline (Stomach Upset; Nausea)  cats (Unknown)      PHYSICAL EXAM:   Vital Signs:  Vital Signs Last 24 Hrs  T(C): 37.1 (22 Dec 2023 05:07), Max: 37.1 (22 Dec 2023 05:07)  T(F): 98.8 (22 Dec 2023 05:07), Max: 98.8 (22 Dec 2023 05:07)  HR: 81 (22 Dec 2023 05:07) (81 - 87)  BP: 123/85 (22 Dec 2023 05:07) (108/71 - 123/85)  BP(mean): --  RR: 18 (22 Dec 2023 05:07) (18 - 18)  SpO2: 98% (22 Dec 2023 05:07) (97% - 98%)    Parameters below as of 22 Dec 2023 05:07  Patient On (Oxygen Delivery Method): room air      GENERAL:  Appears stated age  HEENT:  NC/AT  CHEST:  Full & symmetric excursion  HEART:  Regular rhythm  ABDOMEN:  Soft, +TTP diffusely, non-distended  EXTEREMITIES:  no cyanosis  SKIN:  No rash  NEURO:  Alert      LABS:                        12.5   5.84  )-----------( 236      ( 21 Dec 2023 07:10 )             34.7     12-21    141  |  112<H>  |  5<L>  ----------------------------<  166<H>  3.8   |  24  |  0.71    Ca    8.1<L>      21 Dec 2023 07:10        Urinalysis Basic - ( 21 Dec 2023 07:10 )    Color: x / Appearance: x / SG: x / pH: x  Gluc: 166 mg/dL / Ketone: x  / Bili: x / Urobili: x   Blood: x / Protein: x / Nitrite: x   Leuk Esterase: x / RBC: x / WBC x   Sq Epi: x / Non Sq Epi: x / Bacteria: x

## 2023-12-22 NOTE — PROGRESS NOTE ADULT - PROBLEM SELECTOR PLAN 5
add insulin sliding scale, hypoglycemia protocol, check HgbA1c in the AM add insulin sliding scale, hypoglycemia protocol, check HgbA1c in the AM  - pt reports polyuria - monitor symptoms

## 2023-12-22 NOTE — PROGRESS NOTE ADULT - PROBLEM SELECTOR PLAN 7
#VTE ppx: add scd's  #GI ppx: cont ppi  #Diet: Diet, Clear Liquid (12-22-23 @ 11:56) [Active]  #Activity: Activity - Ambulate as Tolerated:     Time/Priority:  Routine (12-19-23 @ 18:38) [Active]  #ACP: full  #Dispo: further plans pending clinical course - once tolerating diet and LFT's are downtrending, will plan for dc to home w/ outpatient GI - follow up with IBS specialist

## 2023-12-22 NOTE — CASE MANAGEMENT PROGRESS NOTE - NSCMPROGRESSNOTE_GEN_ALL_CORE
Stil on IV Cipro and IV Flagyl and IVP Morphine. Pt started on clears today.  Remains on IVP Morphine. Plan to change to po pain meds. No skilled needs anticipated for discharge. Pt is not medically cleared as of now.  Still on IV Cipro and IV Flagyl and IVP Morphine. Pt started on clears today.  Remains on IVP Morphine. Plan to change to po pain meds. No skilled needs anticipated for discharge. Pt is not medically cleared as of now.

## 2023-12-22 NOTE — PROVIDER CONTACT NOTE (OTHER) - ASSESSMENT
pt has been education on the importance of monitoring blood glucose and insulin. patient states she will not allow us to do this during her stay at the hospital and she will go back to taking metformin when she gets home

## 2023-12-22 NOTE — PROGRESS NOTE ADULT - SUBJECTIVE AND OBJECTIVE BOX
Name: HARLEEN NUNES  MRN: [id number]  LOCATION: [address]    ----  Patient is a 38y old  Female who presents with a chief complaint of abd pain (22 Dec 2023 11:56)      FROM ADMISSION H+P:   HPI:   Patient is a 38-year-old female with past medical history of IBS, diabetic, depression, anxiety, borderline personality disorder, current recurrent UTIs presents with abdominal pain     ----  INTERVAL HPI/OVERNIGHT EVENTS: Pt seen and evaluated at the bedside. No acute overnight events occurred.   Our team assumed care of this patient at 0700 on 12/22/23 at pt request.   Abd pain seems a bit better today per pt report. She is tolerating liquid diet. Still having watery diarrhea. Still having episodic stabbing severe abd pain. Located in periumbilical area and radiates outward peripherally. Only relief of pain has been 4mg of morphine. She states that when she received 2mg she became hot sweaty from the patient and almost passed out. 4mg significantly more efficacious.   I spent about 60 minutes at bedside reviewed the pt's history. Discussed progression of symptoms over time. Her and her father describe 40+ hospital admissions over 20yrs for similar symptoms. She rarely was recommended for antibiotics during those hospitalizations for colitis per her report. She has had 3 colonoscopies (most recent 1yr ago), 2 endoscopies (most recent 08/2023), she has been diagnosed with IBS, gastroenteritis multiple times. She was diagnosed with c.dif 5-6x and completed therapy multiple times w/ recurrence of symptoms. For this episode, she has also been experiencing some cough, nasal congestion and what she describes as cold like symptoms. She has no other focal complaints at this time and is otherwise in her usual state of good health. Pt has recently been trying to lose weight w/ some rather extreme weight loss. She's down about 25lbs in under 12wks utilizing weight watchers (counting points, portion control) but she states that she's been drastically changing her dietary habits from one day to the next based on whether she is due for a "cheat day" and lately has had more cheat days than recommended on the diet schedule. Her a1c historically was over 11 but more recently was around 6-7. She reports having an "all or nothing" personally and styruggles to maintain a balance due to mood dysfunction. She struggles w/ long term goals.     ----  PAST MEDICAL & SURGICAL HISTORY:  Renal colic      Borderline personality disorder      Anxiety      Depression      Recurrent UTI      IBS (irritable bowel syndrome)      Status post cholecystectomy      S/P tonsillectomy          FAMILY HISTORY:  Family history of gallbladder disease (Father, Mother)        Allergies    doxycycline (Stomach Upset; Nausea)  cats (Unknown)    Intolerances        ----  REVIEW OF SYSTEMS:  CONSTITUTIONAL: denies fever, chills  HEENT: as per HPI  CARDIOVASCULAR: denies chest pain, chest pressure, palpitations  RESPIRATORY: as per HPI  GASTROINTESTINAL: as per HPI  NEUROLOGICAL: denies numbness, headache, focal weakness  MUSCULOSKELETAL: denies new joint pain, muscle aches    ----  PHYSICAL EXAM:  GENERAL: patient appears well, no acute distress  ENMT: oropharynx clear without erythema, moist mucous membranes  LUNGS: good air entry bilaterally, clear to auscultation, symmetric breath sounds, no wheezing or rhonchi appreciated  HEART: S1/S2, regular rate and rhythm, no murmurs noted, no noted edema to b/l LE  GASTROINTESTINAL: abdomen is softly distended, hyperactive bowel sounds, minimally tender throughout  MUSCULOSKELETAL: no clubbing or cyanosis, no obvious deformity  NEUROLOGIC: awake, alert, readily interactive, good muscle tone in 4 extremities    T(C): 37.3 (12-22-23 @ 12:07), Max: 37.3 (12-22-23 @ 12:07)  HR: 81 (12-22-23 @ 12:07) (81 - 87)  BP: 111/75 (12-22-23 @ 12:07) (108/71 - 123/85)  RR: 18 (12-22-23 @ 12:07) (18 - 18)  SpO2: 95% (12-22-23 @ 12:07) (95% - 98%)  Wt(kg): --    ----  INTAKE & OUTPUT:  I&O's Summary      LABS:                        12.5   5.84  )-----------( 236      ( 21 Dec 2023 07:10 )             34.7     12-21    141  |  112<H>  |  5<L>  ----------------------------<  166<H>  3.8   |  24  |  0.71    Ca    8.1<L>      21 Dec 2023 07:10        Urinalysis Basic - ( 21 Dec 2023 07:10 )    Color: x / Appearance: x / SG: x / pH: x  Gluc: 166 mg/dL / Ketone: x  / Bili: x / Urobili: x   Blood: x / Protein: x / Nitrite: x   Leuk Esterase: x / RBC: x / WBC x   Sq Epi: x / Non Sq Epi: x / Bacteria: x      CAPILLARY BLOOD GLUCOSE            Culture - Urine (collected 12-19-23 @ 12:35)  Source: Clean Catch Clean Catch (Midstream)  Final Report (12-20-23 @ 23:13):    <10,000 CFU/mL Normal Urogenital Batsheva        ----  DATA REVIEW:  Personally reviewed:  Vital sign trends: [  ] yes    [  ] no     [  ] n/a  Laboratory results: [  ] yes    [  ] no     [  ] n/a  Radiology results: [  ] yes    [  ] no     [  ] n/a  Microbio results: [  ] yes    [  ] no     [  ] n/a  Consultant recommendations: [  ] yes    [  ] no     [  ] n/a            ----  ACTIVE MEDICATIONS (by system):  acetaminophen     Tablet .. 650 milliGRAM(s) Oral every 6 hours PRN  buPROPion XL (24-Hour) . 300 milliGRAM(s) Oral daily  citalopram 40 milliGRAM(s) Oral daily  lamoTRIgine 100 milliGRAM(s) Oral two times a day  melatonin 5 milliGRAM(s) Oral at bedtime  metoclopramide Injectable 5 milliGRAM(s) IV Push every 8 hours PRN  morphine  - Injectable 3 milliGRAM(s) IV Push every 4 hours PRN  ondansetron Injectable 4 milliGRAM(s) IV Push every 6 hours PRN  oxycodone    5 mG/acetaminophen 325 mG 2 Tablet(s) Oral every 4 hours PRN  pantoprazole  Injectable 40 milliGRAM(s) IV Push daily  simethicone 80 milliGRAM(s) Chew every 6 hours PRN       Name: HARLEEN NUNES  MRN: [id number]  LOCATION: [address]    ----  Patient is a 38y old  Female who presents with a chief complaint of abd pain (22 Dec 2023 11:56)      FROM ADMISSION H+P:   HPI:   Patient is a 38-year-old female with past medical history of IBS, diabetic, depression, anxiety, borderline personality disorder, current recurrent UTIs presents with abdominal pain     ----  INTERVAL HPI/OVERNIGHT EVENTS: Pt seen and evaluated at the bedside. No acute overnight events occurred.   Our team assumed care of this patient at 0700 on 12/22/23 at pt request.   Abd pain seems a bit better today per pt report. She is tolerating liquid diet. Still having watery diarrhea. Still having episodic stabbing severe abd pain. Located in periumbilical area and radiates outward peripherally. Only relief of pain has been 4mg of morphine. She states that when she received 2mg she became hot sweaty from the patient and almost passed out. 4mg significantly more efficacious.   I spent about 60 minutes at bedside reviewed the pt's history. Discussed progression of symptoms over time. Her and her father describe 40+ hospital admissions over 20yrs for similar symptoms. She rarely was recommended for antibiotics during those hospitalizations for colitis per her report. She has had 3 colonoscopies (most recent 1yr ago), 2 endoscopies (most recent 08/2023), she has been diagnosed with IBS, gastroenteritis multiple times. She was diagnosed with c.dif 5-6x and completed therapy multiple times w/ recurrence of symptoms. For this episode, she has also been experiencing some cough, nasal congestion and what she describes as cold like symptoms. She has no other focal complaints at this time and is otherwise in her usual state of good health. Pt has recently been trying to lose weight w/ some rather extreme weight loss. She's down about 25lbs in under 12wks utilizing weight watchers (counting points, portion control) but she states that she's been drastically changing her dietary habits from one day to the next based on whether she is due for a "cheat day" and lately has had more cheat days than recommended on the diet schedule. Her a1c historically was over 11 but more recently was around 6-7. She reports having an "all or nothing" personally and styruggles to maintain a balance due to mood dysfunction. She struggles w/ long term goals.     ----  PAST MEDICAL & SURGICAL HISTORY:  Renal colic      Borderline personality disorder      Anxiety      Depression      Recurrent UTI      IBS (irritable bowel syndrome)      Status post cholecystectomy      S/P tonsillectomy          FAMILY HISTORY:  Family history of gallbladder disease (Father, Mother)        Allergies    doxycycline (Stomach Upset; Nausea)  cats (Unknown)    Intolerances        ----  REVIEW OF SYSTEMS:  CONSTITUTIONAL: denies fever, chills  HEENT: as per HPI  CARDIOVASCULAR: denies chest pain, chest pressure, palpitations  RESPIRATORY: as per HPI  GASTROINTESTINAL: as per HPI  NEUROLOGICAL: denies numbness, headache, focal weakness  MUSCULOSKELETAL: denies new joint pain, muscle aches    ----  PHYSICAL EXAM:  GENERAL: patient appears well, no acute distress  ENMT: oropharynx clear without erythema, moist mucous membranes  LUNGS: good air entry bilaterally, clear to auscultation, symmetric breath sounds, no wheezing or rhonchi appreciated  HEART: S1/S2, regular rate and rhythm, no murmurs noted, no noted edema to b/l LE  GASTROINTESTINAL: abdomen is softly distended, hyperactive bowel sounds, minimally tender throughout  MUSCULOSKELETAL: no clubbing or cyanosis, no obvious deformity  NEUROLOGIC: awake, alert, readily interactive, good muscle tone in 4 extremities    T(C): 37.3 (12-22-23 @ 12:07), Max: 37.3 (12-22-23 @ 12:07)  HR: 81 (12-22-23 @ 12:07) (81 - 87)  BP: 111/75 (12-22-23 @ 12:07) (108/71 - 123/85)  RR: 18 (12-22-23 @ 12:07) (18 - 18)  SpO2: 95% (12-22-23 @ 12:07) (95% - 98%)  Wt(kg): --    ----  INTAKE & OUTPUT:  I&O's Summary      LABS:                        12.5   5.84  )-----------( 236      ( 21 Dec 2023 07:10 )             34.7     12-21    141  |  112<H>  |  5<L>  ----------------------------<  166<H>  3.8   |  24  |  0.71    Ca    8.1<L>      21 Dec 2023 07:10        Urinalysis Basic - ( 21 Dec 2023 07:10 )    Color: x / Appearance: x / SG: x / pH: x  Gluc: 166 mg/dL / Ketone: x  / Bili: x / Urobili: x   Blood: x / Protein: x / Nitrite: x   Leuk Esterase: x / RBC: x / WBC x   Sq Epi: x / Non Sq Epi: x / Bacteria: x      CAPILLARY BLOOD GLUCOSE            Culture - Urine (collected 12-19-23 @ 12:35)  Source: Clean Catch Clean Catch (Midstream)  Final Report (12-20-23 @ 23:13):    <10,000 CFU/mL Normal Urogenital Batsheva        ----  DATA REVIEW:  Personally reviewed:  Vital sign trends: [  ] yes    [  ] no     [  ] n/a  Laboratory results: [  ] yes    [  ] no     [  ] n/a  Radiology results: [  ] yes    [  ] no     [  ] n/a  Microbio results: [  ] yes    [  ] no     [  ] n/a  Consultant recommendations: [  ] yes    [  ] no     [  ] n/a            ----  ACTIVE MEDICATIONS (by system):  acetaminophen     Tablet .. 650 milliGRAM(s) Oral every 6 hours PRN  buPROPion XL (24-Hour) . 300 milliGRAM(s) Oral daily  citalopram 40 milliGRAM(s) Oral daily  lamoTRIgine 100 milliGRAM(s) Oral two times a day  melatonin 5 milliGRAM(s) Oral at bedtime  metoclopramide Injectable 5 milliGRAM(s) IV Push every 8 hours PRN  morphine  - Injectable 3 milliGRAM(s) IV Push every 4 hours PRN  ondansetron Injectable 4 milliGRAM(s) IV Push every 6 hours PRN  oxycodone    5 mG/acetaminophen 325 mG 2 Tablet(s) Oral every 4 hours PRN  pantoprazole  Injectable 40 milliGRAM(s) IV Push daily  simethicone 80 milliGRAM(s) Chew every 6 hours PRN       Name: HARLEEN NUNES  MRN: [id number]  LOCATION: [address]    ----  Patient is a 38y old  Female who presents with a chief complaint of abd pain (22 Dec 2023 11:56)      FROM ADMISSION H+P:   HPI:   Patient is a 38-year-old female with past medical history of IBS, diabetic, depression, anxiety, borderline personality disorder, current recurrent UTIs presents with abdominal pain     ----  INTERVAL HPI/OVERNIGHT EVENTS: Pt seen and evaluated at the bedside. No acute overnight events occurred.   Our team assumed care of this patient at 0700 on 12/22/23 at pt request.   Abd pain seems a bit better today per pt report. She is tolerating liquid diet. Still having watery diarrhea. Still having episodic stabbing severe abd pain. Located in periumbilical area and radiates outward peripherally. Only relief of pain has been 4mg of morphine. She states that when she received 2mg she became hot sweaty from the patient and almost passed out. 4mg significantly more efficacious.   I spent about 60 minutes at bedside reviewed the pt's history. Discussed progression of symptoms over time. Her and her father describe 40+ hospital admissions over 20yrs for similar symptoms. She rarely was recommended for antibiotics during those hospitalizations for colitis per her report. She has had 3 colonoscopies (most recent 1yr ago), 2 endoscopies (most recent 08/2023), she has been diagnosed with IBS, gastroenteritis multiple times. She was diagnosed with c.dif 5-6x and completed therapy multiple times w/ recurrence of symptoms. For this episode, she has also been experiencing some cough, nasal congestion and what she describes as cold like symptoms. She has no other focal complaints at this time and is otherwise in her usual state of good health. Pt has recently been trying to lose weight w/ some rather extreme weight loss. She's down about 25lbs in under 12wks utilizing weight watchers (counting points, portion control) but she states that she's been drastically changing her dietary habits from one day to the next based on whether she is due for a "cheat day" and lately has had more cheat days than recommended on the diet schedule. Her a1c historically was over 11 but more recently was around 6-7. She reports having an "all or nothing" personally and styruggles to maintain a balance due to mood dysfunction. She struggles w/ long term goals.     ----  PAST MEDICAL & SURGICAL HISTORY:  Renal colic      Borderline personality disorder      Anxiety      Depression      Recurrent UTI      IBS (irritable bowel syndrome)      Status post cholecystectomy      S/P tonsillectomy          FAMILY HISTORY:  Family history of gallbladder disease (Father, Mother)        Allergies    doxycycline (Stomach Upset; Nausea)  cats (Unknown)    Intolerances        ----  REVIEW OF SYSTEMS:  CONSTITUTIONAL: denies fever, chills  HEENT: as per HPI  CARDIOVASCULAR: denies chest pain, chest pressure, palpitations  RESPIRATORY: as per HPI  GASTROINTESTINAL: as per HPI  NEUROLOGICAL: denies numbness, headache, focal weakness  MUSCULOSKELETAL: denies new joint pain, muscle aches    ----  PHYSICAL EXAM:  GENERAL: patient appears well, no acute distress  ENMT: oropharynx clear without erythema, moist mucous membranes  LUNGS: good air entry bilaterally, clear to auscultation, symmetric breath sounds, no wheezing or rhonchi appreciated  HEART: S1/S2, regular rate and rhythm, no murmurs noted, no noted edema to b/l LE  GASTROINTESTINAL: abdomen is softly distended, hyperactive bowel sounds, minimally tender throughout  MUSCULOSKELETAL: no clubbing or cyanosis, no obvious deformity  NEUROLOGIC: awake, alert, readily interactive, good muscle tone in 4 extremities    T(C): 37.3 (12-22-23 @ 12:07), Max: 37.3 (12-22-23 @ 12:07)  HR: 81 (12-22-23 @ 12:07) (81 - 87)  BP: 111/75 (12-22-23 @ 12:07) (108/71 - 123/85)  RR: 18 (12-22-23 @ 12:07) (18 - 18)  SpO2: 95% (12-22-23 @ 12:07) (95% - 98%)  Wt(kg): --    ----  INTAKE & OUTPUT:  I&O's Summary      LABS:                        12.5   5.84  )-----------( 236      ( 21 Dec 2023 07:10 )             34.7     12-21    141  |  112<H>  |  5<L>  ----------------------------<  166<H>  3.8   |  24  |  0.71    Ca    8.1<L>      21 Dec 2023 07:10        Urinalysis Basic - ( 21 Dec 2023 07:10 )    Color: x / Appearance: x / SG: x / pH: x  Gluc: 166 mg/dL / Ketone: x  / Bili: x / Urobili: x   Blood: x / Protein: x / Nitrite: x   Leuk Esterase: x / RBC: x / WBC x   Sq Epi: x / Non Sq Epi: x / Bacteria: x      CAPILLARY BLOOD GLUCOSE            Culture - Urine (collected 12-19-23 @ 12:35)  Source: Clean Catch Clean Catch (Midstream)  Final Report (12-20-23 @ 23:13):    <10,000 CFU/mL Normal Urogenital Batsheva        ----  DATA REVIEW:  Personally reviewed:  Vital sign trends: [ x ] yes    [  ] no     [  ] n/a  Laboratory results: [ x ] yes    [  ] no     [  ] n/a - labs pending for today - lft's uptrending on prior lab  Radiology results: [ x ] yes    [  ] no     [  ] n/a  Microbio results: [ x ] yes    [  ] no     [  ] n/a  Consultant recommendations: [ x ] yes    [  ] no     [  ] n/a - spoke w/ GI - consulted ID            ----  ACTIVE MEDICATIONS (by system):  - GI - metoclopramide Injectable 5 milliGRAM(s) IV Push every 8 hours PRN  - GI - ondansetron Injectable 4 milliGRAM(s) IV Push every 6 hours PRN  - GI - pantoprazole  Injectable 40 milliGRAM(s) IV Push daily  - GI - simethicone 80 milliGRAM(s) Chew every 6 hours PRN  - MSK - acetaminophen     Tablet .. 650 milliGRAM(s) Oral every 6 hours PRN  - PAIN - morphine  - Injectable 3 milliGRAM(s) IV Push every 4 hours PRN  - PAIN - oxycodone    5 mG/acetaminophen 325 mG 2 Tablet(s) Oral every 4 hours PRN  - PSYCH - buPROPion XL (24-Hour) . 300 milliGRAM(s) Oral daily  - PSYCH - citalopram 40 milliGRAM(s) Oral daily  - PSYCH - lamoTRIgine 100 milliGRAM(s) Oral two times a day  - PSYCH - melatonin 5 milliGRAM(s) Oral at bedtime       Name: HARLEEN NUNES  MRN: [id number]  LOCATION: [address]    ----  Patient is a 38y old  Female who presents with a chief complaint of abd pain (22 Dec 2023 11:56)      FROM ADMISSION H+P:   HPI:   Patient is a 38-year-old female with past medical history of IBS, diabetic, depression, anxiety, borderline personality disorder, current recurrent UTIs presents with abdominal pain     ----  INTERVAL HPI/OVERNIGHT EVENTS: Pt seen and evaluated at the bedside. No acute overnight events occurred.   Our team assumed care of this patient at 0700 on 12/22/23 at pt request.   Abd pain seems a bit better today per pt report. She is tolerating liquid diet. Still having watery diarrhea. Still having episodic stabbing severe abd pain. Located in periumbilical area and radiates outward peripherally. Only relief of pain has been 4mg of morphine. She states that when she received 2mg she became hot sweaty from the patient and almost passed out. 4mg significantly more efficacious.   I spent about 60 minutes at bedside reviewed the pt's history. Discussed progression of symptoms over time. Her and her father describe 40+ hospital admissions over 20yrs for similar symptoms. She rarely was recommended for antibiotics during those hospitalizations for colitis per her report. She has had 3 colonoscopies (most recent 1yr ago), 2 endoscopies (most recent 08/2023), she has been diagnosed with IBS, gastroenteritis multiple times. She was diagnosed with c.dif 5-6x and completed therapy multiple times w/ recurrence of symptoms. For this episode, she has also been experiencing some cough, nasal congestion and what she describes as cold like symptoms. She has no other focal complaints at this time and is otherwise in her usual state of good health. Pt has recently been trying to lose weight w/ some rather extreme weight loss. She's down about 25lbs in under 12wks utilizing weight watchers (counting points, portion control) but she states that she's been drastically changing her dietary habits from one day to the next based on whether she is due for a "cheat day" and lately has had more cheat days than recommended on the diet schedule. Her a1c historically was over 11 but more recently was around 6-7. She reports having an "all or nothing" personally and styruggles to maintain a balance due to mood dysfunction. She struggles w/ long term goals.     ----  PAST MEDICAL & SURGICAL HISTORY:  Renal colic      Borderline personality disorder      Anxiety      Depression      Recurrent UTI      IBS (irritable bowel syndrome)      Status post cholecystectomy      S/P tonsillectomy          FAMILY HISTORY:  Family history of gallbladder disease (Father, Mother)        Allergies    doxycycline (Stomach Upset; Nausea)  cats (Unknown)    Intolerances        ----  REVIEW OF SYSTEMS:  CONSTITUTIONAL: denies fever, chills  HEENT: as per HPI  CARDIOVASCULAR: denies chest pain, chest pressure, palpitations  RESPIRATORY: as per HPI  GASTROINTESTINAL: as per HPI  NEUROLOGICAL: denies numbness, headache, focal weakness  MUSCULOSKELETAL: denies new joint pain, muscle aches    ----  PHYSICAL EXAM:  GENERAL: patient appears well, no acute distress  ENMT: oropharynx clear without erythema, moist mucous membranes  LUNGS: good air entry bilaterally, clear to auscultation, symmetric breath sounds, no wheezing or rhonchi appreciated  HEART: S1/S2, regular rate and rhythm, no murmurs noted, no noted edema to b/l LE  GASTROINTESTINAL: abdomen is softly distended, hyperactive bowel sounds, minimally tender throughout  MUSCULOSKELETAL: no clubbing or cyanosis, no obvious deformity  NEUROLOGIC: awake, alert, readily interactive, good muscle tone in 4 extremities    T(C): 37.3 (12-22-23 @ 12:07), Max: 37.3 (12-22-23 @ 12:07)  HR: 81 (12-22-23 @ 12:07) (81 - 87)  BP: 111/75 (12-22-23 @ 12:07) (108/71 - 123/85)  RR: 18 (12-22-23 @ 12:07) (18 - 18)  SpO2: 95% (12-22-23 @ 12:07) (95% - 98%)  Wt(kg): --    ----  INTAKE & OUTPUT:  I&O's Summary      LABS:                        12.5   5.84  )-----------( 236      ( 21 Dec 2023 07:10 )             34.7     12-21    141  |  112<H>  |  5<L>  ----------------------------<  166<H>  3.8   |  24  |  0.71    Ca    8.1<L>      21 Dec 2023 07:10        Urinalysis Basic - ( 21 Dec 2023 07:10 )    Color: x / Appearance: x / SG: x / pH: x  Gluc: 166 mg/dL / Ketone: x  / Bili: x / Urobili: x   Blood: x / Protein: x / Nitrite: x   Leuk Esterase: x / RBC: x / WBC x   Sq Epi: x / Non Sq Epi: x / Bacteria: x      CAPILLARY BLOOD GLUCOSE            Culture - Urine (collected 12-19-23 @ 12:35)  Source: Clean Catch Clean Catch (Midstream)  Final Report (12-20-23 @ 23:13):    <10,000 CFU/mL Normal Urogenital Batsheva        ----  DATA REVIEW:  Personally reviewed:  Vital sign trends: [ x ] yes    [  ] no     [  ] n/a  Laboratory results: [ x ] yes    [  ] no     [  ] n/a - labs pending for today - lft's uptrending on prior lab  Radiology results: [ x ] yes    [  ] no     [  ] n/a  Microbio results: [ x ] yes    [  ] no     [  ] n/a  Consultant recommendations: [ x ] yes    [  ] no     [  ] n/a - spoke w/ GI - consulted ID            ----  ACTIVE MEDICATIONS (by system):  - GI - metoclopramide Injectable 5 milliGRAM(s) IV Push every 8 hours PRN  - GI - ondansetron Injectable 4 milliGRAM(s) IV Push every 6 hours PRN  - GI - pantoprazole  Injectable 40 milliGRAM(s) IV Push daily  - GI - simethicone 80 milliGRAM(s) Chew every 6 hours PRN  - MSK - acetaminophen     Tablet .. 650 milliGRAM(s) Oral every 6 hours PRN  - PAIN - morphine  - Injectable 3 milliGRAM(s) IV Push every 4 hours PRN  - PAIN - oxycodone    5 mG/acetaminophen 325 mG 2 Tablet(s) Oral every 4 hours PRN  - PSYCH - buPROPion XL (24-Hour) . 300 milliGRAM(s) Oral daily  - PSYCH - citalopram 40 milliGRAM(s) Oral daily  - PSYCH - lamoTRIgine 100 milliGRAM(s) Oral two times a day  - PSYCH - melatonin 5 milliGRAM(s) Oral at bedtime

## 2023-12-22 NOTE — PROGRESS NOTE ADULT - PROBLEM SELECTOR PLAN 4
supportive care - cont home meds - reviewed the meds, adherence, etc with her  - pt follows up with psychiatry q month - reportedly has a moderately good relationship with her psychiatrist and will continue to follow  - on 12/22/23 - her and I reviewed her moods and discussed that a significant component of emotional lability which influences her gastrointestinal function. I reviewed the emotional response of GI function in response to significant fluctuation of her moods. she explained that her jobs have been very stressful for and she's had a quit multiple jobs due to inability to manage her stress. she stated that she is currently attempting to learn some coping mechanisms with her psychiatrist so that she is better equipped to manage stressors. supportive care - cont home meds - reviewed the meds, adherence, etc with her  - pt follows up with psychiatry q month - reportedly has a moderately good relationship with her psychiatrist and will continue to follow  - on 12/22/23 - her and I reviewed her labile moods and discussed the influence this may have on her gastrointestinal function. she explained that her jobs have been very stressful for her and she's had a quit multiple jobs due to the inability to manage her stress and frequent panic attacks. she stated that she is currently attempting to learn some coping mechanisms with her psychiatrist so that she is better equipped to manage stressors. she exprsses good insight to her conditions and was receptive to the counseling provided. her father's presence also appeared reassuring to her.

## 2023-12-22 NOTE — CONSULT NOTE ADULT - ASSESSMENT
Pt is a 38W w/ PMHx of IBS, diabetic, depression, anxiety, borderline personality disorder, current recurrent UTIs presents w/ abdominal pain.    Patient reports she has had vomiting and diarrhea for the past week.  Patient reports last night she developed periumbilical abdominal pain described as sharp stabbing.   CT w/ Acute uncomplicated pancolitis  ID c/s for diarrhea    Hospital course reviewed   was on Abx therapy w/ cipro/flagyl 12/19, discontinued 12/20    Recommendations:   Monitor off Abx  GI PCR to r/o infectious etiology  repeat CT pending   trend temps/WBC  trend LFTs  pain control per primary team  Appreciate GI recs    D/w Dr. Aashish Hernandez covering service from 12/23-12/29  Infectious Diseases will continue to follow. Please call with any questions.   Zuleima Rene M.D.  [facility] Division of Infectious Diseases [phone]  For after 5 P.M. and weekends, please call [phone] Pt is a 38W w/ PMHx of IBS, diabetic, depression, anxiety, borderline personality disorder, current recurrent UTIs presents w/ abdominal pain.    Patient reports she has had vomiting and diarrhea for the past week.  Patient reports last night she developed periumbilical abdominal pain described as sharp stabbing.   CT w/ Acute uncomplicated pancolitis  ID c/s for diarrhea    Hospital course reviewed   was on Abx therapy w/ cipro/flagyl 12/19, discontinued 12/20    Recommendations:   Monitor off Abx  GI PCR to r/o infectious etiology  repeat CT pending   trend temps/WBC  trend LFTs  pain control per primary team  Appreciate GI recs    D/w Dr. Aashish Hernandez covering service from 12/23-12/29  Infectious Diseases will continue to follow. Please call with any questions.   Zuleima Rene M.D.  [facility] Division of Infectious Diseases [phone]  For after 5 P.M. and weekends, please call [phone]

## 2023-12-22 NOTE — PROGRESS NOTE ADULT - ASSESSMENT
Pancolitis  Abdominal pain  N/V/D  History of IBS  Marijuana use    CT noted, d/w pt  Ordered GI PCR  Follow fecal calprotectin  ASCA and ANCA neg  Monitoring off abx for now  Anti-emetics, started reglan prn q8h  Pain control  Reverted to clear liquids  If persistent severe pain can consider repeat CT  Advised marijuana cessation for patient since it may be contributing to nausea  Will need outpatient colonoscopy  Discussed all of the above with patient and father at bedside

## 2023-12-22 NOTE — CONSULT NOTE ADULT - SUBJECTIVE AND OBJECTIVE BOX
[place], Division of Infectious Diseases  MELISA Mathews S. Shah, Y. Patel, G. [facility]  [phone]    HARLEEN NUNES  38y, Female  [id number]    HPI--  HPI:  Patient is a 38-year-old female with past medical history of IBS, diabetic, depression, anxiety, borderline personality disorder, current recurrent UTIs presents with abdominal pain.  Patient reports she has had vomiting and diarrhea for the past week.  Patient reports last night she developed periumbilical abdominal pain described as sharp stabbing.  Patient did not take anything for symptoms. pt endorse associated urinary frequency. Patient reports she has chronic abdominal issues.  Patient had CAT scans in the past without acute findings.  Patient denies fever, chest pain, shortness of breath, dysuria, hematuria   (19 Dec 2023 20:43)    ID c/s for diarrhea  Pt seen at bedside  Reporting excruciating abdominal pain    Active Medications--  acetaminophen     Tablet .. 650 milliGRAM(s) Oral every 6 hours PRN  benzonatate 100 milliGRAM(s) Oral three times a day  buPROPion XL (24-Hour) . 300 milliGRAM(s) Oral daily  citalopram 80 milliGRAM(s) Oral daily  dextrose 5%. 1000 milliLiter(s) IV Continuous <Continuous>  dextrose 5%. 1000 milliLiter(s) IV Continuous <Continuous>  dextrose 50% Injectable 25 Gram(s) IV Push once  dextrose 50% Injectable 25 Gram(s) IV Push once  dextrose 50% Injectable 12.5 Gram(s) IV Push once  dextrose Oral Gel 15 Gram(s) Oral once PRN  glucagon  Injectable 1 milliGRAM(s) IntraMuscular once  insulin lispro (ADMELOG) corrective regimen sliding scale   SubCutaneous three times a day before meals  lamoTRIgine 100 milliGRAM(s) Oral two times a day  loratadine 10 milliGRAM(s) Oral daily  melatonin 5 milliGRAM(s) Oral at bedtime  metoclopramide Injectable 5 milliGRAM(s) IV Push every 8 hours PRN  morphine  - Injectable 3 milliGRAM(s) IV Push once  morphine  - Injectable 3 milliGRAM(s) IV Push every 4 hours PRN  ondansetron Injectable 4 milliGRAM(s) IV Push every 6 hours PRN  oxycodone    5 mG/acetaminophen 325 mG 2 Tablet(s) Oral every 4 hours PRN  pantoprazole  Injectable 40 milliGRAM(s) IV Push daily  simethicone 80 milliGRAM(s) Chew every 6 hours PRN  sodium chloride 0.9% with potassium chloride 40 mEq/L 1000 milliLiter(s) IV Continuous <Continuous>    Antimicrobials:     Immunologic:     ROS:  CONSTITUTIONAL: No fevers or chills. No weakness or headache. No weight changes.  EYES/ENT: No visual or hearing changes. No sore throat or throat pain .  NECK: No pain or stiffness  RESPIRATORY: No cough, wheezing, or hemoptysis. No shortness of breath  CARDIOVASCULAR: No chest pain or palpitations  GASTROINTESTINAL: No abdominal pain. No nausea or vomiting. No diarrhea or constipation.  GENITOURINARY: No dysuria, frequency or hematuria  NEUROLOGICAL: No numbness or weakness  SKIN: No itching or rashes  PSYCHIATRIC: Pleasant. Appropriate affect    Allergies: doxycycline (Stomach Upset; Nausea)  cats (Unknown)    PMH -- Renal colic    Borderline personality disorder    Anxiety    Depression    Recurrent UTI    IBS (irritable bowel syndrome)      PSH -- Status post cholecystectomy    S/P tonsillectomy      FH -- Family history of gallbladder disease (Father, Mother)      Social History --  EtOH: denies   Tobacco: denies   Drug Use: denies     Travel/Environmental/Occupational History:    Physical Exam--  Vital Signs Last 24 Hrs  T(F): 99.1 (22 Dec 2023 12:07), Max: 99.1 (22 Dec 2023 12:07)  HR: 81 (22 Dec 2023 12:07) (81 - 87)  BP: 111/75 (22 Dec 2023 12:07) (108/71 - 123/85)  RR: 18 (22 Dec 2023 12:07) (18 - 18)  SpO2: 95% (22 Dec 2023 12:07) (95% - 98%)  General: young W in pain  HEENT: NC/AT,   Lungs: symmetric chest rise  Heart: RRR  Abdomen: Soft.  Extremities: No cyanosis or clubbing.   Skin: Warm. Dry.     Laboratory & Imaging Data:  CBC:                       13.8   6.54  )-----------( 282      ( 22 Dec 2023 13:10 )             38.0     CMP: 12-22    140  |  109<H>  |  5<L>  ----------------------------<  166<H>  3.4<L>   |  25  |  0.83    Ca    8.9      22 Dec 2023 13:10    TPro  7.1  /  Alb  3.7  /  TBili  0.6  /  DBili  x   /  AST  30  /  ALT  126<H>  /  AlkPhos  74  12-22    LIVER FUNCTIONS - ( 22 Dec 2023 13:10 )  Alb: 3.7 g/dL / Pro: 7.1 g/dL / ALK PHOS: 74 U/L / ALT: 126 U/L / AST: 30 U/L / GGT: x           Urinalysis Basic - ( 22 Dec 2023 13:10 )    Color: x / Appearance: x / SG: x / pH: x  Gluc: 166 mg/dL / Ketone: x  / Bili: x / Urobili: x   Blood: x / Protein: x / Nitrite: x   Leuk Esterase: x / RBC: x / WBC x   Sq Epi: x / Non Sq Epi: x / Bacteria: x        Microbiology: reviewed    Culture - Urine (collected 12-19-23 @ 12:35)  Source: Clean Catch Clean Catch (Midstream)  Final Report (12-20-23 @ 23:13):    <10,000 CFU/mL Normal Urogenital Batsheva          Radiology: reviewed    < from: CT Abdomen and Pelvis w/ IV Cont (12.19.23 @ 15:04) >    ACC: [id number] EXAM:  CT ABDOMEN AND PELVIS IC   ORDERED BY: ANDRES MCBRIDE     PROCEDURE DATE:  12/19/2023          INTERPRETATION:  CLINICAL INFORMATION: Abdominal pain    COMPARISON: CT abdomen 9/6/2023    CONTRAST/COMPLICATIONS:  IV Contrast: Omnipaque 350  90 cc administered   10 cc discarded  Oral Contrast: NONE  Complications: None reported at time of study completion    PROCEDURE:  CT of the Abdomen and Pelvis was performed.  Sagittal and coronal reformats were performed.    FINDINGS:  LOWER CHEST: Within normal limits.    LIVER: Enlarged (22.0 cm) homogeneous similar to prior..  BILE DUCTS: Normal caliber.  GALLBLADDER: Cholecystectomy.  SPLEEN: Within normal limits.  PANCREAS: Within normal limits.  ADRENALS: Within normallimits.  KIDNEYS/URETERS: Within normal limits.    BLADDER: Within normal limits.  REPRODUCTIVE ORGANS: Unremarkable    BOWEL: No bowel obstruction mild contiguous colonic wall thickening   consistent with pancolitis. Appendix normal  PERITONEUM: No ascites.  VESSELS: Within normal limits.  RETROPERITONEUM/LYMPH NODES: No lymphadenopathy.  ABDOMINAL WALL: Within normal limits.  BONES: Right femoral head bone islands.    IMPRESSION:  Acute uncomplicated pancolitis        --- End of Report ---            BENJAMIN LEE MD; Attending Radiologist  This document has been electronically signed. Dec 19 2023  3:16PM    < end of copied text >   [place], Division of Infectious Diseases  MELISA Mathews S. Shah, Y. Patel, G. [facility]  [phone]    HARLEEN NUNES  38y, Female  [id number]    HPI--  HPI:  Patient is a 38-year-old female with past medical history of IBS, diabetic, depression, anxiety, borderline personality disorder, current recurrent UTIs presents with abdominal pain.  Patient reports she has had vomiting and diarrhea for the past week.  Patient reports last night she developed periumbilical abdominal pain described as sharp stabbing.  Patient did not take anything for symptoms. pt endorse associated urinary frequency. Patient reports she has chronic abdominal issues.  Patient had CAT scans in the past without acute findings.  Patient denies fever, chest pain, shortness of breath, dysuria, hematuria   (19 Dec 2023 20:43)    ID c/s for diarrhea  Pt seen at bedside  Reporting excruciating abdominal pain    Active Medications--  acetaminophen     Tablet .. 650 milliGRAM(s) Oral every 6 hours PRN  benzonatate 100 milliGRAM(s) Oral three times a day  buPROPion XL (24-Hour) . 300 milliGRAM(s) Oral daily  citalopram 80 milliGRAM(s) Oral daily  dextrose 5%. 1000 milliLiter(s) IV Continuous <Continuous>  dextrose 5%. 1000 milliLiter(s) IV Continuous <Continuous>  dextrose 50% Injectable 25 Gram(s) IV Push once  dextrose 50% Injectable 25 Gram(s) IV Push once  dextrose 50% Injectable 12.5 Gram(s) IV Push once  dextrose Oral Gel 15 Gram(s) Oral once PRN  glucagon  Injectable 1 milliGRAM(s) IntraMuscular once  insulin lispro (ADMELOG) corrective regimen sliding scale   SubCutaneous three times a day before meals  lamoTRIgine 100 milliGRAM(s) Oral two times a day  loratadine 10 milliGRAM(s) Oral daily  melatonin 5 milliGRAM(s) Oral at bedtime  metoclopramide Injectable 5 milliGRAM(s) IV Push every 8 hours PRN  morphine  - Injectable 3 milliGRAM(s) IV Push once  morphine  - Injectable 3 milliGRAM(s) IV Push every 4 hours PRN  ondansetron Injectable 4 milliGRAM(s) IV Push every 6 hours PRN  oxycodone    5 mG/acetaminophen 325 mG 2 Tablet(s) Oral every 4 hours PRN  pantoprazole  Injectable 40 milliGRAM(s) IV Push daily  simethicone 80 milliGRAM(s) Chew every 6 hours PRN  sodium chloride 0.9% with potassium chloride 40 mEq/L 1000 milliLiter(s) IV Continuous <Continuous>    Antimicrobials:     Immunologic:     ROS:  CONSTITUTIONAL: No fevers or chills. No weakness or headache. No weight changes.  EYES/ENT: No visual or hearing changes. No sore throat or throat pain .  NECK: No pain or stiffness  RESPIRATORY: No cough, wheezing, or hemoptysis. No shortness of breath  CARDIOVASCULAR: No chest pain or palpitations  GASTROINTESTINAL: No abdominal pain. No nausea or vomiting. No diarrhea or constipation.  GENITOURINARY: No dysuria, frequency or hematuria  NEUROLOGICAL: No numbness or weakness  SKIN: No itching or rashes  PSYCHIATRIC: Pleasant. Appropriate affect    Allergies: doxycycline (Stomach Upset; Nausea)  cats (Unknown)    PMH -- Renal colic    Borderline personality disorder    Anxiety    Depression    Recurrent UTI    IBS (irritable bowel syndrome)      PSH -- Status post cholecystectomy    S/P tonsillectomy      FH -- Family history of gallbladder disease (Father, Mother)      Social History --  EtOH: denies   Tobacco: denies   Drug Use: denies     Travel/Environmental/Occupational History:    Physical Exam--  Vital Signs Last 24 Hrs  T(F): 99.1 (22 Dec 2023 12:07), Max: 99.1 (22 Dec 2023 12:07)  HR: 81 (22 Dec 2023 12:07) (81 - 87)  BP: 111/75 (22 Dec 2023 12:07) (108/71 - 123/85)  RR: 18 (22 Dec 2023 12:07) (18 - 18)  SpO2: 95% (22 Dec 2023 12:07) (95% - 98%)  General: young W in pain  HEENT: NC/AT,   Lungs: symmetric chest rise  Heart: RRR  Abdomen: Soft.  Extremities: No cyanosis or clubbing.   Skin: Warm. Dry.     Laboratory & Imaging Data:  CBC:                       13.8   6.54  )-----------( 282      ( 22 Dec 2023 13:10 )             38.0     CMP: 12-22    140  |  109<H>  |  5<L>  ----------------------------<  166<H>  3.4<L>   |  25  |  0.83    Ca    8.9      22 Dec 2023 13:10    TPro  7.1  /  Alb  3.7  /  TBili  0.6  /  DBili  x   /  AST  30  /  ALT  126<H>  /  AlkPhos  74  12-22    LIVER FUNCTIONS - ( 22 Dec 2023 13:10 )  Alb: 3.7 g/dL / Pro: 7.1 g/dL / ALK PHOS: 74 U/L / ALT: 126 U/L / AST: 30 U/L / GGT: x           Urinalysis Basic - ( 22 Dec 2023 13:10 )    Color: x / Appearance: x / SG: x / pH: x  Gluc: 166 mg/dL / Ketone: x  / Bili: x / Urobili: x   Blood: x / Protein: x / Nitrite: x   Leuk Esterase: x / RBC: x / WBC x   Sq Epi: x / Non Sq Epi: x / Bacteria: x        Microbiology: reviewed    Culture - Urine (collected 12-19-23 @ 12:35)  Source: Clean Catch Clean Catch (Midstream)  Final Report (12-20-23 @ 23:13):    <10,000 CFU/mL Normal Urogenital Batsheva          Radiology: reviewed    < from: CT Abdomen and Pelvis w/ IV Cont (12.19.23 @ 15:04) >    ACC: [id number] EXAM:  CT ABDOMEN AND PELVIS IC   ORDERED BY: ANDRES MCBRIDE     PROCEDURE DATE:  12/19/2023          INTERPRETATION:  CLINICAL INFORMATION: Abdominal pain    COMPARISON: CT abdomen 9/6/2023    CONTRAST/COMPLICATIONS:  IV Contrast: Omnipaque 350  90 cc administered   10 cc discarded  Oral Contrast: NONE  Complications: None reported at time of study completion    PROCEDURE:  CT of the Abdomen and Pelvis was performed.  Sagittal and coronal reformats were performed.    FINDINGS:  LOWER CHEST: Within normal limits.    LIVER: Enlarged (22.0 cm) homogeneous similar to prior..  BILE DUCTS: Normal caliber.  GALLBLADDER: Cholecystectomy.  SPLEEN: Within normal limits.  PANCREAS: Within normal limits.  ADRENALS: Within normallimits.  KIDNEYS/URETERS: Within normal limits.    BLADDER: Within normal limits.  REPRODUCTIVE ORGANS: Unremarkable    BOWEL: No bowel obstruction mild contiguous colonic wall thickening   consistent with pancolitis. Appendix normal  PERITONEUM: No ascites.  VESSELS: Within normal limits.  RETROPERITONEUM/LYMPH NODES: No lymphadenopathy.  ABDOMINAL WALL: Within normal limits.  BONES: Right femoral head bone islands.    IMPRESSION:  Acute uncomplicated pancolitis        --- End of Report ---            BENJAMIN LEE MD; Attending Radiologist  This document has been electronically signed. Dec 19 2023  3:16PM    < end of copied text >

## 2023-12-22 NOTE — PROGRESS NOTE ADULT - PROBLEM SELECTOR PLAN 1
pt with acute abd pain not improving and hx of ibs with mulitple colonscopies and egd in past  iv cipro and flagyl  trial of full liquid diet  check stool studies - pending  supportive care  ppi  requiring pain meds - morphine - titrate down labs, exam, symptoms do not current suggest infectious etiology  - will dc abx  - cont full liquid diet and monitor tolerance  - pain control  - some of stool studies were collected  - autoimmune serologies   - supportive care  - ppi  - cont current dose of morphine and monitor tolerance, plan for short interval weaning labs, exam, symptoms do not current suggest infectious etiology  - will dc abx  - downgrade to clear liquid diet and monitor tolerance closely  - pain control  - some of stool studies were collected  - autoimmune serologies   - supportive care  - ppi  - cont current dose of morphine and monitor tolerance, plan for short interval weaning

## 2023-12-22 NOTE — PROGRESS NOTE ADULT - PROBLEM SELECTOR PLAN 2
as above - supportive care  - emotional support provided  - counseling provided regarding long term management - advise close GI / IBS specialist after dc  - we reviewed some effective weight loss strategies that allow for more consistency throughout the week/month. large variations in daily macronutrient intake may be contributing to her frequent exacerbations.

## 2023-12-23 DIAGNOSIS — R10.9 UNSPECIFIED ABDOMINAL PAIN: ICD-10-CM

## 2023-12-23 LAB
A1C WITH ESTIMATED AVERAGE GLUCOSE RESULT: 6 % — HIGH (ref 4–5.6)
A1C WITH ESTIMATED AVERAGE GLUCOSE RESULT: 6 % — HIGH (ref 4–5.6)
ANION GAP SERPL CALC-SCNC: 4 MMOL/L — LOW (ref 5–17)
ANION GAP SERPL CALC-SCNC: 4 MMOL/L — LOW (ref 5–17)
BUN SERPL-MCNC: 4 MG/DL — LOW (ref 7–23)
BUN SERPL-MCNC: 4 MG/DL — LOW (ref 7–23)
CALCIUM SERPL-MCNC: 8.6 MG/DL — SIGNIFICANT CHANGE UP (ref 8.5–10.1)
CALCIUM SERPL-MCNC: 8.6 MG/DL — SIGNIFICANT CHANGE UP (ref 8.5–10.1)
CHLORIDE SERPL-SCNC: 110 MMOL/L — HIGH (ref 96–108)
CHLORIDE SERPL-SCNC: 110 MMOL/L — HIGH (ref 96–108)
CO2 SERPL-SCNC: 27 MMOL/L — SIGNIFICANT CHANGE UP (ref 22–31)
CO2 SERPL-SCNC: 27 MMOL/L — SIGNIFICANT CHANGE UP (ref 22–31)
CREAT SERPL-MCNC: 0.81 MG/DL — SIGNIFICANT CHANGE UP (ref 0.5–1.3)
CREAT SERPL-MCNC: 0.81 MG/DL — SIGNIFICANT CHANGE UP (ref 0.5–1.3)
EGFR: 95 ML/MIN/1.73M2 — SIGNIFICANT CHANGE UP
EGFR: 95 ML/MIN/1.73M2 — SIGNIFICANT CHANGE UP
ESTIMATED AVERAGE GLUCOSE: 126 MG/DL — HIGH (ref 68–114)
ESTIMATED AVERAGE GLUCOSE: 126 MG/DL — HIGH (ref 68–114)
GI PCR PANEL: SIGNIFICANT CHANGE UP
GI PCR PANEL: SIGNIFICANT CHANGE UP
GLUCOSE SERPL-MCNC: 149 MG/DL — HIGH (ref 70–99)
GLUCOSE SERPL-MCNC: 149 MG/DL — HIGH (ref 70–99)
HCT VFR BLD CALC: 37.4 % — SIGNIFICANT CHANGE UP (ref 34.5–45)
HCT VFR BLD CALC: 37.4 % — SIGNIFICANT CHANGE UP (ref 34.5–45)
HGB BLD-MCNC: 13.3 G/DL — SIGNIFICANT CHANGE UP (ref 11.5–15.5)
HGB BLD-MCNC: 13.3 G/DL — SIGNIFICANT CHANGE UP (ref 11.5–15.5)
MCHC RBC-ENTMCNC: 31.5 PG — SIGNIFICANT CHANGE UP (ref 27–34)
MCHC RBC-ENTMCNC: 31.5 PG — SIGNIFICANT CHANGE UP (ref 27–34)
MCHC RBC-ENTMCNC: 35.6 GM/DL — SIGNIFICANT CHANGE UP (ref 32–36)
MCHC RBC-ENTMCNC: 35.6 GM/DL — SIGNIFICANT CHANGE UP (ref 32–36)
MCV RBC AUTO: 88.6 FL — SIGNIFICANT CHANGE UP (ref 80–100)
MCV RBC AUTO: 88.6 FL — SIGNIFICANT CHANGE UP (ref 80–100)
NRBC # BLD: 0 /100 WBCS — SIGNIFICANT CHANGE UP (ref 0–0)
NRBC # BLD: 0 /100 WBCS — SIGNIFICANT CHANGE UP (ref 0–0)
PLATELET # BLD AUTO: 273 K/UL — SIGNIFICANT CHANGE UP (ref 150–400)
PLATELET # BLD AUTO: 273 K/UL — SIGNIFICANT CHANGE UP (ref 150–400)
POTASSIUM SERPL-MCNC: 3.9 MMOL/L — SIGNIFICANT CHANGE UP (ref 3.5–5.3)
POTASSIUM SERPL-MCNC: 3.9 MMOL/L — SIGNIFICANT CHANGE UP (ref 3.5–5.3)
POTASSIUM SERPL-SCNC: 3.9 MMOL/L — SIGNIFICANT CHANGE UP (ref 3.5–5.3)
POTASSIUM SERPL-SCNC: 3.9 MMOL/L — SIGNIFICANT CHANGE UP (ref 3.5–5.3)
RBC # BLD: 4.22 M/UL — SIGNIFICANT CHANGE UP (ref 3.8–5.2)
RBC # BLD: 4.22 M/UL — SIGNIFICANT CHANGE UP (ref 3.8–5.2)
RBC # FLD: 12.1 % — SIGNIFICANT CHANGE UP (ref 10.3–14.5)
RBC # FLD: 12.1 % — SIGNIFICANT CHANGE UP (ref 10.3–14.5)
SODIUM SERPL-SCNC: 141 MMOL/L — SIGNIFICANT CHANGE UP (ref 135–145)
SODIUM SERPL-SCNC: 141 MMOL/L — SIGNIFICANT CHANGE UP (ref 135–145)
WBC # BLD: 6.18 K/UL — SIGNIFICANT CHANGE UP (ref 3.8–10.5)
WBC # BLD: 6.18 K/UL — SIGNIFICANT CHANGE UP (ref 3.8–10.5)
WBC # FLD AUTO: 6.18 K/UL — SIGNIFICANT CHANGE UP (ref 3.8–10.5)
WBC # FLD AUTO: 6.18 K/UL — SIGNIFICANT CHANGE UP (ref 3.8–10.5)

## 2023-12-23 PROCEDURE — 93010 ELECTROCARDIOGRAM REPORT: CPT

## 2023-12-23 PROCEDURE — 99233 SBSQ HOSP IP/OBS HIGH 50: CPT | Mod: GC

## 2023-12-23 RX ORDER — MORPHINE SULFATE 50 MG/1
3 CAPSULE, EXTENDED RELEASE ORAL EVERY 6 HOURS
Refills: 0 | Status: DISCONTINUED | OUTPATIENT
Start: 2023-12-23 | End: 2023-12-23

## 2023-12-23 RX ORDER — MORPHINE SULFATE 50 MG/1
1.5 CAPSULE, EXTENDED RELEASE ORAL EVERY 6 HOURS
Refills: 0 | Status: DISCONTINUED | OUTPATIENT
Start: 2023-12-23 | End: 2023-12-23

## 2023-12-23 RX ORDER — MORPHINE SULFATE 50 MG/1
1.5 CAPSULE, EXTENDED RELEASE ORAL EVERY 4 HOURS
Refills: 0 | Status: DISCONTINUED | OUTPATIENT
Start: 2023-12-23 | End: 2023-12-24

## 2023-12-23 RX ORDER — METOCLOPRAMIDE HCL 10 MG
10 TABLET ORAL EVERY 8 HOURS
Refills: 0 | Status: DISCONTINUED | OUTPATIENT
Start: 2023-12-23 | End: 2023-12-25

## 2023-12-23 RX ORDER — LIDOCAINE 4 G/100G
1 CREAM TOPICAL DAILY
Refills: 0 | Status: DISCONTINUED | OUTPATIENT
Start: 2023-12-23 | End: 2023-12-25

## 2023-12-23 RX ORDER — MORPHINE SULFATE 50 MG/1
3 CAPSULE, EXTENDED RELEASE ORAL EVERY 4 HOURS
Refills: 0 | Status: DISCONTINUED | OUTPATIENT
Start: 2023-12-23 | End: 2023-12-24

## 2023-12-23 RX ORDER — METOCLOPRAMIDE HCL 10 MG
5 TABLET ORAL AT BEDTIME
Refills: 0 | Status: DISCONTINUED | OUTPATIENT
Start: 2023-12-23 | End: 2023-12-25

## 2023-12-23 RX ADMIN — MORPHINE SULFATE 3 MILLIGRAM(S): 50 CAPSULE, EXTENDED RELEASE ORAL at 21:08

## 2023-12-23 RX ADMIN — Medication 5 MILLIGRAM(S): at 21:07

## 2023-12-23 RX ADMIN — Medication 100 MILLIGRAM(S): at 21:08

## 2023-12-23 RX ADMIN — MORPHINE SULFATE 3 MILLIGRAM(S): 50 CAPSULE, EXTENDED RELEASE ORAL at 05:24

## 2023-12-23 RX ADMIN — MORPHINE SULFATE 3 MILLIGRAM(S): 50 CAPSULE, EXTENDED RELEASE ORAL at 14:37

## 2023-12-23 RX ADMIN — Medication 100 MILLIGRAM(S): at 05:24

## 2023-12-23 RX ADMIN — Medication 650 MILLIGRAM(S): at 12:42

## 2023-12-23 RX ADMIN — MORPHINE SULFATE 3 MILLIGRAM(S): 50 CAPSULE, EXTENDED RELEASE ORAL at 10:00

## 2023-12-23 RX ADMIN — Medication 10 MILLIGRAM(S): at 12:43

## 2023-12-23 RX ADMIN — CITALOPRAM 80 MILLIGRAM(S): 10 TABLET, FILM COATED ORAL at 09:10

## 2023-12-23 RX ADMIN — Medication 5 MILLIGRAM(S): at 21:08

## 2023-12-23 RX ADMIN — MORPHINE SULFATE 3 MILLIGRAM(S): 50 CAPSULE, EXTENDED RELEASE ORAL at 13:00

## 2023-12-23 RX ADMIN — SIMETHICONE 80 MILLIGRAM(S): 80 TABLET, CHEWABLE ORAL at 10:22

## 2023-12-23 RX ADMIN — BUPROPION HYDROCHLORIDE 300 MILLIGRAM(S): 150 TABLET, EXTENDED RELEASE ORAL at 09:10

## 2023-12-23 RX ADMIN — LIDOCAINE 1 PATCH: 4 CREAM TOPICAL at 19:30

## 2023-12-23 RX ADMIN — LAMOTRIGINE 100 MILLIGRAM(S): 25 TABLET, ORALLY DISINTEGRATING ORAL at 21:08

## 2023-12-23 RX ADMIN — Medication 100 MILLIGRAM(S): at 00:04

## 2023-12-23 RX ADMIN — Medication 100 MILLIGRAM(S): at 14:31

## 2023-12-23 RX ADMIN — Medication 650 MILLIGRAM(S): at 13:00

## 2023-12-23 RX ADMIN — LAMOTRIGINE 100 MILLIGRAM(S): 25 TABLET, ORALLY DISINTEGRATING ORAL at 09:10

## 2023-12-23 RX ADMIN — PANTOPRAZOLE SODIUM 40 MILLIGRAM(S): 20 TABLET, DELAYED RELEASE ORAL at 09:36

## 2023-12-23 RX ADMIN — LORATADINE 10 MILLIGRAM(S): 10 TABLET ORAL at 11:16

## 2023-12-23 RX ADMIN — MORPHINE SULFATE 3 MILLIGRAM(S): 50 CAPSULE, EXTENDED RELEASE ORAL at 09:36

## 2023-12-23 RX ADMIN — LIDOCAINE 1 PATCH: 4 CREAM TOPICAL at 12:40

## 2023-12-23 NOTE — PROGRESS NOTE ADULT - ASSESSMENT
Pancolitis  Abdominal pain  N/V/D  History of IBS  Marijuana use    CT noted, d/w pt  GI PCR-Negative   Follow fecal calprotectin  ASCA and ANCA neg  Monitoring off abx for now  Anti-emetics, started reglan prn q8h  Pain control  Reverted to clear liquids  If persistent severe pain can consider repeat CT  Advised marijuana cessation for patient since it may be contributing to nausea  Will need outpatient colonoscopy      Advanced care planning was discussed with patient and family.  Advanced care planning forms were reviewed and discussed.  Risks, benefits and alternatives of gastroenterologic procedures were discussed in detail and all questions were answered.    30 minutes spent.

## 2023-12-23 NOTE — PROGRESS NOTE ADULT - PROBLEM SELECTOR PLAN 4
Chronic  - continue home meds bupropion, citalopram, lamotrigine.  - pt follows up with psychiatry q month - reportedly has a moderately good relationship with her psychiatrist and will continue to follow

## 2023-12-23 NOTE — PROGRESS NOTE ADULT - SUBJECTIVE AND OBJECTIVE BOX
INTERVAL HPI/OVERNIGHT EVENTS:  Pt seen and examined   Reports having intermittent abd pain  Still having nauseous and diarrhea   No vomiting       MEDICATIONS  (STANDING):  benzonatate 100 milliGRAM(s) Oral three times a day  buPROPion XL (24-Hour) . 300 milliGRAM(s) Oral daily  citalopram 80 milliGRAM(s) Oral daily  dextrose 5%. 1000 milliLiter(s) (50 mL/Hr) IV Continuous <Continuous>  dextrose 5%. 1000 milliLiter(s) (100 mL/Hr) IV Continuous <Continuous>  dextrose 50% Injectable 25 Gram(s) IV Push once  dextrose 50% Injectable 25 Gram(s) IV Push once  dextrose 50% Injectable 12.5 Gram(s) IV Push once  glucagon  Injectable 1 milliGRAM(s) IntraMuscular once  insulin lispro (ADMELOG) corrective regimen sliding scale   SubCutaneous three times a day before meals  lamoTRIgine 100 milliGRAM(s) Oral two times a day  loratadine 10 milliGRAM(s) Oral daily  melatonin 5 milliGRAM(s) Oral at bedtime  pantoprazole  Injectable 40 milliGRAM(s) IV Push daily  sodium chloride 0.9% with potassium chloride 40 mEq/L 1000 milliLiter(s) (50 mL/Hr) IV Continuous <Continuous>    MEDICATIONS  (PRN):  acetaminophen     Tablet .. 650 milliGRAM(s) Oral every 6 hours PRN Temp greater or equal to 38C (100.4F), Mild Pain (1 - 3)  dextrose Oral Gel 15 Gram(s) Oral once PRN Blood Glucose LESS THAN 70 milliGRAM(s)/deciliter  dicyclomine 10 milliGRAM(s) Oral three times a day before meals PRN abdominal pain  metoclopramide Injectable 5 milliGRAM(s) IV Push every 8 hours PRN nausea and/or vomiting  morphine  - Injectable 1.5 milliGRAM(s) IV Push every 6 hours PRN Moderate Pain (4 - 6)  morphine  - Injectable 3 milliGRAM(s) IV Push every 6 hours PRN Severe Pain (7 - 10)  ondansetron Injectable 4 milliGRAM(s) IV Push every 6 hours PRN Nausea and/or Vomiting  simethicone 80 milliGRAM(s) Chew every 6 hours PRN Upset Stomach      Allergies  doxycycline (Stomach Upset; Nausea)  cats (Unknown)    Intolerances    REVIEW OF SYSTEMS:  CONSTITUTIONAL: No fever or chills  HEENT:  No headache, no sore throat  RESPIRATORY: No cough, wheezing, or shortness of breath  CARDIOVASCULAR: No chest pain, palpitations, or leg swelling  GASTROINTESTINAL: + abd pain, +nausea, No vomiting, or + diarrhea  GENITOURINARY: No dysuria, frequency, or hematuria  NEUROLOGICAL: no focal weakness or dizziness  MUSCULOSKELETAL: no myalgias       Vital Signs Last 24 Hrs  T(C): 37 (23 Dec 2023 04:43), Max: 37.3 (22 Dec 2023 12:07)  T(F): 98.6 (23 Dec 2023 04:43), Max: 99.1 (22 Dec 2023 12:07)  HR: 88 (23 Dec 2023 04:43) (81 - 88)  BP: 97/66 (23 Dec 2023 04:43) (97/66 - 128/82)  BP(mean): --  RR: 18 (23 Dec 2023 04:43) (17 - 18)  SpO2: 95% (23 Dec 2023 04:43) (95% - 96%)    Parameters below as of 23 Dec 2023 04:43  Patient On (Oxygen Delivery Method): room air    PHYSICAL EXAM:  GENERAL: NAD  HEENT:  NC/AT, EOMI, moist mucous membranes  CHEST/LUNG:  CTA b/l, no rales, wheezes, or rhonchi  HEART:  RRR, S1, S2  ABDOMEN:  BS+, soft, nontender, nondistended  EXTREMITIES: no edema, cyanosis, or calf tenderness  NERVOUS SYSTEM: AA&Ox3      LABS:                        13.3   6.18  )-----------( 273      ( 23 Dec 2023 08:19 )             37.4     12-23    141  |  110<H>  |  4<L>  ----------------------------<  149<H>  3.9   |  27  |  0.81    Ca    8.6      23 Dec 2023 08:19    TPro  7.1  /  Alb  3.7  /  TBili  0.6  /  DBili  x   /  AST  30  /  ALT  126<H>  /  AlkPhos  74  12-22                      RADIOLOGY

## 2023-12-23 NOTE — PROGRESS NOTE ADULT - PROBLEM SELECTOR PLAN 3
Chronic  - GI PCR negative. f/u fecal calprotectin  - ANCA ASCA negative  - continue supportive care.  - GI following

## 2023-12-23 NOTE — CONSULT NOTE ADULT - ASSESSMENT
A/P 38y year old Female with abdominal pain due to pan colitis.    Istop reviewed: Reference #: [id number]  Previous hx of rx for tramadol 50mg    Patient pain controlled well on her current regimen, as colitis improves can titrate downwards. From 3 and 1.5, can go down to 2mg and 1mg IV, then 1mg, then off. One day in between subsequent reductions is sufficient at such low doses.    Primary team notes are reviewed  Laboratory studies reviewed including those mentioned earlier/above  Discussed management/coordinated care with primary team/referring provider.  High risk of morbidity from treatment with: schedule II narcotic pain medications. A/P 38y year old Female with abdominal pain due to pan colitis.    Istop reviewed: Reference #: [id number]  Previous hx of rx for tramadol 50mg    Patient pain controlled well on her current regimen, as colitis improves can titrate downwards. From 3 and 1.5, can go down to 2mg and 1mg IV, then 1mg, then off. One day in between subsequent reductions is sufficient at such low doses.    Primary team notes are reviewed  Laboratory studies reviewed including those mentioned earlier/above  Discussed management/coordinated care with primary team/referring provider.  High risk of morbidity from treatment with: schedule II narcotic pain medications.

## 2023-12-23 NOTE — CONSULT NOTE ADULT - SUBJECTIVE AND OBJECTIVE BOX
Physical Medicine and Rehabilitation Initial Evaluation    Patients acute care records reviewed and are summarized as follows:     Patient is a 38y Female who is admitted to acute care for pan colitis, referred for pain mgmt.    Radiological studies reviewed, including CT abd pelvis showing pan colitis.    Medical studies/laboratory studies reviewed, including renal function which is within normal limits.    The patient was seen and examined at bedside. Patient notes her pain is controlled on her current regimen.    ROS:  Constitutional: Denies fevers or chills  GI: + Abd pain    PAST MEDICAL & SURGICAL HISTORY:  Renal colic  Borderline personality disorder  Anxiety  Depression  Recurrent UTI  IBS (irritable bowel syndrome)  Status post cholecystectomy  S/P tonsillectomy    Medications: reviewed    Physical Exam:     Constitutional: Gen: In no acute distress, cooperative with exam and questioning   GI: Tender to palpation diffusely, no rebound or rigidity  Psychiatric: Awake alert fully oriented

## 2023-12-23 NOTE — PROGRESS NOTE ADULT - ASSESSMENT
38-year-old female with past medical history of IBS, diabetic, depression, anxiety, borderline personality disorder, current recurrent UTIs presents with abdominal pain. Found to have pancolitis.

## 2023-12-23 NOTE — PROGRESS NOTE ADULT - ASSESSMENT
Pt is a 38W w/ PMHx of IBS, diabetic, depression, anxiety, borderline personality disorder, current recurrent UTIs presents w/ abdominal pain, recurrent C diff.    Patient reports she has had vomiting and diarrhea for the past week PTA.  Patient then developed periumbilical abdominal pain described as sharp stabbing.   CT w/ Acute uncomplicated pancolitis  ID c/s for diarrhea  was on Abx therapy w/ cipro/flagyl 12/19, discontinued 12/20    Recommendations:   GI-PCR neg  Improved pancolitis without specific Rx  obs off abx  can dc isolation precautions    Thank you for consulting us and involving us in the management of this most interesting and challenging case.  We will follow along in the care of this patient. Please call us at [phone] or text me directly on my cell# at [phone] with any concerns.     Pt is a 38W w/ PMHx of IBS, diabetic, depression, anxiety, borderline personality disorder, current recurrent UTIs presents w/ abdominal pain, recurrent C diff.    Patient reports she has had vomiting and diarrhea for the past week PTA.  Patient then developed periumbilical abdominal pain described as sharp stabbing.   CT w/ Acute uncomplicated pancolitis  ID c/s for diarrhea  was on Abx therapy w/ cipro/flagyl 12/19, discontinued 12/20    Recommendations:   GI-PCR neg  Improved pancolitis without specific Rx  obs off abx  can dc isolation precautions    Thank you for consulting us and involving us in the management of this most interesting and challenging case.  We will follow along in the care of this patient. Please call us at [phone] or text me directly on my cell# at [phone] with any concerns.

## 2023-12-23 NOTE — PROGRESS NOTE ADULT - PROBLEM SELECTOR PLAN 1
Complaining of severe periumbilical pain   - repeat CT abd/pelvis - improved pancolitis.  - will monitor off abx as symptoms do not appear to be infectious in etiology.  - advanced to full liquid diet, will monitor closely for tolerance.   - PRN pain control: mild-tylenol. mod-morphine 1.5mg q6. sev-morphine 3mg q6. Will continue weening.  - started bentyl prn for abdominal pain.   - will apply lidocaine patch to region  - switched reglan to 5mg qhs.  - continue ppi  - GI following Complaining of severe periumbilical pain   - repeat CT abd/pelvis - improved pancolitis.  - will monitor off abx as symptoms do not appear to be infectious in etiology.  - advanced to full liquid diet, will monitor closely for tolerance.   - PRN pain control: mild-tylenol. mod-morphine 1.5mg q6. sev-morphine 3mg q6. Will continue weening.  - started bentyl prn for abdominal pain.   - will apply lidocaine patch to region  - start PO reglan 5mg qhs.  - continue ppi  - GI following  - ID following Complaining of severe periumbilical pain   - repeat CT abd/pelvis - improved pancolitis.  - will monitor off abx as symptoms do not appear to be infectious in etiology.  - advanced to full liquid diet, will monitor closely for tolerance.   - PRN pain control: mild-tylenol. mod-morphine 1.5mg q6. sev-morphine 3mg q6. Will continue weaning.  - started bentyl prn for abdominal pain.   - will apply lidocaine patch to region  - start PO reglan 5mg qhs.  - continue ppi  - GI following  - ID following

## 2023-12-23 NOTE — PROGRESS NOTE ADULT - SUBJECTIVE AND OBJECTIVE BOX
[facility] DIVISION of INFECTIOUS DISEASE  Toan Hernandez MD PhD, Eli Fernandes MD, Zuleima Rene MD, Roge James MD, Hal Dobbins MD  and providing coverage with Jenise Mason MD  Providing Infectious Disease Consultations at [facility], [facility]'s    Office# [phone] to schedule follow up appointments  Answering Service for urgent calls or New Consults [phone]  Cell# to text for urgent issues Toan Hernandez [phone]     infectious diseases progress note:    HARLEEN NUNES is a 38y y. o. Female patient    Overnight and events of the last 24hrs reviewed    Allergies    doxycycline (Stomach Upset; Nausea)  cats (Unknown)    Intolerances        ANTIBIOTICS/RELEVANT:  antimicrobials    immunologic:    OTHER:  acetaminophen     Tablet .. 650 milliGRAM(s) Oral every 6 hours PRN  benzonatate 100 milliGRAM(s) Oral three times a day  buPROPion XL (24-Hour) . 300 milliGRAM(s) Oral daily  citalopram 80 milliGRAM(s) Oral daily  dextrose 5%. 1000 milliLiter(s) IV Continuous <Continuous>  dextrose 5%. 1000 milliLiter(s) IV Continuous <Continuous>  dextrose 50% Injectable 25 Gram(s) IV Push once  dextrose 50% Injectable 25 Gram(s) IV Push once  dextrose 50% Injectable 12.5 Gram(s) IV Push once  dextrose Oral Gel 15 Gram(s) Oral once PRN  dicyclomine 10 milliGRAM(s) Oral three times a day before meals PRN  glucagon  Injectable 1 milliGRAM(s) IntraMuscular once  insulin lispro (ADMELOG) corrective regimen sliding scale   SubCutaneous three times a day before meals  lamoTRIgine 100 milliGRAM(s) Oral two times a day  lidocaine   4% Patch 1 Patch Transdermal daily  loratadine 10 milliGRAM(s) Oral daily  melatonin 5 milliGRAM(s) Oral at bedtime  metoclopramide 5 milliGRAM(s) Oral at bedtime  morphine  - Injectable 3 milliGRAM(s) IV Push every 6 hours PRN  morphine  - Injectable 1.5 milliGRAM(s) IV Push every 6 hours PRN  ondansetron Injectable 4 milliGRAM(s) IV Push every 6 hours PRN  pantoprazole  Injectable 40 milliGRAM(s) IV Push daily  simethicone 80 milliGRAM(s) Chew every 6 hours PRN  sodium chloride 0.9% with potassium chloride 40 mEq/L 1000 milliLiter(s) IV Continuous <Continuous>      Objective:  Vital Signs Last 24 Hrs  T(C): 37.1 (23 Dec 2023 11:43), Max: 37.1 (23 Dec 2023 11:43)  T(F): 98.8 (23 Dec 2023 11:43), Max: 98.8 (23 Dec 2023 11:43)  HR: 92 (23 Dec 2023 11:43) (82 - 92)  BP: 123/88 (23 Dec 2023 11:43) (97/66 - 128/82)  BP(mean): --  RR: 18 (23 Dec 2023 11:43) (17 - 18)  SpO2: 97% (23 Dec 2023 11:43) (95% - 97%)    Parameters below as of 23 Dec 2023 11:43  Patient On (Oxygen Delivery Method): room air        T(C): 37.1 (12-23-23 @ 11:43), Max: 37.3 (12-22-23 @ 12:07)  T(C): 37.1 (12-23-23 @ 11:43), Max: 37.3 (12-22-23 @ 12:07)  T(C): 37.1 (12-23-23 @ 11:43), Max: 37.5 (12-20-23 @ 12:02)    PHYSICAL EXAM:  HEENT: NC atraumatic  Neck: supple  Respiratory: no accessory muscle use, breathing comfortably  Cardiovascular: distant  Gastrointestinal: normal appearing, nondistended  Extremities: no clubbing, no cyanosis,        LABS:                          13.3   6.18  )-----------( 273      ( 23 Dec 2023 08:19 )             37.4       WBC  6.18 12-23 @ 08:19  6.54 12-22 @ 13:10  5.84 12-21 @ 07:10  6.00 12-20 @ 06:27  9.69 12-19 @ 12:35      12-23    141  |  110<H>  |  4<L>  ----------------------------<  149<H>  3.9   |  27  |  0.81    Ca    8.6      23 Dec 2023 08:19    TPro  7.1  /  Alb  3.7  /  TBili  0.6  /  DBili  x   /  AST  30  /  ALT  126<H>  /  AlkPhos  74  12-22      Creatinine: 0.81 mg/dL (12-23-23 @ 08:19)  Creatinine: 0.83 mg/dL (12-22-23 @ 13:10)  Creatinine: 0.71 mg/dL (12-21-23 @ 07:10)  Creatinine: 0.89 mg/dL (12-20-23 @ 06:27)  Creatinine: 0.95 mg/dL (12-19-23 @ 12:35)        Urinalysis Basic - ( 23 Dec 2023 08:19 )    Color: x / Appearance: x / SG: x / pH: x  Gluc: 149 mg/dL / Ketone: x  / Bili: x / Urobili: x   Blood: x / Protein: x / Nitrite: x   Leuk Esterase: x / RBC: x / WBC x   Sq Epi: x / Non Sq Epi: x / Bacteria: x            INFLAMMATORY MARKERS      MICROBIOLOGY:    GI PCR Panel: [facility]: GI Panel PCR evaluates for:  Campylobacter, Plesiomonas shigelloides, Salmonella, Vibrio, Yersinia  enterocolitica, Enteroaggregative Escherichia (EAEC), Enteropathogenic E.  coli (EPEC), Enterotoxigenic E. coli (ETEC), Shiga-like toxin producing  E.coli (STEC), E. coli O157, Shigella/Enteroinvasive E. coli (EIEC),  Adenovirus, Astrovirus, Norovirus, Rotavirus, Sapovirus, Cryptosporidium,  Cyclospora cayetanensis, Entamoeba histolytica, Giardia lamblia.  For culture and susceptibility reports refer to "reflex stool culture". (12.22.23 @ 16:56)        RADIOLOGY & ADDITIONAL STUDIES:  < from: CT Abdomen and Pelvis w/ IV Cont (12.22.23 @ 16:09) >    ACC: [id number] EXAM:  CT ABDOMEN AND PELVIS IC   ORDERED BY: DARRYL GARCIA     PROCEDURE DATE:  12/22/2023          INTERPRETATION:  CLINICAL INFORMATION: Severe abdominal pain. Pancolitis   on CT 2 days ago.    COMPARISON: Multiple prior exams,with most recent CT abdomen/pelvis   12/19/2023    CONTRAST/COMPLICATIONS:  IV Contrast: Omnipaque 350  90 cc administered   10 cc discarded  Oral Contrast: NONE  Complications: None reported at time of study completion    PROCEDURE:  CT of the Abdomen and Pelvis was performed.  Sagittal and coronal reformats were performed.    FINDINGS:  LOWER CHEST: Within normal limits.    LIVER: Within normal limits.  BILE DUCTS: Normal caliber.  GALLBLADDER: Cholecystectomy.  SPLEEN: Within normal limits.  PANCREAS: Within normal limits.  ADRENALS: Within normal limits.  KIDNEYS/URETERS: No renal stones or hydronephrosis.    BLADDER: Within normal limits.  REPRODUCTIVE ORGANS: Uterus and adnexa within normal limits.    BOWEL: No bowel obstruction. Previous colonic wall thickening is   improved. Normal appendix.  PERITONEUM: No ascites.  VESSELS: Within normal limits.  RETROPERITONEUM/LYMPH NODES: No lymphadenopathy.  ABDOMINAL WALL: Within normal limits.  BONES: No acute findings.    IMPRESSION:  Improved pancolitis. No new acute findings.       [facility] DIV[facility] of INFECTIOUS DISEASE  Toan Hernandez MD PhD, Eli Fernandes MD, Zuleima Rene MD, Roge James MD, Hal Dobbins MD  and providing coverage with Jenise Msaon MD  Providing Infectious Disease Consultations at [facility], [facility]'s    Office# [phone] to schedule follow up appointments  Answering Service for urgent calls or New Consults [phone]  Cell# to text for urgent issues Toan Hernandez [phone]     infectious diseases progress note:    HARLEEN NUNES is a 38y y. o. Female patient    Overnight and events of the last 24hrs reviewed    Allergies    doxycycline (Stomach Upset; Nausea)  cats (Unknown)    Intolerances        ANTIBIOTICS/RELEVANT:  antimicrobials    immunologic:    OTHER:  acetaminophen     Tablet .. 650 milliGRAM(s) Oral every 6 hours PRN  benzonatate 100 milliGRAM(s) Oral three times a day  buPROPion XL (24-Hour) . 300 milliGRAM(s) Oral daily  citalopram 80 milliGRAM(s) Oral daily  dextrose 5%. 1000 milliLiter(s) IV Continuous <Continuous>  dextrose 5%. 1000 milliLiter(s) IV Continuous <Continuous>  dextrose 50% Injectable 25 Gram(s) IV Push once  dextrose 50% Injectable 25 Gram(s) IV Push once  dextrose 50% Injectable 12.5 Gram(s) IV Push once  dextrose Oral Gel 15 Gram(s) Oral once PRN  dicyclomine 10 milliGRAM(s) Oral three times a day before meals PRN  glucagon  Injectable 1 milliGRAM(s) IntraMuscular once  insulin lispro (ADMELOG) corrective regimen sliding scale   SubCutaneous three times a day before meals  lamoTRIgine 100 milliGRAM(s) Oral two times a day  lidocaine   4% Patch 1 Patch Transdermal daily  loratadine 10 milliGRAM(s) Oral daily  melatonin 5 milliGRAM(s) Oral at bedtime  metoclopramide 5 milliGRAM(s) Oral at bedtime  morphine  - Injectable 3 milliGRAM(s) IV Push every 6 hours PRN  morphine  - Injectable 1.5 milliGRAM(s) IV Push every 6 hours PRN  ondansetron Injectable 4 milliGRAM(s) IV Push every 6 hours PRN  pantoprazole  Injectable 40 milliGRAM(s) IV Push daily  simethicone 80 milliGRAM(s) Chew every 6 hours PRN  sodium chloride 0.9% with potassium chloride 40 mEq/L 1000 milliLiter(s) IV Continuous <Continuous>      Objective:  Vital Signs Last 24 Hrs  T(C): 37.1 (23 Dec 2023 11:43), Max: 37.1 (23 Dec 2023 11:43)  T(F): 98.8 (23 Dec 2023 11:43), Max: 98.8 (23 Dec 2023 11:43)  HR: 92 (23 Dec 2023 11:43) (82 - 92)  BP: 123/88 (23 Dec 2023 11:43) (97/66 - 128/82)  BP(mean): --  RR: 18 (23 Dec 2023 11:43) (17 - 18)  SpO2: 97% (23 Dec 2023 11:43) (95% - 97%)    Parameters below as of 23 Dec 2023 11:43  Patient On (Oxygen Delivery Method): room air        T(C): 37.1 (12-23-23 @ 11:43), Max: 37.3 (12-22-23 @ 12:07)  T(C): 37.1 (12-23-23 @ 11:43), Max: 37.3 (12-22-23 @ 12:07)  T(C): 37.1 (12-23-23 @ 11:43), Max: 37.5 (12-20-23 @ 12:02)    PHYSICAL EXAM:  HEENT: NC atraumatic  Neck: supple  Respiratory: no accessory muscle use, breathing comfortably  Cardiovascular: distant  Gastrointestinal: normal appearing, nondistended  Extremities: no clubbing, no cyanosis,        LABS:                          13.3   6.18  )-----------( 273      ( 23 Dec 2023 08:19 )             37.4       WBC  6.18 12-23 @ 08:19  6.54 12-22 @ 13:10  5.84 12-21 @ 07:10  6.00 12-20 @ 06:27  9.69 12-19 @ 12:35      12-23    141  |  110<H>  |  4<L>  ----------------------------<  149<H>  3.9   |  27  |  0.81    Ca    8.6      23 Dec 2023 08:19    TPro  7.1  /  Alb  3.7  /  TBili  0.6  /  DBili  x   /  AST  30  /  ALT  126<H>  /  AlkPhos  74  12-22      Creatinine: 0.81 mg/dL (12-23-23 @ 08:19)  Creatinine: 0.83 mg/dL (12-22-23 @ 13:10)  Creatinine: 0.71 mg/dL (12-21-23 @ 07:10)  Creatinine: 0.89 mg/dL (12-20-23 @ 06:27)  Creatinine: 0.95 mg/dL (12-19-23 @ 12:35)        Urinalysis Basic - ( 23 Dec 2023 08:19 )    Color: x / Appearance: x / SG: x / pH: x  Gluc: 149 mg/dL / Ketone: x  / Bili: x / Urobili: x   Blood: x / Protein: x / Nitrite: x   Leuk Esterase: x / RBC: x / WBC x   Sq Epi: x / Non Sq Epi: x / Bacteria: x            INFLAMMATORY MARKERS      MICROBIOLOGY:    GI PCR Panel: [facility]: GI Panel PCR evaluates for:  Campylobacter, Plesiomonas shigelloides, Salmonella, Vibrio, Yersinia  enterocolitica, Enteroaggregative Escherichia (EAEC), Enteropathogenic E.  coli (EPEC), Enterotoxigenic E. coli (ETEC), Shiga-like toxin producing  E.coli (STEC), E. coli O157, Shigella/Enteroinvasive E. coli (EIEC),  Adenovirus, Astrovirus, Norovirus, Rotavirus, Sapovirus, Cryptosporidium,  Cyclospora cayetanensis, Entamoeba histolytica, Giardia lamblia.  For culture and susceptibility reports refer to "reflex stool culture". (12.22.23 @ 16:56)        RADIOLOGY & ADDITIONAL STUDIES:  < from: CT Abdomen and Pelvis w/ IV Cont (12.22.23 @ 16:09) >    ACC: [id number] EXAM:  CT ABDOMEN AND PELVIS IC   ORDERED BY: DARRYL GARCIA     PROCEDURE DATE:  12/22/2023          INTERPRETATION:  CLINICAL INFORMATION: Severe abdominal pain. Pancolitis   on CT 2 days ago.    COMPARISON: Multiple prior exams,with most recent CT abdomen/pelvis   12/19/2023    CONTRAST/COMPLICATIONS:  IV Contrast: Omnipaque 350  90 cc administered   10 cc discarded  Oral Contrast: NONE  Complications: None reported at time of study completion    PROCEDURE:  CT of the Abdomen and Pelvis was performed.  Sagittal and coronal reformats were performed.    FINDINGS:  LOWER CHEST: Within normal limits.    LIVER: Within normal limits.  BILE DUCTS: Normal caliber.  GALLBLADDER: Cholecystectomy.  SPLEEN: Within normal limits.  PANCREAS: Within normal limits.  ADRENALS: Within normal limits.  KIDNEYS/URETERS: No renal stones or hydronephrosis.    BLADDER: Within normal limits.  REPRODUCTIVE ORGANS: Uterus and adnexa within normal limits.    BOWEL: No bowel obstruction. Previous colonic wall thickening is   improved. Normal appendix.  PERITONEUM: No ascites.  VESSELS: Within normal limits.  RETROPERITONEUM/LYMPH NODES: No lymphadenopathy.  ABDOMINAL WALL: Within normal limits.  BONES: No acute findings.    IMPRESSION:  Improved pancolitis. No new acute findings.

## 2023-12-23 NOTE — CHART NOTE - NSCHARTNOTEFT_GEN_A_CORE
RN called as Pt reporting severe abdominal pain. Reports trying bentyl, lidocaine patch with no resolution of symptoms. States morphine is the only thing that helps.     T(C): 37.2 (12-23-23 @ 13:53), Max: 37.2 (12-23-23 @ 13:53)  HR: 99 (12-23-23 @ 13:53) (82 - 99)  BP: 125/83 (12-23-23 @ 13:53) (97/66 - 128/82)  RR: 18 (12-23-23 @ 13:53) (17 - 18)  SpO2: 98% (12-23-23 @ 13:53) (95% - 98%)    GENERAL: appears to be in pain  EYES: sclera clear, eyes red, in tears   HEART: +tachycardic  GASTROINTESTINAL: +generalized TTP - greater in LUQ.   INTEGUMENT: good skin turgor. lidocaine patch in place over umbilicus.  MUSCULOSKELETAL: no clubbing or cyanosis, no obvious deformity  NEUROLOGIC: awake, alert, oriented x3,  PSYCHIATRIC: linear and logical thought process    PLAN  - CT abd/pelvis revealed resolving pancolitis.  - changed severe pain PRN morphine from [id number] to [id number] earlier today.  - Will give scheduled morphine earlier (5 hours)  - consult PM&R (Dr. Jaffe) for better pain management. RN called as Pt reporting severe abdominal pain. Reports trying bentyl, lidocaine patch with no resolution of symptoms. States morphine is the only thing that helps.     T(C): 37.2 (12-23-23 @ 13:53), Max: 37.2 (12-23-23 @ 13:53)  HR: 99 (12-23-23 @ 13:53) (82 - 99)  BP: 125/83 (12-23-23 @ 13:53) (97/66 - 128/82)  RR: 18 (12-23-23 @ 13:53) (17 - 18)  SpO2: 98% (12-23-23 @ 13:53) (95% - 98%)    GENERAL: appears to be in pain  EYES: sclera clear, eyes red, in tears   HEART: +tachycardic  GASTROINTESTINAL: +generalized TTP - greater in LUQ.   INTEGUMENT: good skin turgor. lidocaine patch in place over umbilicus.  MUSCULOSKELETAL: no clubbing or cyanosis, no obvious deformity  NEUROLOGIC: awake, alert, oriented x3,  PSYCHIATRIC: linear and logical thought process    PLAN  - CT abd/pelvis revealed resolving pancolitis.  - changed severe pain PRN morphine from [id number] to [id number] earlier today.  - Will give scheduled morphine earlier (5 hours)  - consult PM&R (Dr. Jaffe) for better pain management. RN called as Pt reporting severe abdominal pain. Reports trying bentyl, lidocaine patch with no resolution of symptoms. States morphine is the only thing that helps.     T(C): 37.2 (12-23-23 @ 13:53), Max: 37.2 (12-23-23 @ 13:53)  HR: 99 (12-23-23 @ 13:53) (82 - 99)  BP: 125/83 (12-23-23 @ 13:53) (97/66 - 128/82)  RR: 18 (12-23-23 @ 13:53) (17 - 18)  SpO2: 98% (12-23-23 @ 13:53) (95% - 98%)    GENERAL: appears to be in pain  EYES: sclera clear, eyes red, in tears   HEART: +tachycardic  GASTROINTESTINAL: +generalized TTP - greater in LUQ.   INTEGUMENT: good skin turgor. lidocaine patch in place over umbilicus.  MUSCULOSKELETAL: no clubbing or cyanosis, no obvious deformity  NEUROLOGIC: awake, alert, oriented x3,  PSYCHIATRIC: linear and logical thought process    PLAN  - CT abd/pelvis revealed resolving pancolitis.  - changed severe pain PRN morphine from [id number] to [id number] earlier today.  - Will give scheduled morphine earlier (5 hours)  - consult PM&R (Dr. Jaffe) for better pain management.      *** Changed morphine PRNs back to [id number] until Dr. Jaffe sees patient. RN called as Pt reporting severe abdominal pain. Reports trying bentyl, lidocaine patch with no resolution of symptoms. States morphine is the only thing that helps.     T(C): 37.2 (12-23-23 @ 13:53), Max: 37.2 (12-23-23 @ 13:53)  HR: 99 (12-23-23 @ 13:53) (82 - 99)  BP: 125/83 (12-23-23 @ 13:53) (97/66 - 128/82)  RR: 18 (12-23-23 @ 13:53) (17 - 18)  SpO2: 98% (12-23-23 @ 13:53) (95% - 98%)    GENERAL: appears to be in pain  EYES: sclera clear, eyes red, in tears   HEART: +tachycardic  GASTROINTESTINAL: +generalized TTP - greater in LUQ.   INTEGUMENT: good skin turgor. lidocaine patch in place over umbilicus.  MUSCULOSKELETAL: no clubbing or cyanosis, no obvious deformity  NEUROLOGIC: awake, alert, oriented x3,  PSYCHIATRIC: linear and logical thought process    PLAN  - CT abd/pelvis revealed resolving pancolitis.  - changed severe pain PRN morphine from [id number] to [id number] earlier today.  - Will give scheduled morphine earlier (5 hours)  - consult PM&R (Dr. Jaffe) for better pain management.      *** Changed morphine PRNs back to [id number] until Dr. Jaffe sees patient.

## 2023-12-23 NOTE — PROGRESS NOTE ADULT - ATTENDING COMMENTS
patient seen and examined at bedside with resident, agree with resident assessment and plan except the following:     ID eval noted, GI PCR negative, no leukocytosis, afebrile, will continue observe off antibiotic.   patient still c/o abd pain, patient states she had extensive work up out patient including EGD in Sep show Gastritis, MRI/Gastric emptying, ?EUS didn't have significant finding, GI eval noted : out patient colonoscopy advised. will wean off narcotics as no clear indication. added bentyl, lidoderm patch, QHS standing reglan to help symptom. patient seen and examined at bedside with resident, agree with resident assessment and plan except the following:     ID eval noted, GI PCR negative, no leukocytosis, afebrile, will continue observe off antibiotic.   patient still c/o abd pain, patient states she had extensive work up out patient including EGD in Sep show Gastritis, MRI/Gastric emptying, ?EUS didn't have significant finding, GI eval noted : out patient colonoscopy advised. will wean off narcotics as no clear indication. added bentyl, lidoderm patch, QHS standing reglan to help symptom.  advance diet as tolerated

## 2023-12-23 NOTE — PROGRESS NOTE ADULT - PROBLEM SELECTOR PLAN 7
#VTE ppx: continue scd's    #GI ppx: cont ppi    #Dispo: further plans pending clinical course - once tolerating diet and LFT's are downtrending, will plan for dc to home w/ outpatient GI - follow up with IBS specialist #VTE ppx: continue scd's    #GI ppx: cont ppi    #As per nurse, Pt has Hx of recurrent C.diff. Pt complaining of diarrhea. will keep on isolation precaution for now - pending ID recs.    #Dispo: further plans pending clinical course - once tolerating diet and LFT's are downtrending, will plan for dc to home w/ outpatient GI - follow up with IBS specialist #VTE ppx: continue scd's    #GI ppx: cont ppi      #Dispo: further plans pending clinical course - once tolerating diet and LFT's are downtrending, will plan for dc to home w/ outpatient GI - follow up with IBS specialist

## 2023-12-23 NOTE — PROGRESS NOTE ADULT - SUBJECTIVE AND OBJECTIVE BOX
Patient is a 38y old  Female who presents with a chief complaint of abd pain (23 Dec 2023 10:04)      INTERVAL HPI/OVERNIGHT EVENTS: Patient was seen and examined at bedside. No overnight events. Pt continues to report intolerance w/ foods. She reports even jello and pudding will exacerbate her abdominal pain. She states she has no issues w/ liquids. Pt states she has pain from gas that has been improved w/ simethicone. Admits diarrhea. Pt also admits to cough, runny nose, and sore throat taht she caught a few days ago. Denies headache, chest pain, shortness of breath.     MEDICATIONS  (STANDING):  benzonatate 100 milliGRAM(s) Oral three times a day  buPROPion XL (24-Hour) . 300 milliGRAM(s) Oral daily  citalopram 80 milliGRAM(s) Oral daily  dextrose 5%. 1000 milliLiter(s) (50 mL/Hr) IV Continuous <Continuous>  dextrose 5%. 1000 milliLiter(s) (100 mL/Hr) IV Continuous <Continuous>  dextrose 50% Injectable 25 Gram(s) IV Push once  dextrose 50% Injectable 25 Gram(s) IV Push once  dextrose 50% Injectable 12.5 Gram(s) IV Push once  glucagon  Injectable 1 milliGRAM(s) IntraMuscular once  insulin lispro (ADMELOG) corrective regimen sliding scale   SubCutaneous three times a day before meals  lamoTRIgine 100 milliGRAM(s) Oral two times a day  loratadine 10 milliGRAM(s) Oral daily  melatonin 5 milliGRAM(s) Oral at bedtime  pantoprazole  Injectable 40 milliGRAM(s) IV Push daily  sodium chloride 0.9% with potassium chloride 40 mEq/L 1000 milliLiter(s) (50 mL/Hr) IV Continuous <Continuous>    MEDICATIONS  (PRN):  acetaminophen     Tablet .. 650 milliGRAM(s) Oral every 6 hours PRN Temp greater or equal to 38C (100.4F), Mild Pain (1 - 3)  dextrose Oral Gel 15 Gram(s) Oral once PRN Blood Glucose LESS THAN 70 milliGRAM(s)/deciliter  dicyclomine 10 milliGRAM(s) Oral three times a day before meals PRN abdominal pain  metoclopramide Injectable 5 milliGRAM(s) IV Push every 8 hours PRN nausea and/or vomiting  morphine  - Injectable 3 milliGRAM(s) IV Push every 6 hours PRN Severe Pain (7 - 10)  morphine  - Injectable 1.5 milliGRAM(s) IV Push every 6 hours PRN Moderate Pain (4 - 6)  ondansetron Injectable 4 milliGRAM(s) IV Push every 6 hours PRN Nausea and/or Vomiting  simethicone 80 milliGRAM(s) Chew every 6 hours PRN Upset Stomach      Allergies    doxycycline (Stomach Upset; Nausea)  cats (Unknown)    Intolerances        REVIEW OF SYSTEMS:  CONSTITUTIONAL: No fever or chills  HEENT: admits sore throat. No headache  RESPIRATORY: admits cough. No wheezing, or shortness of breath  CARDIOVASCULAR: No chest pain, palpitations  GASTROINTESTINAL: admits abdominal pain. admits diarrhea. No nausea, vomiting  GENITOURINARY: No dysuria or hematuria  NEUROLOGICAL: no focal weakness or dizziness  MUSCULOSKELETAL: no myalgias     Vital Signs Last 24 Hrs  T(C): 37 (23 Dec 2023 04:43), Max: 37.3 (22 Dec 2023 12:07)  T(F): 98.6 (23 Dec 2023 04:43), Max: 99.1 (22 Dec 2023 12:07)  HR: 88 (23 Dec 2023 04:43) (81 - 88)  BP: 97/66 (23 Dec 2023 04:43) (97/66 - 128/82)  BP(mean): --  RR: 18 (23 Dec 2023 04:43) (17 - 18)  SpO2: 95% (23 Dec 2023 04:43) (95% - 96%)    Parameters below as of 23 Dec 2023 04:43  Patient On (Oxygen Delivery Method): room air        PHYSICAL EXAM:  GENERAL: NAD, resting in bed comfortably.  HEENT:  intermittent coughing and runny nose. anicteric, moist mucous membranes  CHEST/LUNG:  CTA b/l, no rales, wheezes, or rhonchi  HEART:  RRR, S1, S2  ABDOMEN: +TTP in all 4 quadrants - greatest around periumbilical region. BS+, soft, nondistended  MUSCULOSKELETAL: no edema, cyanosis, or calf tenderness  NEUROLOGIC: A&Ox3. answers questions and follows commands appropriately      LABS:                        13.3   6.18  )-----------( 273      ( 23 Dec 2023 08:19 )             37.4     CBC Full  -  ( 23 Dec 2023 08:19 )  WBC Count : 6.18 K/uL  Hemoglobin : 13.3 g/dL  Hematocrit : 37.4 %  Platelet Count - Automated : 273 K/uL  Mean Cell Volume : 88.6 fl  Mean Cell Hemoglobin : 31.5 pg  Mean Cell Hemoglobin Concentration : 35.6 gm/dL  Auto Neutrophil # : x  Auto Lymphocyte # : x  Auto Monocyte # : x  Auto Eosinophil # : x  Auto Basophil # : x  Auto Neutrophil % : x  Auto Lymphocyte % : x  Auto Monocyte % : x  Auto Eosinophil % : x  Auto Basophil % : x    23 Dec 2023 08:19    141    |  110    |  4      ----------------------------<  149    3.9     |  27     |  0.81     Ca    8.6        23 Dec 2023 08:19    TPro  7.1    /  Alb  3.7    /  TBili  0.6    /  DBili  x      /  AST  30     /  ALT  126    /  AlkPhos  74     22 Dec 2023 13:10      Urinalysis Basic - ( 23 Dec 2023 08:19 )    Color: x / Appearance: x / SG: x / pH: x  Gluc: 149 mg/dL / Ketone: x  / Bili: x / Urobili: x   Blood: x / Protein: x / Nitrite: x   Leuk Esterase: x / RBC: x / WBC x   Sq Epi: x / Non Sq Epi: x / Bacteria: x      CAPILLARY BLOOD GLUCOSE            Culture - Urine (collected 12-19-23 @ 12:35)  Source: Clean Catch Clean Catch (Midstream)  Final Report (12-20-23 @ 23:13):    <10,000 CFU/mL Normal Urogenital Batsheva        RADIOLOGY & ADDITIONAL TESTS:  < from: CT Abdomen and Pelvis w/ IV Cont (12.22.23 @ 16:09) >  IMPRESSION:  Improved pancolitis. No new acute findings.    < end of copied text >    Personally reviewed.     Consultant(s) Notes Reviewed:  [x] YES  [ ] NO

## 2023-12-24 LAB
ALBUMIN SERPL ELPH-MCNC: 3.6 G/DL — SIGNIFICANT CHANGE UP (ref 3.3–5)
ALBUMIN SERPL ELPH-MCNC: 3.6 G/DL — SIGNIFICANT CHANGE UP (ref 3.3–5)
ALP SERPL-CCNC: 64 U/L — SIGNIFICANT CHANGE UP (ref 40–120)
ALP SERPL-CCNC: 64 U/L — SIGNIFICANT CHANGE UP (ref 40–120)
ALT FLD-CCNC: 85 U/L — HIGH (ref 12–78)
ALT FLD-CCNC: 85 U/L — HIGH (ref 12–78)
ANION GAP SERPL CALC-SCNC: 6 MMOL/L — SIGNIFICANT CHANGE UP (ref 5–17)
ANION GAP SERPL CALC-SCNC: 6 MMOL/L — SIGNIFICANT CHANGE UP (ref 5–17)
AST SERPL-CCNC: 23 U/L — SIGNIFICANT CHANGE UP (ref 15–37)
AST SERPL-CCNC: 23 U/L — SIGNIFICANT CHANGE UP (ref 15–37)
BILIRUB SERPL-MCNC: 0.7 MG/DL — SIGNIFICANT CHANGE UP (ref 0.2–1.2)
BILIRUB SERPL-MCNC: 0.7 MG/DL — SIGNIFICANT CHANGE UP (ref 0.2–1.2)
BUN SERPL-MCNC: 7 MG/DL — SIGNIFICANT CHANGE UP (ref 7–23)
BUN SERPL-MCNC: 7 MG/DL — SIGNIFICANT CHANGE UP (ref 7–23)
CALCIUM SERPL-MCNC: 9.1 MG/DL — SIGNIFICANT CHANGE UP (ref 8.5–10.1)
CALCIUM SERPL-MCNC: 9.1 MG/DL — SIGNIFICANT CHANGE UP (ref 8.5–10.1)
CHLORIDE SERPL-SCNC: 106 MMOL/L — SIGNIFICANT CHANGE UP (ref 96–108)
CHLORIDE SERPL-SCNC: 106 MMOL/L — SIGNIFICANT CHANGE UP (ref 96–108)
CO2 SERPL-SCNC: 29 MMOL/L — SIGNIFICANT CHANGE UP (ref 22–31)
CO2 SERPL-SCNC: 29 MMOL/L — SIGNIFICANT CHANGE UP (ref 22–31)
CREAT SERPL-MCNC: 0.89 MG/DL — SIGNIFICANT CHANGE UP (ref 0.5–1.3)
CREAT SERPL-MCNC: 0.89 MG/DL — SIGNIFICANT CHANGE UP (ref 0.5–1.3)
EGFR: 85 ML/MIN/1.73M2 — SIGNIFICANT CHANGE UP
EGFR: 85 ML/MIN/1.73M2 — SIGNIFICANT CHANGE UP
GLUCOSE SERPL-MCNC: 119 MG/DL — HIGH (ref 70–99)
GLUCOSE SERPL-MCNC: 119 MG/DL — HIGH (ref 70–99)
HCT VFR BLD CALC: 38.1 % — SIGNIFICANT CHANGE UP (ref 34.5–45)
HCT VFR BLD CALC: 38.1 % — SIGNIFICANT CHANGE UP (ref 34.5–45)
HGB BLD-MCNC: 13.6 G/DL — SIGNIFICANT CHANGE UP (ref 11.5–15.5)
HGB BLD-MCNC: 13.6 G/DL — SIGNIFICANT CHANGE UP (ref 11.5–15.5)
MAGNESIUM SERPL-MCNC: 2.1 MG/DL — SIGNIFICANT CHANGE UP (ref 1.6–2.6)
MAGNESIUM SERPL-MCNC: 2.1 MG/DL — SIGNIFICANT CHANGE UP (ref 1.6–2.6)
MCHC RBC-ENTMCNC: 31.9 PG — SIGNIFICANT CHANGE UP (ref 27–34)
MCHC RBC-ENTMCNC: 31.9 PG — SIGNIFICANT CHANGE UP (ref 27–34)
MCHC RBC-ENTMCNC: 35.7 GM/DL — SIGNIFICANT CHANGE UP (ref 32–36)
MCHC RBC-ENTMCNC: 35.7 GM/DL — SIGNIFICANT CHANGE UP (ref 32–36)
MCV RBC AUTO: 89.2 FL — SIGNIFICANT CHANGE UP (ref 80–100)
MCV RBC AUTO: 89.2 FL — SIGNIFICANT CHANGE UP (ref 80–100)
NRBC # BLD: 0 /100 WBCS — SIGNIFICANT CHANGE UP (ref 0–0)
NRBC # BLD: 0 /100 WBCS — SIGNIFICANT CHANGE UP (ref 0–0)
PHOSPHATE SERPL-MCNC: 4.5 MG/DL — SIGNIFICANT CHANGE UP (ref 2.5–4.5)
PHOSPHATE SERPL-MCNC: 4.5 MG/DL — SIGNIFICANT CHANGE UP (ref 2.5–4.5)
PLATELET # BLD AUTO: 286 K/UL — SIGNIFICANT CHANGE UP (ref 150–400)
PLATELET # BLD AUTO: 286 K/UL — SIGNIFICANT CHANGE UP (ref 150–400)
POTASSIUM SERPL-MCNC: 4 MMOL/L — SIGNIFICANT CHANGE UP (ref 3.5–5.3)
POTASSIUM SERPL-MCNC: 4 MMOL/L — SIGNIFICANT CHANGE UP (ref 3.5–5.3)
POTASSIUM SERPL-SCNC: 4 MMOL/L — SIGNIFICANT CHANGE UP (ref 3.5–5.3)
POTASSIUM SERPL-SCNC: 4 MMOL/L — SIGNIFICANT CHANGE UP (ref 3.5–5.3)
PROT SERPL-MCNC: 6.9 G/DL — SIGNIFICANT CHANGE UP (ref 6–8.3)
PROT SERPL-MCNC: 6.9 G/DL — SIGNIFICANT CHANGE UP (ref 6–8.3)
RBC # BLD: 4.27 M/UL — SIGNIFICANT CHANGE UP (ref 3.8–5.2)
RBC # BLD: 4.27 M/UL — SIGNIFICANT CHANGE UP (ref 3.8–5.2)
RBC # FLD: 12.1 % — SIGNIFICANT CHANGE UP (ref 10.3–14.5)
RBC # FLD: 12.1 % — SIGNIFICANT CHANGE UP (ref 10.3–14.5)
SODIUM SERPL-SCNC: 141 MMOL/L — SIGNIFICANT CHANGE UP (ref 135–145)
SODIUM SERPL-SCNC: 141 MMOL/L — SIGNIFICANT CHANGE UP (ref 135–145)
WBC # BLD: 7.97 K/UL — SIGNIFICANT CHANGE UP (ref 3.8–10.5)
WBC # BLD: 7.97 K/UL — SIGNIFICANT CHANGE UP (ref 3.8–10.5)
WBC # FLD AUTO: 7.97 K/UL — SIGNIFICANT CHANGE UP (ref 3.8–10.5)
WBC # FLD AUTO: 7.97 K/UL — SIGNIFICANT CHANGE UP (ref 3.8–10.5)

## 2023-12-24 PROCEDURE — 99233 SBSQ HOSP IP/OBS HIGH 50: CPT

## 2023-12-24 RX ORDER — MORPHINE SULFATE 50 MG/1
1 CAPSULE, EXTENDED RELEASE ORAL EVERY 4 HOURS
Refills: 0 | Status: DISCONTINUED | OUTPATIENT
Start: 2023-12-24 | End: 2023-12-24

## 2023-12-24 RX ORDER — MORPHINE SULFATE 50 MG/1
1 CAPSULE, EXTENDED RELEASE ORAL
Refills: 0 | Status: DISCONTINUED | OUTPATIENT
Start: 2023-12-24 | End: 2023-12-24

## 2023-12-24 RX ORDER — MORPHINE SULFATE 50 MG/1
1 CAPSULE, EXTENDED RELEASE ORAL EVERY 6 HOURS
Refills: 0 | Status: DISCONTINUED | OUTPATIENT
Start: 2023-12-24 | End: 2023-12-25

## 2023-12-24 RX ORDER — MORPHINE SULFATE 50 MG/1
2 CAPSULE, EXTENDED RELEASE ORAL EVERY 6 HOURS
Refills: 0 | Status: DISCONTINUED | OUTPATIENT
Start: 2023-12-24 | End: 2023-12-25

## 2023-12-24 RX ORDER — MORPHINE SULFATE 50 MG/1
2 CAPSULE, EXTENDED RELEASE ORAL EVERY 4 HOURS
Refills: 0 | Status: DISCONTINUED | OUTPATIENT
Start: 2023-12-24 | End: 2023-12-24

## 2023-12-24 RX ORDER — SACCHAROMYCES BOULARDII 250 MG
250 POWDER IN PACKET (EA) ORAL
Refills: 0 | Status: DISCONTINUED | OUTPATIENT
Start: 2023-12-24 | End: 2023-12-25

## 2023-12-24 RX ADMIN — BUPROPION HYDROCHLORIDE 300 MILLIGRAM(S): 150 TABLET, EXTENDED RELEASE ORAL at 09:19

## 2023-12-24 RX ADMIN — PANTOPRAZOLE SODIUM 40 MILLIGRAM(S): 20 TABLET, DELAYED RELEASE ORAL at 09:19

## 2023-12-24 RX ADMIN — MORPHINE SULFATE 3 MILLIGRAM(S): 50 CAPSULE, EXTENDED RELEASE ORAL at 19:34

## 2023-12-24 RX ADMIN — MORPHINE SULFATE 3 MILLIGRAM(S): 50 CAPSULE, EXTENDED RELEASE ORAL at 13:07

## 2023-12-24 RX ADMIN — LAMOTRIGINE 100 MILLIGRAM(S): 25 TABLET, ORALLY DISINTEGRATING ORAL at 21:27

## 2023-12-24 RX ADMIN — CITALOPRAM 80 MILLIGRAM(S): 10 TABLET, FILM COATED ORAL at 09:20

## 2023-12-24 RX ADMIN — MORPHINE SULFATE 3 MILLIGRAM(S): 50 CAPSULE, EXTENDED RELEASE ORAL at 08:37

## 2023-12-24 RX ADMIN — MORPHINE SULFATE 3 MILLIGRAM(S): 50 CAPSULE, EXTENDED RELEASE ORAL at 12:52

## 2023-12-24 RX ADMIN — MORPHINE SULFATE 3 MILLIGRAM(S): 50 CAPSULE, EXTENDED RELEASE ORAL at 09:44

## 2023-12-24 RX ADMIN — Medication 100 MILLIGRAM(S): at 21:27

## 2023-12-24 RX ADMIN — Medication 250 MILLIGRAM(S): at 19:35

## 2023-12-24 RX ADMIN — Medication 100 MILLIGRAM(S): at 14:02

## 2023-12-24 RX ADMIN — MORPHINE SULFATE 3 MILLIGRAM(S): 50 CAPSULE, EXTENDED RELEASE ORAL at 03:57

## 2023-12-24 RX ADMIN — Medication 5 MILLIGRAM(S): at 22:10

## 2023-12-24 RX ADMIN — LIDOCAINE 1 PATCH: 4 CREAM TOPICAL at 00:00

## 2023-12-24 RX ADMIN — LAMOTRIGINE 100 MILLIGRAM(S): 25 TABLET, ORALLY DISINTEGRATING ORAL at 09:20

## 2023-12-24 RX ADMIN — Medication 100 MILLIGRAM(S): at 09:20

## 2023-12-24 RX ADMIN — LORATADINE 10 MILLIGRAM(S): 10 TABLET ORAL at 11:42

## 2023-12-24 RX ADMIN — Medication 30 MILLILITER(S): at 21:27

## 2023-12-24 RX ADMIN — Medication 5 MILLIGRAM(S): at 21:27

## 2023-12-24 RX ADMIN — Medication 30 MILLILITER(S): at 12:52

## 2023-12-24 NOTE — PROGRESS NOTE ADULT - PROBLEM SELECTOR PLAN 7
#VTE ppx: continue scd's    #GI ppx: cont ppi      #Dispo: further plans pending clinical course - once tolerating diet and LFT's are downtrending, will plan for dc to home w/ outpatient GI - follow up with IBS specialist

## 2023-12-24 NOTE — PROGRESS NOTE ADULT - SUBJECTIVE AND OBJECTIVE BOX
Patient is a 38y old  Female who presents with a chief complaint of abd pain (24 Dec 2023 11:28)      Subjective:  INTERVAL HPI/OVERNIGHT EVENTS: Patient seen and examined at bedside.  Patient states she is feeling overall better   MEDICATIONS  (STANDING):  benzonatate 100 milliGRAM(s) Oral three times a day  buPROPion XL (24-Hour) . 300 milliGRAM(s) Oral daily  citalopram 80 milliGRAM(s) Oral daily  dextrose 5%. 1000 milliLiter(s) (50 mL/Hr) IV Continuous <Continuous>  dextrose 5%. 1000 milliLiter(s) (100 mL/Hr) IV Continuous <Continuous>  dextrose 50% Injectable 25 Gram(s) IV Push once  dextrose 50% Injectable 25 Gram(s) IV Push once  dextrose 50% Injectable 12.5 Gram(s) IV Push once  glucagon  Injectable 1 milliGRAM(s) IntraMuscular once  insulin lispro (ADMELOG) corrective regimen sliding scale   SubCutaneous three times a day before meals  lamoTRIgine 100 milliGRAM(s) Oral two times a day  lidocaine   4% Patch 1 Patch Transdermal daily  loratadine 10 milliGRAM(s) Oral daily  melatonin 5 milliGRAM(s) Oral at bedtime  metoclopramide 5 milliGRAM(s) Oral at bedtime  pantoprazole  Injectable 40 milliGRAM(s) IV Push daily  saccharomyces boulardii 250 milliGRAM(s) Oral two times a day    MEDICATIONS  (PRN):  acetaminophen     Tablet .. 650 milliGRAM(s) Oral every 6 hours PRN Temp greater or equal to 38C (100.4F), Mild Pain (1 - 3)  aluminum hydroxide/magnesium hydroxide/simethicone Suspension 30 milliLiter(s) Oral every 6 hours PRN Dyspepsia  dextrose Oral Gel 15 Gram(s) Oral once PRN Blood Glucose LESS THAN 70 milliGRAM(s)/deciliter  dicyclomine 10 milliGRAM(s) Oral three times a day before meals PRN abdominal pain  metoclopramide Injectable 10 milliGRAM(s) IV Push every 8 hours PRN for nausea/vomit  morphine  - Injectable 3 milliGRAM(s) IV Push every 4 hours PRN Severe Pain (7 - 10)  morphine  - Injectable 1.5 milliGRAM(s) IV Push every 4 hours PRN Moderate Pain (4 - 6)  simethicone 80 milliGRAM(s) Chew every 6 hours PRN Upset Stomach      Allergies    doxycycline (Stomach Upset; Nausea)  cats (Unknown)    Intolerances        REVIEW OF SYSTEMS:  CONSTITUTIONAL: No fever or chills  HEENT:  No headache, no sore throat  RESPIRATORY: No cough or shortness of breath  CARDIOVASCULAR: No chest pain or palpitations  GASTROINTESTINAL: No abd pain, nausea, vomiting, or diarrhea      Objective:  Vital Signs Last 24 Hrs  T(C): 37.1 (24 Dec 2023 14:36), Max: 37.3 (23 Dec 2023 20:35)  T(F): 98.7 (24 Dec 2023 14:36), Max: 99.2 (23 Dec 2023 20:35)  HR: 90 (24 Dec 2023 14:36) (85 - 93)  BP: 110/77 (24 Dec 2023 14:36) (101/68 - 124/84)  BP(mean): --  RR: 18 (24 Dec 2023 14:36) (18 - 18)  SpO2: 91% (24 Dec 2023 14:36) (91% - 93%)    Parameters below as of 24 Dec 2023 14:36  Patient On (Oxygen Delivery Method): room air        GENERAL: NAD, lying in bed comfortably  HEAD:  Normocephalic  EYES:  conjunctiva and sclera clear  ENT: Moist mucous membranes  NECK: Supple  CHEST/LUNG: Clear to auscultation bilaterally; No rales or rhonchi; no wheezing. Unlabored respirations  HEART: Regular rate and rhythm; S1S2+  ABDOMEN: Bowel sounds present; Soft, mild tender, Nondistended.   EXTREMITIES:  + distal Peripheral Pulses;  No cyanosis, or edema  NERVOUS SYSTEM:  Alert & Oriented X3;  No gross focal deficits   MSK: moves all extremities  SKIN: No rashes     LABS:                        13.6   7.97  )-----------( 286      ( 24 Dec 2023 05:15 )             38.1     24 Dec 2023 05:15    141    |  106    |  7      ----------------------------<  119    4.0     |  29     |  0.89     Ca    9.1        24 Dec 2023 05:15  Phos  4.5       24 Dec 2023 05:15  Mg     2.1       24 Dec 2023 05:15    TPro  6.9    /  Alb  3.6    /  TBili  0.7    /  DBili  x      /  AST  23     /  ALT  85     /  AlkPhos  64     24 Dec 2023 05:15      Urinalysis Basic - ( 24 Dec 2023 05:15 )    Color: x / Appearance: x / SG: x / pH: x  Gluc: 119 mg/dL / Ketone: x  / Bili: x / Urobili: x   Blood: x / Protein: x / Nitrite: x   Leuk Esterase: x / RBC: x / WBC x   Sq Epi: x / Non Sq Epi: x / Bacteria: x      CAPILLARY BLOOD GLUCOSE            Culture - Urine (collected 12-19-23 @ 12:35)  Source: Clean Catch Clean Catch (Midstream)  Final Report (12-20-23 @ 23:13):    <10,000 CFU/mL Normal Urogenital Batsheva        RADIOLOGY & ADDITIONAL TESTS:    Personally reviewed.     Consultant(s) Notes Reviewed:  [x] YES  [ ] NO    Plan of care discussed with patient,  all questions answered

## 2023-12-24 NOTE — PROGRESS NOTE ADULT - PROBLEM SELECTOR PROBLEM 4
Borderline personality disorder
details…

## 2023-12-24 NOTE — PROGRESS NOTE ADULT - ASSESSMENT
Pt is a 38W w/ PMHx of IBS, diabetic, depression, anxiety, borderline personality disorder, current recurrent UTIs presents w/ abdominal pain, recurrent C diff.    Patient reports she has had vomiting and diarrhea for the past week PTA.  Patient then developed periumbilical abdominal pain described as sharp stabbing.   CT w/ Acute uncomplicated pancolitis  ID c/s for diarrhea  was on Abx therapy w/ cipro/flagyl 12/19, discontinued 12/20    Recommendations:   GI-PCR neg  Improved pancolitis without specific Rx  obs off abx    Thank you for consulting us and involving us in the management of this most interesting and challenging case.  Please call us at [phone] or text me directly on my cell#[phone] with any concerns or further questions.       Pt is a 38W w/ PMHx of IBS, diabetic, depression, anxiety, borderline personality disorder, current recurrent UTIs presents w/ abdominal pain, recurrent C diff.    Patient reports she has had vomiting and diarrhea for the past week PTA.  Patient then developed periumbilical abdominal pain described as sharp stabbing.   CT w/ Acute uncomplicated pancolitis  ID c/s for diarrhea  was on Abx therapy w/ cipro/flagyl 12/19, discontinued 12/20    Recommendations:   GI-PCR neg  Improved pancolitis without specific Rx  obs off abx    Thank you for consulting us and involving us in the management of this most interesting and challenging case.  Please call us at [phone] or text me directly on my cell#[phone] with any concerns or further questions.

## 2023-12-24 NOTE — PROGRESS NOTE ADULT - ASSESSMENT
A/P 38y year old Female with abdominal pain due to pan colitis.    As patients CT scan shows improve colitis, will begin tapering down dosing and frequency of patients morphine IV.  Revising from 3 and 1.5mg IV Q4H PRN to 2 and 1mg IV Q6H PRN.    Primary team notes are reviewed  Discussed management/coordinated care with primary team/referring provider.  High risk of morbidity from treatment with: schedule II narcotic pain medications

## 2023-12-24 NOTE — PROGRESS NOTE ADULT - SUBJECTIVE AND OBJECTIVE BOX
INTERVAL HPI/OVERNIGHT EVENTS:  Pt seen and examined   Reports having a lot of gas pain   Tolerating diet better today but still feeling nauseous at times  No vomiting         MEDICATIONS  (STANDING):  benzonatate 100 milliGRAM(s) Oral three times a day  buPROPion XL (24-Hour) . 300 milliGRAM(s) Oral daily  citalopram 80 milliGRAM(s) Oral daily  dextrose 5%. 1000 milliLiter(s) (50 mL/Hr) IV Continuous <Continuous>  dextrose 5%. 1000 milliLiter(s) (100 mL/Hr) IV Continuous <Continuous>  dextrose 50% Injectable 25 Gram(s) IV Push once  dextrose 50% Injectable 25 Gram(s) IV Push once  dextrose 50% Injectable 12.5 Gram(s) IV Push once  glucagon  Injectable 1 milliGRAM(s) IntraMuscular once  insulin lispro (ADMELOG) corrective regimen sliding scale   SubCutaneous three times a day before meals  lamoTRIgine 100 milliGRAM(s) Oral two times a day  lidocaine   4% Patch 1 Patch Transdermal daily  loratadine 10 milliGRAM(s) Oral daily  melatonin 5 milliGRAM(s) Oral at bedtime  metoclopramide 5 milliGRAM(s) Oral at bedtime  pantoprazole  Injectable 40 milliGRAM(s) IV Push daily  saccharomyces boulardii 250 milliGRAM(s) Oral two times a day    MEDICATIONS  (PRN):  acetaminophen     Tablet .. 650 milliGRAM(s) Oral every 6 hours PRN Temp greater or equal to 38C (100.4F), Mild Pain (1 - 3)  aluminum hydroxide/magnesium hydroxide/simethicone Suspension 30 milliLiter(s) Oral every 6 hours PRN Dyspepsia  dextrose Oral Gel 15 Gram(s) Oral once PRN Blood Glucose LESS THAN 70 milliGRAM(s)/deciliter  dicyclomine 10 milliGRAM(s) Oral three times a day before meals PRN abdominal pain  metoclopramide Injectable 10 milliGRAM(s) IV Push every 8 hours PRN for nausea/vomit  morphine  - Injectable 3 milliGRAM(s) IV Push every 4 hours PRN Severe Pain (7 - 10)  morphine  - Injectable 1.5 milliGRAM(s) IV Push every 4 hours PRN Moderate Pain (4 - 6)  simethicone 80 milliGRAM(s) Chew every 6 hours PRN Upset Stomach    REVIEW OF SYSTEMS:  CONSTITUTIONAL: No fever or chills  HEENT:  No headache, no sore throat  RESPIRATORY: No cough, wheezing, or shortness of breath  CARDIOVASCULAR: No chest pain, palpitations, or leg swelling  GASTROINTESTINAL: + abd pain, +nausea, No vomiting, or + diarrhea  GENITOURINARY: No dysuria, frequency, or hematuria  NEUROLOGICAL: no focal weakness or dizziness  MUSCULOSKELETAL: no myalgias       Allergies  doxycycline (Stomach Upset; Nausea)  cats (Unknown)    Intolerances      Vital Signs Last 24 Hrs  T(C): 36.7 (24 Dec 2023 04:56), Max: 37.3 (23 Dec 2023 20:35)  T(F): 98.1 (24 Dec 2023 04:56), Max: 99.2 (23 Dec 2023 20:35)  HR: 85 (24 Dec 2023 04:56) (85 - 99)  BP: 101/68 (24 Dec 2023 04:56) (101/68 - 125/83)  BP(mean): --  RR: 18 (24 Dec 2023 04:56) (18 - 18)  SpO2: 93% (24 Dec 2023 04:56) (93% - 98%)    Parameters below as of 24 Dec 2023 04:56  Patient On (Oxygen Delivery Method): room air      PHYSICAL EXAM:  GENERAL: NAD  HEENT:  NC/AT, EOMI, moist mucous membranes  CHEST/LUNG:  CTA b/l, no rales, wheezes, or rhonchi  HEART:  RRR, S1, S2  ABDOMEN:  BS+, soft, nontender, nondistended  EXTREMITIES: no edema, cyanosis, or calf tenderness  NERVOUS SYSTEM: AA&Ox3      LABS:                        13.6   7.97  )-----------( 286      ( 24 Dec 2023 05:15 )             38.1     12-24    141  |  106  |  7   ----------------------------<  119<H>  4.0   |  29  |  0.89    Ca    9.1      24 Dec 2023 05:15  Phos  4.5     12-24  Mg     2.1     12-24    TPro  6.9  /  Alb  3.6  /  TBili  0.7  /  DBili  x   /  AST  23  /  ALT  85<H>  /  AlkPhos  64  12-24

## 2023-12-24 NOTE — PROGRESS NOTE ADULT - SUBJECTIVE AND OBJECTIVE BOX
[facility] DIVISION of INFECTIOUS DISEASE  Toan Hernandez MD PhD, Eli Fernandes MD, Zuleima Rene MD, Roge James MD, Hal Dobbins MD  and providing coverage with Jenise Mason MD  Providing Infectious Disease Consultations at [facility], [facility]'s    Office# [phone] to schedule follow up appointments  Answering Service for urgent calls or New Consults [phone]  Cell# to text for urgent issues Toan Hernandez [phone]     infectious diseases progress note:    HARLEEN NUNES is a 38y y. o. Female patient    Overnight and events of the last 24hrs reviewed    Allergies    doxycycline (Stomach Upset; Nausea)  cats (Unknown)    Intolerances        ANTIBIOTICS/RELEVANT:  antimicrobials    immunologic:    OTHER:  acetaminophen     Tablet .. 650 milliGRAM(s) Oral every 6 hours PRN  aluminum hydroxide/magnesium hydroxide/simethicone Suspension 30 milliLiter(s) Oral every 6 hours PRN  benzonatate 100 milliGRAM(s) Oral three times a day  buPROPion XL (24-Hour) . 300 milliGRAM(s) Oral daily  citalopram 80 milliGRAM(s) Oral daily  dextrose 5%. 1000 milliLiter(s) IV Continuous <Continuous>  dextrose 5%. 1000 milliLiter(s) IV Continuous <Continuous>  dextrose 50% Injectable 25 Gram(s) IV Push once  dextrose 50% Injectable 25 Gram(s) IV Push once  dextrose 50% Injectable 12.5 Gram(s) IV Push once  dextrose Oral Gel 15 Gram(s) Oral once PRN  dicyclomine 10 milliGRAM(s) Oral three times a day before meals PRN  glucagon  Injectable 1 milliGRAM(s) IntraMuscular once  insulin lispro (ADMELOG) corrective regimen sliding scale   SubCutaneous three times a day before meals  lamoTRIgine 100 milliGRAM(s) Oral two times a day  lidocaine   4% Patch 1 Patch Transdermal daily  loratadine 10 milliGRAM(s) Oral daily  melatonin 5 milliGRAM(s) Oral at bedtime  metoclopramide 5 milliGRAM(s) Oral at bedtime  metoclopramide Injectable 10 milliGRAM(s) IV Push every 8 hours PRN  morphine  - Injectable 3 milliGRAM(s) IV Push every 4 hours PRN  morphine  - Injectable 1.5 milliGRAM(s) IV Push every 4 hours PRN  pantoprazole  Injectable 40 milliGRAM(s) IV Push daily  saccharomyces boulardii 250 milliGRAM(s) Oral two times a day  simethicone 80 milliGRAM(s) Chew every 6 hours PRN      Objective:  Vital Signs Last 24 Hrs  T(C): 36.7 (24 Dec 2023 04:56), Max: 37.3 (23 Dec 2023 20:35)  T(F): 98.1 (24 Dec 2023 04:56), Max: 99.2 (23 Dec 2023 20:35)  HR: 85 (24 Dec 2023 04:56) (85 - 99)  BP: 101/68 (24 Dec 2023 04:56) (101/68 - 125/83)  BP(mean): --  RR: 18 (24 Dec 2023 04:56) (18 - 18)  SpO2: 93% (24 Dec 2023 04:56) (93% - 98%)    Parameters below as of 24 Dec 2023 04:56  Patient On (Oxygen Delivery Method): room air        T(C): 36.7 (12-24-23 @ 04:56), Max: 37.3 (12-22-23 @ 12:07)  T(C): 36.7 (12-24-23 @ 04:56), Max: 37.3 (12-22-23 @ 12:07)  T(C): 36.7 (12-24-23 @ 04:56), Max: 37.5 (12-20-23 @ 12:02)    PHYSICAL EXAM:  HEENT: NC atraumatic  Neck: supple  Respiratory: no accessory muscle use, breathing comfortably  Cardiovascular: distant  Gastrointestinal: normal appearing, nondistended  Extremities: no clubbing, no cyanosis,        LABS:                          13.6   7.97  )-----------( 286      ( 24 Dec 2023 05:15 )             38.1       WBC  7.97 12-24 @ 05:15  6.18 12-23 @ 08:19  6.54 12-22 @ 13:10  5.84 12-21 @ 07:10  6.00 12-20 @ 06:27  9.69 12-19 @ 12:35      12-24    141  |  106  |  7   ----------------------------<  119<H>  4.0   |  29  |  0.89    Ca    9.1      24 Dec 2023 05:15  Phos  4.5     12-24  Mg     2.1     12-24    TPro  6.9  /  Alb  3.6  /  TBili  0.7  /  DBili  x   /  AST  23  /  ALT  85<H>  /  AlkPhos  64  12-24      Creatinine: 0.89 mg/dL (12-24-23 @ 05:15)  Creatinine: 0.81 mg/dL (12-23-23 @ 08:19)  Creatinine: 0.83 mg/dL (12-22-23 @ 13:10)  Creatinine: 0.71 mg/dL (12-21-23 @ 07:10)  Creatinine: 0.89 mg/dL (12-20-23 @ 06:27)  Creatinine: 0.95 mg/dL (12-19-23 @ 12:35)        Urinalysis Basic - ( 24 Dec 2023 05:15 )    Color: x / Appearance: x / SG: x / pH: x  Gluc: 119 mg/dL / Ketone: x  / Bili: x / Urobili: x   Blood: x / Protein: x / Nitrite: x   Leuk Esterase: x / RBC: x / WBC x   Sq Epi: x / Non Sq Epi: x / Bacteria: x            INFLAMMATORY MARKERS      MICROBIOLOGY:              RADIOLOGY & ADDITIONAL STUDIES:   [facility] DIVISION of INFECTIOUS DISEASE  Toan Hernandez MD PhD, Eli Fernandes MD, Zuleima Rene MD, Roge James MD, Hal Dobbins MD  and providing coverage with Jenise Mason MD  Providing Infectious Disease Consultations at [facility], [facility]'s    Office# [phone] to schedule follow up appointments  Answering Service for urgent calls or New Consults [phone]  Cell# to text for urgent issues Toan Hernandez [phone]     infectious diseases progress note:    HARLEEN NUNES is a 38y y. o. Female patient    Overnight and events of the last 24hrs reviewed    Allergies    doxycycline (Stomach Upset; Nausea)  cats (Unknown)    Intolerances        ANTIBIOTICS/RELEVANT:  antimicrobials    immunologic:    OTHER:  acetaminophen     Tablet .. 650 milliGRAM(s) Oral every 6 hours PRN  aluminum hydroxide/magnesium hydroxide/simethicone Suspension 30 milliLiter(s) Oral every 6 hours PRN  benzonatate 100 milliGRAM(s) Oral three times a day  buPROPion XL (24-Hour) . 300 milliGRAM(s) Oral daily  citalopram 80 milliGRAM(s) Oral daily  dextrose 5%. 1000 milliLiter(s) IV Continuous <Continuous>  dextrose 5%. 1000 milliLiter(s) IV Continuous <Continuous>  dextrose 50% Injectable 25 Gram(s) IV Push once  dextrose 50% Injectable 25 Gram(s) IV Push once  dextrose 50% Injectable 12.5 Gram(s) IV Push once  dextrose Oral Gel 15 Gram(s) Oral once PRN  dicyclomine 10 milliGRAM(s) Oral three times a day before meals PRN  glucagon  Injectable 1 milliGRAM(s) IntraMuscular once  insulin lispro (ADMELOG) corrective regimen sliding scale   SubCutaneous three times a day before meals  lamoTRIgine 100 milliGRAM(s) Oral two times a day  lidocaine   4% Patch 1 Patch Transdermal daily  loratadine 10 milliGRAM(s) Oral daily  melatonin 5 milliGRAM(s) Oral at bedtime  metoclopramide 5 milliGRAM(s) Oral at bedtime  metoclopramide Injectable 10 milliGRAM(s) IV Push every 8 hours PRN  morphine  - Injectable 3 milliGRAM(s) IV Push every 4 hours PRN  morphine  - Injectable 1.5 milliGRAM(s) IV Push every 4 hours PRN  pantoprazole  Injectable 40 milliGRAM(s) IV Push daily  saccharomyces boulardii 250 milliGRAM(s) Oral two times a day  simethicone 80 milliGRAM(s) Chew every 6 hours PRN      Objective:  Vital Signs Last 24 Hrs  T(C): 36.7 (24 Dec 2023 04:56), Max: 37.3 (23 Dec 2023 20:35)  T(F): 98.1 (24 Dec 2023 04:56), Max: 99.2 (23 Dec 2023 20:35)  HR: 85 (24 Dec 2023 04:56) (85 - 99)  BP: 101/68 (24 Dec 2023 04:56) (101/68 - 125/83)  BP(mean): --  RR: 18 (24 Dec 2023 04:56) (18 - 18)  SpO2: 93% (24 Dec 2023 04:56) (93% - 98%)    Parameters below as of 24 Dec 2023 04:56  Patient On (Oxygen Delivery Method): room air        T(C): 36.7 (12-24-23 @ 04:56), Max: 37.3 (12-22-23 @ 12:07)  T(C): 36.7 (12-24-23 @ 04:56), Max: 37.3 (12-22-23 @ 12:07)  T(C): 36.7 (12-24-23 @ 04:56), Max: 37.5 (12-20-23 @ 12:02)    PHYSICAL EXAM:  HEENT: NC atraumatic  Neck: supple  Respiratory: no accessory muscle use, breathing comfortably  Cardiovascular: distant  Gastrointestinal: normal appearing, nondistended  Extremities: no clubbing, no cyanosis,        LABS:                          13.6   7.97  )-----------( 286      ( 24 Dec 2023 05:15 )             38.1       WBC  7.97 12-24 @ 05:15  6.18 12-23 @ 08:19  6.54 12-22 @ 13:10  5.84 12-21 @ 07:10  6.00 12-20 @ 06:27  9.69 12-19 @ 12:35      12-24    141  |  106  |  7   ----------------------------<  119<H>  4.0   |  29  |  0.89    Ca    9.1      24 Dec 2023 05:15  Phos  4.5     12-24  Mg     2.1     12-24    TPro  6.9  /  Alb  3.6  /  TBili  0.7  /  DBili  x   /  AST  23  /  ALT  85<H>  /  AlkPhos  64  12-24      Creatinine: 0.89 mg/dL (12-24-23 @ 05:15)  Creatinine: 0.81 mg/dL (12-23-23 @ 08:19)  Creatinine: 0.83 mg/dL (12-22-23 @ 13:10)  Creatinine: 0.71 mg/dL (12-21-23 @ 07:10)  Creatinine: 0.89 mg/dL (12-20-23 @ 06:27)  Creatinine: 0.95 mg/dL (12-19-23 @ 12:35)        Urinalysis Basic - ( 24 Dec 2023 05:15 )    Color: x / Appearance: x / SG: x / pH: x  Gluc: 119 mg/dL / Ketone: x  / Bili: x / Urobili: x   Blood: x / Protein: x / Nitrite: x   Leuk Esterase: x / RBC: x / WBC x   Sq Epi: x / Non Sq Epi: x / Bacteria: x            INFLAMMATORY MARKERS      MICROBIOLOGY:              RADIOLOGY & ADDITIONAL STUDIES:

## 2023-12-24 NOTE — PROGRESS NOTE ADULT - SUBJECTIVE AND OBJECTIVE BOX
Physical Medicine and Rehabilitation Subsequent Evaluation    Patients CT scan notably improved today with improve colitis, will reduce morphine dosing.    ROS:  Constitutional: Denies fevers or chills  GI: + Abd pain    Medications: reviewed    Physical Exam:     Constitutional: Gen: In no acute distress, cooperative with exam and questioning   GI: Tender to palpation diffusely, no rebound or rigidity  Psychiatric: Awake alert fully oriented

## 2023-12-24 NOTE — PROGRESS NOTE ADULT - PROBLEM SELECTOR PROBLEM 2
IBS (irritable bowel syndrome)
IBS (irritable bowel syndrome)
Transaminitis
IBS (irritable bowel syndrome)
Transaminitis

## 2023-12-24 NOTE — PROGRESS NOTE ADULT - PROBLEM SELECTOR PLAN 1
Complaining of severe periumbilical pain   - repeat CT abd/pelvis - improved pancolitis.  - will monitor off abx as symptoms do not appear to be infectious in etiology.  - advanced to full liquid diet, will monitor closely for tolerance.   - PRN pain control: mild-tylenol. mod-morphine 1.5mg q6. sev-morphine 3mg q6. Will continue weaning.  - started bentyl prn for abdominal pain.   - will apply lidocaine patch to region  - start PO reglan 5mg qhs.  - continue ppi  advance to regular diet   add probiotic   - GI/ID eval noted

## 2023-12-24 NOTE — PROGRESS NOTE ADULT - TIME BILLING
direct patient care including but not limited to reviewing chart, medications ,laboratory data, imaging reports, discussion of plan of care with consultants on the case, coordination of care with multidisciplinary team involved in the case and discussion of plan with patient.  Patient  agreeable to plan of care and verbalized understanding the anticipated hospital course and treatment plan.
management as above  extensive counseling  pt expressed appreciation for time spent and for counseling/emotional support provided
direct patient care including but not limited to reviewing chart, medications ,laboratory data, imaging reports, discussion of plan of care with consultants on the case, coordination of care with multidisciplinary team involved in the case and discussion of plan with patient.  Patient and family father at bedside  agreeable to plan of care and verbalized understanding the anticipated hospital course and treatment plan.

## 2023-12-24 NOTE — PROGRESS NOTE ADULT - PROVIDER SPECIALTY LIST ADULT
Hospitalist
Infectious Disease
Internal Medicine
Gastroenterology
Physiatry
Gastroenterology
Infectious Disease
Hospitalist
Internal Medicine
Hospitalist

## 2023-12-25 ENCOUNTER — TRANSCRIPTION ENCOUNTER (OUTPATIENT)
Age: 38
End: 2023-12-25

## 2023-12-25 VITALS
TEMPERATURE: 99 F | HEART RATE: 90 BPM | OXYGEN SATURATION: 95 % | DIASTOLIC BLOOD PRESSURE: 76 MMHG | SYSTOLIC BLOOD PRESSURE: 101 MMHG | RESPIRATION RATE: 18 BRPM

## 2023-12-25 PROCEDURE — 87637 SARSCOV2&INF A&B&RSV AMP PRB: CPT

## 2023-12-25 PROCEDURE — 83036 HEMOGLOBIN GLYCOSYLATED A1C: CPT

## 2023-12-25 PROCEDURE — 87507 IADNA-DNA/RNA PROBE TQ 12-25: CPT

## 2023-12-25 PROCEDURE — 80053 COMPREHEN METABOLIC PANEL: CPT

## 2023-12-25 PROCEDURE — 96361 HYDRATE IV INFUSION ADD-ON: CPT

## 2023-12-25 PROCEDURE — 83735 ASSAY OF MAGNESIUM: CPT

## 2023-12-25 PROCEDURE — 99285 EMERGENCY DEPT VISIT HI MDM: CPT

## 2023-12-25 PROCEDURE — 81001 URINALYSIS AUTO W/SCOPE: CPT

## 2023-12-25 PROCEDURE — 83993 ASSAY FOR CALPROTECTIN FECAL: CPT

## 2023-12-25 PROCEDURE — 96374 THER/PROPH/DIAG INJ IV PUSH: CPT

## 2023-12-25 PROCEDURE — 83520 IMMUNOASSAY QUANT NOS NONAB: CPT

## 2023-12-25 PROCEDURE — 99239 HOSP IP/OBS DSCHRG MGMT >30: CPT

## 2023-12-25 PROCEDURE — 93005 ELECTROCARDIOGRAM TRACING: CPT

## 2023-12-25 PROCEDURE — 96375 TX/PRO/DX INJ NEW DRUG ADDON: CPT

## 2023-12-25 PROCEDURE — 86036 ANCA SCREEN EACH ANTIBODY: CPT

## 2023-12-25 PROCEDURE — 36415 COLL VENOUS BLD VENIPUNCTURE: CPT

## 2023-12-25 PROCEDURE — 85027 COMPLETE CBC AUTOMATED: CPT

## 2023-12-25 PROCEDURE — 0225U NFCT DS DNA&RNA 21 SARSCOV2: CPT

## 2023-12-25 PROCEDURE — 85652 RBC SED RATE AUTOMATED: CPT

## 2023-12-25 PROCEDURE — 80076 HEPATIC FUNCTION PANEL: CPT

## 2023-12-25 PROCEDURE — 74177 CT ABD & PELVIS W/CONTRAST: CPT

## 2023-12-25 PROCEDURE — 84702 CHORIONIC GONADOTROPIN TEST: CPT

## 2023-12-25 PROCEDURE — 84100 ASSAY OF PHOSPHORUS: CPT

## 2023-12-25 PROCEDURE — 86140 C-REACTIVE PROTEIN: CPT

## 2023-12-25 PROCEDURE — 85025 COMPLETE CBC W/AUTO DIFF WBC: CPT

## 2023-12-25 PROCEDURE — 80048 BASIC METABOLIC PNL TOTAL CA: CPT

## 2023-12-25 PROCEDURE — 82962 GLUCOSE BLOOD TEST: CPT

## 2023-12-25 PROCEDURE — 87086 URINE CULTURE/COLONY COUNT: CPT

## 2023-12-25 PROCEDURE — 83690 ASSAY OF LIPASE: CPT

## 2023-12-25 RX ORDER — BUPROPION HYDROCHLORIDE 150 MG/1
1 TABLET, EXTENDED RELEASE ORAL
Qty: 7 | Refills: 0
Start: 2023-12-25

## 2023-12-25 RX ORDER — ACETAMINOPHEN WITH CODEINE 300MG-30MG
1 TABLET ORAL
Qty: 21 | Refills: 0
Start: 2023-12-25 | End: 2023-12-31

## 2023-12-25 RX ORDER — CITALOPRAM 10 MG/1
2 TABLET, FILM COATED ORAL
Qty: 30 | Refills: 0
Start: 2023-12-25

## 2023-12-25 RX ORDER — METOCLOPRAMIDE HCL 10 MG
1 TABLET ORAL
Qty: 14 | Refills: 0
Start: 2023-12-25 | End: 2023-12-31

## 2023-12-25 RX ORDER — LAMOTRIGINE 25 MG/1
1 TABLET, ORALLY DISINTEGRATING ORAL
Qty: 0 | Refills: 0 | DISCHARGE
Start: 2023-12-25

## 2023-12-25 RX ORDER — TRAZODONE HCL 50 MG
25 TABLET ORAL ONCE
Refills: 0 | Status: COMPLETED | OUTPATIENT
Start: 2023-12-25 | End: 2023-12-25

## 2023-12-25 RX ADMIN — Medication 650 MILLIGRAM(S): at 10:24

## 2023-12-25 RX ADMIN — MORPHINE SULFATE 2 MILLIGRAM(S): 50 CAPSULE, EXTENDED RELEASE ORAL at 09:23

## 2023-12-25 RX ADMIN — Medication 100 MILLIGRAM(S): at 09:22

## 2023-12-25 RX ADMIN — Medication 650 MILLIGRAM(S): at 11:24

## 2023-12-25 RX ADMIN — Medication 25 MILLIGRAM(S): at 00:41

## 2023-12-25 RX ADMIN — LORATADINE 10 MILLIGRAM(S): 10 TABLET ORAL at 11:29

## 2023-12-25 RX ADMIN — Medication 250 MILLIGRAM(S): at 09:22

## 2023-12-25 RX ADMIN — MORPHINE SULFATE 2 MILLIGRAM(S): 50 CAPSULE, EXTENDED RELEASE ORAL at 09:51

## 2023-12-25 RX ADMIN — BUPROPION HYDROCHLORIDE 300 MILLIGRAM(S): 150 TABLET, EXTENDED RELEASE ORAL at 09:21

## 2023-12-25 RX ADMIN — Medication 100 MILLIGRAM(S): at 14:13

## 2023-12-25 RX ADMIN — LAMOTRIGINE 100 MILLIGRAM(S): 25 TABLET, ORALLY DISINTEGRATING ORAL at 09:21

## 2023-12-25 RX ADMIN — CITALOPRAM 80 MILLIGRAM(S): 10 TABLET, FILM COATED ORAL at 09:20

## 2023-12-25 RX ADMIN — PANTOPRAZOLE SODIUM 40 MILLIGRAM(S): 20 TABLET, DELAYED RELEASE ORAL at 11:47

## 2023-12-25 NOTE — DISCHARGE NOTE NURSING/CASE MANAGEMENT/SOCIAL WORK - NSDCPEFALRISK_GEN_ALL_CORE
For information on Fall & Injury Prevention, visit: [email or web address] OR  [email or web address] OR  https://www.cdc.gov/steadi/patient.html For information on Fall & Injury Prevention, visit: [email or web address] OR  [email or web address] OR  https://www.cdc.gov/steadi/patient.html

## 2023-12-25 NOTE — DISCHARGE NOTE PROVIDER - NSDCCPCAREPLAN_GEN_ALL_CORE_FT
PRINCIPAL DISCHARGE DIAGNOSIS  Diagnosis: Pancolitis  Assessment and Plan of Treatment: you were treated with antibiotic and other bowel regiment. repeat CT show improvement. follwo up GI as out patient as discussed. pain medication as needed as prescribed     PRINCIPAL DISCHARGE DIAGNOSIS  Diagnosis: Pancolitis  Assessment and Plan of Treatment: you were treated with antibiotic and other bowel regiment. repeat CT show improvement. follwo up GI as out patient as discussed. pain medication as needed as prescribed  Advised marijuana cessation for patient since it may be contributing to nausea   outpatient colonoscopy suggested

## 2023-12-25 NOTE — DISCHARGE NOTE PROVIDER - HOSPITAL COURSE
ADMISSION DATE:  12-19-23    ---  FROM ADMISSION H+P:   HPI:   Patient is a 38-year-old female with past medical history of IBS, diabetic, depression, anxiety, borderline personality disorder, current recurrent UTIs presents with abdominal pain.  Patient reports she has had vomiting and diarrhea for the past week.  Patient reports last night she developed periumbilical abdominal pain described as sharp stabbing.  Patient did not take anything for symptoms. pt endorse associated urinary frequency. Patient reports she has chronic abdominal issues.  Patient had CAT scans in the past without acute findings.  Patient denies fever, chest pain, shortness of breath, dysuria, hematuria   (19 Dec 2023 20:43)      ---  HOSPITAL COURSE/PERTINENT LABS/PROCEDURES PERFORMED/PENDING TESTS:   Pt was admitted for colitis and abd pain, CT show pancolitis, patient was briefly on antibiotic, then stable when off antibiotic, GI PCI negative. repeat CT show improved colitis.   advanced diet as tolerated.no recurrent nausea /vomiting. symptoms improved.       Patient is stable for discharge as per primary medical team and consultants.    Patient showed improvement throughout hospitalization. Patient was seen and examined on day of discharge. Patient was medically optimized for discharge with close outpatient follow up.      PATIENT CONDITION:  - stable    --  VITALS:   T(C): 37.1 (12-25-23 @ 11:33), Max: 37.1 (12-24-23 @ 14:36)  HR: 90 (12-25-23 @ 11:33) (85 - 91)  BP: 101/76 (12-25-23 @ 11:33) (92/61 - 113/76)  RR: 18 (12-25-23 @ 11:33) (18 - 18)  SpO2: 95% (12-25-23 @ 11:33) (91% - 97%)    PHYSICAL EXAM ON DAY OF DISCHARGE:  GENERAL: NAD, lying in bed comfortably  HEAD:  Normocephalic  EYES:  conjunctiva and sclera clear  ENT: Moist mucous membranes  NECK: Supple  CHEST/LUNG: Clear to auscultation bilaterally; No rales or rhonchi; no wheezing. Unlabored respirations  HEART: Regular rate and rhythm; S1S2+  ABDOMEN: Bowel sounds present; Soft, mild tender B/L LQ, no rebound, Nondistended.   EXTREMITIES:  + distal Peripheral Pulses;  No cyanosis, or edema  NERVOUS SYSTEM:  Alert & Oriented X3;  No gross focal deficits   MSK: moves all extremities  SKIN: No rashes     ---  CONSULTANTS:   ID   GI   pain management

## 2023-12-25 NOTE — DISCHARGE NOTE PROVIDER - PROVIDER TOKENS
PROVIDER:[TOKEN:[75:MIIS:75],FOLLOWUP:[2 weeks]],FREE:[LAST:[your primary care doctor],PHONE:[(   )    -],FAX:[(   )    -],FOLLOWUP:[1 week]]

## 2023-12-25 NOTE — DISCHARGE NOTE NURSING/CASE MANAGEMENT/SOCIAL WORK - PATIENT PORTAL LINK FT
You can access the FollowMyHealth Patient Portal offered by [facility] by registering at the following website: [email or web address]. By joining [employer]’s FollowMyHealth portal, you will also be able to view your health information using other applications (apps) compatible with our system. You can access the FollowMyHealth Patient Portal offered by [facility] by registering at the following website: [email or web address]. By joining [employer]’s FollowMyHealth portal, you will also be able to view your health information using other applications (apps) compatible with our system.

## 2023-12-25 NOTE — DISCHARGE NOTE PROVIDER - NSDCMRMEDTOKEN_GEN_ALL_CORE_FT
acetaminophen-codeine 300 mg-15 mg oral tablet: 1 tab(s) orally every 8 hours as needed for  pain MDD: 3 tablet  benzonatate 100 mg oral capsule: 1 cap(s) orally 3 times a day as needed for cough  lactobacillus acidophilus oral capsule: 1 dose(s) orally once a day  lamoTRIgine 100 mg oral tablet: 1 tab(s) orally 2 times a day  Reglan 10 mg oral tablet: 1 tab(s) orally 2 times a day  simethicone 80 mg oral tablet, chewable: 1 tab(s) chewed every 4 hours, As Needed for bloating, cramping, gas pain/discomfort

## 2023-12-25 NOTE — DISCHARGE NOTE PROVIDER - CARE PROVIDER_API CALL
Rudi Hayden  Gastroenterology  [address] [place] [address]  Phone: [phone]  Fax: [phone]  Follow Up Time: 2 weeks    your primary care doctor,   Phone: (   )    -  Fax: (   )    -  Follow Up Time: 1 week   Rudi Hayden  Gastroenterology  [address] [place] [address]  Phone: [phone]  Fax: [phone]  Follow Up Time: 2 weeks    your primary care doctor,   Phone: (   )    -  Fax: (   )    -  Follow Up Time: 1 week

## 2023-12-28 LAB
CALPROTECTIN STL-MCNT: 56 UG/G — SIGNIFICANT CHANGE UP (ref 0–120)
CALPROTECTIN STL-MCNT: 56 UG/G — SIGNIFICANT CHANGE UP (ref 0–120)

## 2023-12-30 ENCOUNTER — RESULT REVIEW (OUTPATIENT)
Age: 38
End: 2023-12-30

## 2024-03-14 NOTE — ED ADULT TRIAGE NOTE - HEIGHT IN CM
Beena Mesa, PharmD, BCCCP    [phone]

## 2024-03-28 ENCOUNTER — NON-APPOINTMENT (OUTPATIENT)
Age: 39
End: 2024-03-28

## 2024-04-08 ENCOUNTER — NON-APPOINTMENT (OUTPATIENT)
Age: 39
End: 2024-04-08

## 2024-07-24 NOTE — PATIENT CHOICE NOTE. - NSPOSTACUTEPROV_GEN_ALL_CORE
Patient states is an  and exposed to possible poison ivy/oak?   \"Itchy as hell\"  States on his ear, eyes, and left posterior wrist and abdomen    Other

## 2025-01-09 ENCOUNTER — TRANSCRIPTION ENCOUNTER (OUTPATIENT)
Age: 40
End: 2025-01-09

## 2025-07-16 ENCOUNTER — EMERGENCY (EMERGENCY)
Facility: HOSPITAL | Age: 40
LOS: 1 days | End: 2025-07-16
Attending: EMERGENCY MEDICINE | Admitting: EMERGENCY MEDICINE
Payer: MEDICAID

## 2025-07-16 VITALS
HEIGHT: 61 IN | SYSTOLIC BLOOD PRESSURE: 121 MMHG | TEMPERATURE: 99 F | WEIGHT: 169.98 LBS | OXYGEN SATURATION: 98 % | DIASTOLIC BLOOD PRESSURE: 88 MMHG | RESPIRATION RATE: 18 BRPM | HEART RATE: 88 BPM

## 2025-07-16 DIAGNOSIS — Z90.89 ACQUIRED ABSENCE OF OTHER ORGANS: Chronic | ICD-10-CM

## 2025-07-16 DIAGNOSIS — Z90.49 ACQUIRED ABSENCE OF OTHER SPECIFIED PARTS OF DIGESTIVE TRACT: Chronic | ICD-10-CM

## 2025-07-16 LAB
ALBUMIN SERPL ELPH-MCNC: 3.6 G/DL — SIGNIFICANT CHANGE UP (ref 3.3–5)
ALP SERPL-CCNC: 51 U/L — SIGNIFICANT CHANGE UP (ref 40–120)
ALT FLD-CCNC: 25 U/L — SIGNIFICANT CHANGE UP (ref 12–78)
ANION GAP SERPL CALC-SCNC: 5 MMOL/L — SIGNIFICANT CHANGE UP (ref 5–17)
APPEARANCE UR: CLEAR — SIGNIFICANT CHANGE UP
AST SERPL-CCNC: 7 U/L — LOW (ref 15–37)
BASOPHILS # BLD AUTO: 0.05 K/UL — SIGNIFICANT CHANGE UP (ref 0–0.2)
BASOPHILS NFR BLD AUTO: 0.5 % — SIGNIFICANT CHANGE UP (ref 0–2)
BILIRUB SERPL-MCNC: 0.3 MG/DL — SIGNIFICANT CHANGE UP (ref 0.2–1.2)
BILIRUB UR-MCNC: NEGATIVE — SIGNIFICANT CHANGE UP
BUN SERPL-MCNC: 15 MG/DL — SIGNIFICANT CHANGE UP (ref 7–23)
CALCIUM SERPL-MCNC: 9.1 MG/DL — SIGNIFICANT CHANGE UP (ref 8.5–10.1)
CHLORIDE SERPL-SCNC: 107 MMOL/L — SIGNIFICANT CHANGE UP (ref 96–108)
CO2 SERPL-SCNC: 27 MMOL/L — SIGNIFICANT CHANGE UP (ref 22–31)
COLOR SPEC: YELLOW — SIGNIFICANT CHANGE UP
CREAT SERPL-MCNC: 1.1 MG/DL — SIGNIFICANT CHANGE UP (ref 0.5–1.3)
DIFF PNL FLD: NEGATIVE — SIGNIFICANT CHANGE UP
EGFR: 65 ML/MIN/1.73M2 — SIGNIFICANT CHANGE UP
EGFR: 65 ML/MIN/1.73M2 — SIGNIFICANT CHANGE UP
EOSINOPHIL # BLD AUTO: 0.18 K/UL — SIGNIFICANT CHANGE UP (ref 0–0.5)
EOSINOPHIL NFR BLD AUTO: 1.8 % — SIGNIFICANT CHANGE UP (ref 0–6)
GLUCOSE SERPL-MCNC: 182 MG/DL — HIGH (ref 70–99)
GLUCOSE UR QL: NEGATIVE MG/DL — SIGNIFICANT CHANGE UP
HCG SERPL-ACNC: <1 MIU/ML — SIGNIFICANT CHANGE UP
HCT VFR BLD CALC: 37.8 % — SIGNIFICANT CHANGE UP (ref 34.5–45)
HGB BLD-MCNC: 13.5 G/DL — SIGNIFICANT CHANGE UP (ref 11.5–15.5)
IMM GRANULOCYTES # BLD AUTO: 0.04 K/UL — SIGNIFICANT CHANGE UP (ref 0–0.07)
IMM GRANULOCYTES NFR BLD AUTO: 0.4 % — SIGNIFICANT CHANGE UP (ref 0–0.9)
KETONES UR QL: NEGATIVE MG/DL — SIGNIFICANT CHANGE UP
LEUKOCYTE ESTERASE UR-ACNC: ABNORMAL
LIDOCAIN IGE QN: 31 U/L — SIGNIFICANT CHANGE UP (ref 13–75)
LYMPHOCYTES # BLD AUTO: 2.73 K/UL — SIGNIFICANT CHANGE UP (ref 1–3.3)
LYMPHOCYTES NFR BLD AUTO: 27.1 % — SIGNIFICANT CHANGE UP (ref 13–44)
MCHC RBC-ENTMCNC: 31.9 PG — SIGNIFICANT CHANGE UP (ref 27–34)
MCHC RBC-ENTMCNC: 35.7 G/DL — SIGNIFICANT CHANGE UP (ref 32–36)
MCV RBC AUTO: 89.4 FL — SIGNIFICANT CHANGE UP (ref 80–100)
MONOCYTES # BLD AUTO: 0.52 K/UL — SIGNIFICANT CHANGE UP (ref 0–0.9)
MONOCYTES NFR BLD AUTO: 5.2 % — SIGNIFICANT CHANGE UP (ref 2–14)
NEUTROPHILS # BLD AUTO: 6.57 K/UL — SIGNIFICANT CHANGE UP (ref 1.8–7.4)
NEUTROPHILS NFR BLD AUTO: 65 % — SIGNIFICANT CHANGE UP (ref 43–77)
NITRITE UR-MCNC: NEGATIVE — SIGNIFICANT CHANGE UP
NRBC # BLD AUTO: 0 K/UL — SIGNIFICANT CHANGE UP (ref 0–0)
NRBC # FLD: 0 K/UL — SIGNIFICANT CHANGE UP (ref 0–0)
NRBC BLD AUTO-RTO: 0 /100 WBCS — SIGNIFICANT CHANGE UP (ref 0–0)
PH UR: 5.5 — SIGNIFICANT CHANGE UP (ref 5–8)
PLATELET # BLD AUTO: 308 K/UL — SIGNIFICANT CHANGE UP (ref 150–400)
PMV BLD: 8.7 FL — SIGNIFICANT CHANGE UP (ref 7–13)
POTASSIUM SERPL-MCNC: 3.7 MMOL/L — SIGNIFICANT CHANGE UP (ref 3.5–5.3)
POTASSIUM SERPL-SCNC: 3.7 MMOL/L — SIGNIFICANT CHANGE UP (ref 3.5–5.3)
PROT SERPL-MCNC: 6.7 G/DL — SIGNIFICANT CHANGE UP (ref 6–8.3)
PROT UR-MCNC: NEGATIVE MG/DL — SIGNIFICANT CHANGE UP
RBC # BLD: 4.23 M/UL — SIGNIFICANT CHANGE UP (ref 3.8–5.2)
RBC # FLD: 11.9 % — SIGNIFICANT CHANGE UP (ref 10.3–14.5)
SODIUM SERPL-SCNC: 139 MMOL/L — SIGNIFICANT CHANGE UP (ref 135–145)
SP GR SPEC: 1.04 — HIGH (ref 1–1.03)
UROBILINOGEN FLD QL: 0.2 MG/DL — SIGNIFICANT CHANGE UP (ref 0.2–1)
WBC # BLD: 10.09 K/UL — SIGNIFICANT CHANGE UP (ref 3.8–10.5)
WBC # FLD AUTO: 10.09 K/UL — SIGNIFICANT CHANGE UP (ref 3.8–10.5)

## 2025-07-16 PROCEDURE — 80053 COMPREHEN METABOLIC PANEL: CPT

## 2025-07-16 PROCEDURE — 84702 CHORIONIC GONADOTROPIN TEST: CPT

## 2025-07-16 PROCEDURE — 36415 COLL VENOUS BLD VENIPUNCTURE: CPT

## 2025-07-16 PROCEDURE — 96375 TX/PRO/DX INJ NEW DRUG ADDON: CPT

## 2025-07-16 PROCEDURE — 74177 CT ABD & PELVIS W/CONTRAST: CPT

## 2025-07-16 PROCEDURE — 99285 EMERGENCY DEPT VISIT HI MDM: CPT

## 2025-07-16 PROCEDURE — 83690 ASSAY OF LIPASE: CPT

## 2025-07-16 PROCEDURE — 81001 URINALYSIS AUTO W/SCOPE: CPT

## 2025-07-16 PROCEDURE — 74177 CT ABD & PELVIS W/CONTRAST: CPT | Mod: 26

## 2025-07-16 PROCEDURE — 99284 EMERGENCY DEPT VISIT MOD MDM: CPT | Mod: 25

## 2025-07-16 PROCEDURE — 96374 THER/PROPH/DIAG INJ IV PUSH: CPT | Mod: XU

## 2025-07-16 PROCEDURE — 85025 COMPLETE CBC W/AUTO DIFF WBC: CPT

## 2025-07-16 RX ORDER — ONDANSETRON HCL/PF 4 MG/2 ML
4 VIAL (ML) INJECTION ONCE
Refills: 0 | Status: ACTIVE | OUTPATIENT
Start: 2025-07-16 | End: 2025-07-16

## 2025-07-16 RX ORDER — ONDANSETRON HCL/PF 4 MG/2 ML
4 VIAL (ML) INJECTION ONCE
Refills: 0 | Status: COMPLETED | OUTPATIENT
Start: 2025-07-16 | End: 2025-07-16

## 2025-07-16 RX ORDER — KETOROLAC TROMETHAMINE 30 MG/ML
30 INJECTION, SOLUTION INTRAMUSCULAR; INTRAVENOUS ONCE
Refills: 0 | Status: DISCONTINUED | OUTPATIENT
Start: 2025-07-16 | End: 2025-07-16

## 2025-07-16 RX ORDER — ACETAMINOPHEN 500 MG/5ML
1000 LIQUID (ML) ORAL ONCE
Refills: 0 | Status: COMPLETED | OUTPATIENT
Start: 2025-07-16 | End: 2025-07-16

## 2025-07-16 RX ADMIN — Medication 400 MILLIGRAM(S): at 11:58

## 2025-07-16 RX ADMIN — Medication 4 MILLIGRAM(S): at 13:03

## 2025-07-16 RX ADMIN — KETOROLAC TROMETHAMINE 30 MILLIGRAM(S): 30 INJECTION, SOLUTION INTRAMUSCULAR; INTRAVENOUS at 13:34

## 2025-07-16 RX ADMIN — Medication 20 MILLIGRAM(S): at 13:37

## 2025-07-16 RX ADMIN — Medication 1000 MILLILITER(S): at 11:58

## 2025-07-16 RX ADMIN — Medication 4 MILLIGRAM(S): at 11:57

## 2025-07-16 NOTE — ED PROVIDER NOTE - CARE PLAN
Subjective:       Patient ID: Judie Munoz is a 46 y.o. female.    Chief Complaint: Urinary Tract Infection    The patient location is: home  The chief complaint leading to consultation is: UTI    Visit type: audiovisual    Face to Face time with patient: 4 minutes (chart review started 12:29 pm; video started 12:31 pm; video ended 12:35 pm)  8 minutes of total time spent on the encounter, which includes face to face time and non-face to face time preparing to see the patient (eg, review of tests), Obtaining and/or reviewing separately obtained history, Documenting clinical information in the electronic or other health record, Independently interpreting results (not separately reported) and communicating results to the patient/family/caregiver, or Care coordination (not separately reported).     Each patient to whom he or she provides medical services by telemedicine is:  (1) informed of the relationship between the physician and patient and the respective role of any other health care provider with respect to management of the patient; and (2) notified that he or she may decline to receive medical services by telemedicine and may withdraw from such care at any time.    History of Present Illness    PCP: Dr. Tong    Patient reports symptoms of a UTI that began yesterday and have worsened today. She describes difficulty urinating, with very little urine output and significant pain during urination. She frequently travels for work between [place] and [place], [place], approximately 3-4 times per week. She acknowledges not drinking enough fluids and holding urine during these trips to avoid stopping, recognizing these behaviors as potential contributors to the UTI. She has not observed any blood in the urine. She reports having night sweats normally but has not noticed any other symptoms different from usual.  - She denies fever, chills, or severe back pain.        Review of Systems   Constitutional:   Negative for chills.   Gastrointestinal:  Positive for constipation. Negative for nausea and vomiting.   Genitourinary:  Positive for dysuria, frequency and urgency. Negative for flank pain and hematuria.   Skin:  Negative for rash.         Objective:      Physical Exam  Constitutional:       General: She is not in acute distress.     Appearance: She is well-developed.   HENT:      Head: Normocephalic and atraumatic.   Eyes:      General: Lids are normal. No scleral icterus.     Extraocular Movements: Extraocular movements intact.      Conjunctiva/sclera: Conjunctivae normal.   Pulmonary:      Effort: Pulmonary effort is normal.   Neurological:      Mental Status: She is alert and oriented to person, place, and time.   Psychiatric:         Mood and Affect: Mood and affect normal.           Assessment:       1. Acute cystitis without hematuria        Plan:   1. Acute cystitis without hematuria  -     nitrofurantoin, macrocrystal-monohydrate, (MACROBID) 100 MG capsule; Take 1 capsule (100 mg total) by mouth 2 (two) times daily. for 5 days  Dispense: 10 capsule; Refill: 0  -     phenazopyridine (PYRIDIUM) 100 MG tablet; Take 1 tablet (100 mg total) by mouth 3 (three) times daily as needed for Pain.  Dispense: 10 tablet; Refill: 0       Prescribed Macrobid, to be taken twice daily for 5 days.   Prescribed Phenazopyridine (Pyridium) for symptomatic relief of urinary discomfort.   Educated the patient that Phenazopyridine will cause orange discoloration of urine.   Advised increased fluid intake and avoiding urinary retention.   Instructed the patient to follow up if symptoms worsen or do not improve with treatment.    Patient expressed understanding and agreement with plan.    Follow up if symptoms worsen or fail to improve, for keep routine follow up with PCP.    This note was generated with the assistance of ambient listening technology. Verbal consent was obtained by the patient and accompanying visitor(s) for the  recording of patient appointment to facilitate this note. I attest to having reviewed and edited the generated note for accuracy, though some syntax or spelling errors may persist. Please contact the author of this note for any clarification.       1 Principal Discharge DX:	Abdominal pain

## 2025-07-16 NOTE — ED PROVIDER NOTE - PROGRESS NOTE DETAILS
pt advised on all abnormal ct findings including hepatomegaly advised pt needs gi follow up and pepcid otc. offered to make pt pt advised on all abnormal ct findings including hepatomegaly advised pt needs gi follow up and pepcid otc. offered to make pt apt she refused advised prompt follow up All imaging and labs reviewed. all results reviewed with pt including abnormal results. pt given a copy of results. pt advised to follow up with pmd regarding abnormal results. All questions answered and concerns addressed. pt verbalized understanding and agreement with plan and dx. pt advised on next step and when/where to follow up. pt advised on all take home and otc medications. pt advised to follow up with PMD. pt advised to return to ed for worsenng symptoms including fever, cp, sob. will dc.

## 2025-07-16 NOTE — ED PROVIDER NOTE - CARE PROVIDER_API CALL
Rudi Hayden ()  Gastroenterology  [address] [place] [address]  Phone: [phone]  Fax: [phone]  Follow Up Time: 1-3 Days

## 2025-07-16 NOTE — CONSULT NOTE ADULT - SUBJECTIVE AND OBJECTIVE BOX
[facility]    Rudi Jackelyn Monae NP    [address] [place] [address] [phone]      Chief Complaint:  Patient is a 40y old  Female who presents with a chief complaint of     HPI:Patient is a 40-year-old female past medical history of prediabetic on metformin, pancreatitis status post Mounjaro, IBS presents with abdominal pain.  Patient reports left upper abdominal pain with questionable mass.  Patient saw PCP who did ultrasound without acute findings and was concern for adhesions.  Patient noted she had dark stool yesterday and now has become more light today.  Patient never had symptoms in the past.  Patient with history of hernia surgery.  Patient denies fever chest pain shortness of breath vomiting diarrhea dysuria    Allergies:  cats (Unknown)  doxycycline (Stomach Upset; Nausea)      Medications:  ondansetron Injectable 4 milliGRAM(s) IV Push once      PMHX/PSHX:  Renal colic    Borderline personality disorder    Anxiety    Depression    Recurrent UTI    IBS (irritable bowel syndrome)    Status post cholecystectomy    S/P tonsillectomy        Family history:  Family history of gallbladder disease (Father, Mother)        Social History:     ROS:     General:  No wt loss, fevers, chills, night sweats, fatigue,   Eyes:  Good vision, no reported pain  ENT:  No sore throat, pain, runny nose, dysphagia  CV:  No pain, palpitations, hypo/hypertension  Resp:  No dyspnea, cough, tachypnea, wheezing  GI:  + pain, No nausea, No vomiting, No diarrhea, No constipation, No weight loss, No fever, No pruritis, No rectal bleeding, No tarry stools, No dysphagia,  :  No pain, bleeding, incontinence, nocturia  Muscle:  No pain, weakness  Neuro:  No weakness, tingling, memory problems  Psych:  No fatigue, insomnia, mood problems, depression  Endocrine:  No polyuria, polydipsia, cold/heat intolerance  Heme:  No petechiae, ecchymosis, easy bruisability  Skin:  No rash, tattoos, scars, edema      PHYSICAL EXAM:   Vital Signs:  Vital Signs Last 24 Hrs  T(C): 37.1 (2025 11:08), Max: 37.1 (2025 11:08)  T(F): 98.8 (2025 11:08), Max: 98.8 (2025 11:08)  HR: 88 (2025 11:08) (88 - 88)  BP: 121/88 (2025 11:08) (121/88 - 121/88)  BP(mean): --  RR: 18 (2025 11:08) (18 - 18)  SpO2: 98% (2025 11:08) (98% - 98%)    Parameters below as of 2025 11:08  Patient On (Oxygen Delivery Method): room air      Daily Height in cm: 154.94 (2025 11:08)    Daily     GENERAL:  Appears stated age, well-groomed, well-nourished, no distress  HEENT:  NC/AT,  conjunctivae clear and pink, no thyromegaly, nodules, adenopathy, no JVD, sclera -anicteric  CHEST:  Full & symmetric excursion, no increased effort, breath sounds clear  HEART:  Regular rhythm, S1, S2, no murmur/rub/S3/S4, no abdominal bruit, no edema  ABDOMEN:  Soft, non-tender, non-distended, normoactive bowel sounds,  no masses ,no hepato-splenomegaly, no signs of chronic liver disease  EXTEREMITIES:  no cyanosis,clubbing or edema  SKIN:  No rash/erythema/ecchymoses/petechiae/wounds/abscess/warm/dry  NEURO:  Alert, oriented, no asterixis, no tremor, no encephalopathy    LABS:                        13.5   10.09 )-----------( 308      ( 2025 12:05 )             37.8     07-16    139  |  107  |  15  ----------------------------<  182[H]  3.7   |  27  |  1.10    Ca    9.1      2025 12:05    TPro  6.7  /  Alb  3.6  /  TBili  0.3  /  DBili  x   /  AST  7[L]  /  ALT  25  /  AlkPhos  51  07-16    LIVER FUNCTIONS - ( 2025 12:05 )  Alb: 3.6 g/dL / Pro: 6.7 g/dL / ALK PHOS: 51 U/L / ALT: 25 U/L / AST: 7 U/L / GGT: x             Urinalysis Basic - ( 2025 13:04 )    Color: Yellow / Appearance: Clear / S.039 / pH: x  Gluc: x / Ketone: x  / Bili: Negative / Urobili: 0.2 mg/dL   Blood: x / Protein: Negative mg/dL / Nitrite: Negative   Leuk Esterase: Trace / RBC: 0 /HPF / WBC 2 /HPF   Sq Epi: x / Non Sq Epi: x / Bacteria: x      Amylase Serum--      Lipase serum31       Ammonia--      Imaging:

## 2025-07-16 NOTE — CONSULT NOTE ADULT - ASSESSMENT
abdominal pain    labs and CT wnl  images reviewed  ? dilated stomach  needs outpatient workup for gastroparesis  no need to admit

## 2025-07-16 NOTE — ED PROVIDER NOTE - ABDOMINAL EXAM
possible palpable tener lymph nodes luq?/soft/nontender.../tender.../nondistended/no organomegaly/no pulsating masses

## 2025-07-16 NOTE — ED ADULT TRIAGE NOTE - CHIEF COMPLAINT QUOTE
abdominal pain for a few months, worsening over the past few days. had outpatient US done-negative but states can feel lump. n/d, denies vomiting.

## 2025-07-16 NOTE — ED PROVIDER NOTE - PATIENT PORTAL LINK FT
You can access the FollowMyHealth Patient Portal offered by [facility] by registering at the following website: [email or web address]. By joining [facility]’s FollowMyHealth portal, you will also be able to view your health information using other applications (apps) compatible with our system.

## 2025-07-16 NOTE — ED ADULT NURSE NOTE - OBJECTIVE STATEMENT
A/ox4 patient came to ed c/o left upper quad abdominal pain, worsening today. Patient has hx of pancreatitis 2/2 monjourno use. Patient afebrile, states she feels there is mass to luq. Pending further labs and radiology reports. Will cont to monitor

## 2025-07-16 NOTE — ED PROVIDER NOTE - DIFFERENTIAL DIAGNOSIS
Differential Diagnosis Patient is presenting to the emergency room with a chief complaint of left upper quadrant pain.  Differential includes but not limited to gastritis versus pancreatitis versus additional intra-abdominal pathology.  Obtain screening labs CT imaging hydrate medicate for symptoms and monitor.  Ultimate clinical disposition will be pending results

## 2025-07-16 NOTE — ED PROVIDER NOTE - NSFOLLOWUPINSTRUCTIONS_ED_ALL_ED_FT
Abdominal Pain    Many things can cause abdominal pain. Many times, abdominal pain is not caused by a disease and will improve without treatment. Your health care provider will do a physical exam to determine if there is a dangerous cause of your pain; blood tests and imaging may help determine the cause of your pain. However, in many cases, no cause may be found and you may need further testing as an outpatient. Monitor your abdominal pain for any changes.     SEEK IMMEDIATE MEDICAL CARE IF YOU HAVE ANY OF THE FOLLOWING SYMPTOMS: worsening abdominal pain, uncontrollable vomiting, profuse diarrhea, inability to have bowel movements or pass gas, black or bloody stools, fever accompanying chest pain or back pain, or fainting. These symptoms may represent a serious problem that is an emergency. Do not wait to see if the symptoms will go away. Get medical help right away. Call 911 and do not drive yourself to the hospital.  1. FOLLOW UP WITH YOUR PRIMARY DOCTOR IN 24-48 HOURS.   2. FOLLOW UP WITH ALL SPECIALIST DISCUSSED DURING YOUR VISIT.   3. TAKE ALL MEDICATIONS PRESCRIBED IN THE ER IF ANY ARE PRESCRIBED. CONTINUE YOUR HOME MEDICATIONS UNLESS OTHERWISE ADVISED DIFFERENTLY.   4. RETURN FOR WORSENING SYMPTOMS OR CONCERNS INCLUDING BUT NOT LIMITED TO FEVER, CHEST PAIN, OR TROUBLE BREATHING OR ANY OTHER CONCERNS  over the counter pepcid once daily  can add maalox or tums

## 2025-07-16 NOTE — ED ADULT TRIAGE NOTE - WEIGHT IN KG
Airway  Performed by: Barrie Ospina  Authorized by: Grabiel Castro MD     Final Airway Type:  Supraglottic airway  Final Supraglottic Airway:  Air-Q  SGA Size*:  4.5  Attempts*:  1  Number of Other Approaches Attempted:  0   Patient Identified, Procedure confirmed, Emergency equipment available and Safety protocols followed  Location:  OR  Urgency:  Elective  Difficult Airway: No    Indications for Airway Management:  Anesthesia  Spontaneous Ventilation: absent    Sedation Level:  Anesthetized  Mask Difficulty Assessment:  0 - not attempted  Performed By:  Anesthesiologist and Resident  Anesthesiologist:  Grabiel Castro MD  Resident:  Barrie Ospina     77.1

## 2025-07-16 NOTE — ED PROVIDER NOTE - CLINICAL SUMMARY MEDICAL DECISION MAKING FREE TEXT BOX
Patient is a 40-year-old female presents to the emergency room with left upper abdominal pain.  Past medical history of prediabetes pancreatitis after Mounjaro no longer on IBS anxiety borderline personality disorder depression recurrent UTIs renal colic.  Patient reports that she has been experiencing left upper abdominal pain for months worsening over the last several days.  Has been following with her primary care physician who performed an ultrasound and was attempting to get her followed up with surgery.  Yesterday she noted the pain worsened and she had dark stools today she had more light-colored stools.  No prior symptoms in the past.  Did have some nausea no vomiting.  Denies any fever chest pain or shortness of breath.  Feels like there may be a lump in the area where she is having the pain.  Denies any dysuria frequency or urgency.  On exam patient is laying in bed no acute distress normocephalic atraumatic pupils are equal round and reactive heart is regular rate lungs clear to auscultation abdomen soft tenderness to palpation in the left upper quadrant with no appreciable mass noted positive bowel sounds noted.  Neuroexam is nonfocal.  Slight redness of the lower abdomen where she had a heating pad no appreciable burn no vesicular lesions.  No tenderness to palpation to the anterior chest wall.  Patient is presenting to the emergency room with a chief complaint of left upper quadrant pain.  Differential includes but not limited to gastritis versus pancreatitis versus additional intra-abdominal pathology.  Obtain screening labs CT imaging hydrate medicate for symptoms and monitor.  Ultimate clinical disposition will be pending results.  Independent review of CT imaging reveals no acute intra-abdominal pathology.  Mildly enlarged liver status postcholecystectomy uterus adnexa within normal. Patient is a 40-year-old female presents to the emergency room with left upper abdominal pain.  Past medical history of prediabetes pancreatitis after Mounjaro no longer on IBS anxiety borderline personality disorder depression recurrent UTIs renal colic.  Patient reports that she has been experiencing left upper abdominal pain for months worsening over the last several days.  Has been following with her primary care physician who performed an ultrasound and was attempting to get her followed up with surgery.  Yesterday she noted the pain worsened and she had dark stools today she had more light-colored stools.  No prior symptoms in the past.  Did have some nausea no vomiting.  Denies any fever chest pain or shortness of breath.  Feels like there may be a lump in the area where she is having the pain.  Denies any dysuria frequency or urgency.  On exam patient is laying in bed no acute distress normocephalic atraumatic pupils are equal round and reactive heart is regular rate lungs clear to auscultation abdomen soft tenderness to palpation in the left upper quadrant with no appreciable mass noted positive bowel sounds noted.  Neuroexam is nonfocal.  Slight redness of the lower abdomen where she had a heating pad no appreciable burn no vesicular lesions.  No tenderness to palpation to the anterior chest wall.  Patient is presenting to the emergency room with a chief complaint of left upper quadrant pain.  Differential includes but not limited to gastritis versus pancreatitis versus additional intra-abdominal pathology.  Obtain screening labs CT imaging hydrate medicate for symptoms and monitor.  Ultimate clinical disposition will be pending results.  Independent review of CT imaging reveals no acute intra-abdominal pathology.  Mildly enlarged liver status postcholecystectomy uterus adnexa within normal. Seen by GI questionable gastroparesis no acute in-hospital intervention stable for discharge with outpatient follow-up in the office.

## 2025-07-16 NOTE — ED PROVIDER NOTE - OBJECTIVE STATEMENT
Patient is a 40-year-old female past medical history of prediabetic on metformin, pancreatitis status post Mounjaro, IBS presents with abdominal pain.  Patient reports left upper abdominal pain with questionable mass.  Patient saw PCP who did ultrasound without acute findings and was concern for adhesions.  Patient noted she had dark stool yesterday and now has become more light today.  Patient never had symptoms in the past.  Patient with history of hernia surgery.  Patient denies fever chest pain shortness of breath vomiting diarrhea dysuria

## 2025-07-16 NOTE — ED PROVIDER NOTE - CARE PROVIDERS DIRECT ADDRESSES
Group Therapy Checklist  Attendance: Attended  Attendance Duration (min): 46-60  Number of Participants: 7  Program/Group: Partial Hospital Program  Topics Covered: Mindfulness  Participation: Active verbal participation, Attentive  Affect/Mood Range: Normal range, Flexible  Affect/Mood Display: Congruent w/content  Evidence of Imminent Suicide Risk: No  Evidence of imminent homicide risk: No  Therapeutic Interventions: Distress tolerance skills, Interpersonal effectiveness skills  Progress Toward Treatment Goal: Moderate improvement     ,DirectAddress_Unknown